# Patient Record
Sex: MALE | Race: WHITE | NOT HISPANIC OR LATINO | Employment: OTHER | URBAN - METROPOLITAN AREA
[De-identification: names, ages, dates, MRNs, and addresses within clinical notes are randomized per-mention and may not be internally consistent; named-entity substitution may affect disease eponyms.]

---

## 2017-01-30 ENCOUNTER — AMBULATORY - HEALTHEAST (OUTPATIENT)
Dept: CARDIOLOGY | Facility: CLINIC | Age: 67
End: 2017-01-30

## 2017-02-14 ENCOUNTER — OFFICE VISIT - HEALTHEAST (OUTPATIENT)
Dept: CARDIOLOGY | Facility: CLINIC | Age: 67
End: 2017-02-14

## 2017-02-14 DIAGNOSIS — E78.2 MIXED HYPERLIPIDEMIA: ICD-10-CM

## 2017-02-14 DIAGNOSIS — I25.10 CORONARY ARTERY DISEASE INVOLVING NATIVE CORONARY ARTERY OF NATIVE HEART WITHOUT ANGINA PECTORIS: ICD-10-CM

## 2017-02-14 DIAGNOSIS — I10 ESSENTIAL HYPERTENSION WITH GOAL BLOOD PRESSURE LESS THAN 140/90: ICD-10-CM

## 2017-02-14 DIAGNOSIS — I20.9 ISCHEMIC CHEST PAIN (H): ICD-10-CM

## 2017-02-14 DIAGNOSIS — F17.200 TOBACCO DEPENDENCE: ICD-10-CM

## 2017-02-14 ASSESSMENT — MIFFLIN-ST. JEOR: SCORE: 1563.64

## 2017-06-02 ENCOUNTER — RECORDS - HEALTHEAST (OUTPATIENT)
Dept: LAB | Facility: CLINIC | Age: 67
End: 2017-06-02

## 2017-06-02 LAB
CHOLEST SERPL-MCNC: 137 MG/DL
FASTING STATUS PATIENT QL REPORTED: NO
HDLC SERPL-MCNC: 45 MG/DL
LDLC SERPL CALC-MCNC: 81 MG/DL
TRIGL SERPL-MCNC: 56 MG/DL

## 2017-12-18 ENCOUNTER — COMMUNICATION - HEALTHEAST (OUTPATIENT)
Dept: ADMINISTRATIVE | Facility: CLINIC | Age: 67
End: 2017-12-18

## 2018-01-02 ENCOUNTER — AMBULATORY - HEALTHEAST (OUTPATIENT)
Dept: CARDIOLOGY | Facility: CLINIC | Age: 68
End: 2018-01-02

## 2018-01-02 ENCOUNTER — RECORDS - HEALTHEAST (OUTPATIENT)
Dept: ADMINISTRATIVE | Facility: OTHER | Age: 68
End: 2018-01-02

## 2018-01-08 ENCOUNTER — OFFICE VISIT - HEALTHEAST (OUTPATIENT)
Dept: CARDIOLOGY | Facility: CLINIC | Age: 68
End: 2018-01-08

## 2018-01-08 DIAGNOSIS — E78.2 MIXED HYPERLIPIDEMIA: ICD-10-CM

## 2018-01-08 DIAGNOSIS — I10 ESSENTIAL HYPERTENSION WITH GOAL BLOOD PRESSURE LESS THAN 140/90: ICD-10-CM

## 2018-01-08 DIAGNOSIS — I25.10 CORONARY ARTERY DISEASE INVOLVING NATIVE CORONARY ARTERY OF NATIVE HEART WITHOUT ANGINA PECTORIS: ICD-10-CM

## 2018-01-08 ASSESSMENT — MIFFLIN-ST. JEOR: SCORE: 1486.08

## 2018-07-02 ENCOUNTER — AMBULATORY - HEALTHEAST (OUTPATIENT)
Dept: NEUROLOGY | Facility: CLINIC | Age: 68
End: 2018-07-02

## 2018-07-02 DIAGNOSIS — R41.3 MEMORY CHANGES: ICD-10-CM

## 2018-07-24 ENCOUNTER — HOSPITAL ENCOUNTER (OUTPATIENT)
Dept: NEUROLOGY | Facility: CLINIC | Age: 68
Setting detail: THERAPIES SERIES
Discharge: STILL ADMITTED OR ADMINISTRATIVE | End: 2018-07-24
Attending: FAMILY MEDICINE

## 2018-07-24 DIAGNOSIS — R41.3 MEMORY CHANGES: ICD-10-CM

## 2018-07-24 DIAGNOSIS — G31.84 MILD NEUROCOGNITIVE DISORDER: ICD-10-CM

## 2018-09-10 ENCOUNTER — RECORDS - HEALTHEAST (OUTPATIENT)
Dept: LAB | Facility: CLINIC | Age: 68
End: 2018-09-10

## 2018-09-10 LAB
ANION GAP SERPL CALCULATED.3IONS-SCNC: 10 MMOL/L (ref 5–18)
BUN SERPL-MCNC: 14 MG/DL (ref 8–28)
CALCIUM SERPL-MCNC: 9.6 MG/DL (ref 8.5–10.5)
CHLORIDE BLD-SCNC: 104 MMOL/L (ref 98–107)
CHOLEST SERPL-MCNC: 138 MG/DL
CO2 SERPL-SCNC: 26 MMOL/L (ref 22–31)
CREAT SERPL-MCNC: 0.84 MG/DL (ref 0.7–1.3)
FASTING STATUS PATIENT QL REPORTED: YES
FOLATE SERPL-MCNC: 10.3 NG/ML
GFR SERPL CREATININE-BSD FRML MDRD: >60 ML/MIN/1.73M2
GLUCOSE BLD-MCNC: 100 MG/DL (ref 70–125)
HDLC SERPL-MCNC: 60 MG/DL
LDLC SERPL CALC-MCNC: 69 MG/DL
POTASSIUM BLD-SCNC: 3.8 MMOL/L (ref 3.5–5)
PSA SERPL-MCNC: 2.5 NG/ML (ref 0–6.5)
SODIUM SERPL-SCNC: 140 MMOL/L (ref 136–145)
TRIGL SERPL-MCNC: 45 MG/DL
TSH SERPL DL<=0.005 MIU/L-ACNC: 2.29 UIU/ML (ref 0.3–5)

## 2018-10-01 ENCOUNTER — HOSPITAL ENCOUNTER (OUTPATIENT)
Dept: NEUROLOGY | Facility: CLINIC | Age: 68
Setting detail: THERAPIES SERIES
Discharge: STILL ADMITTED OR ADMINISTRATIVE | End: 2018-10-01
Attending: FAMILY MEDICINE

## 2018-10-01 DIAGNOSIS — R41.3 MEMORY CHANGES: ICD-10-CM

## 2018-11-01 ENCOUNTER — HOSPITAL ENCOUNTER (OUTPATIENT)
Dept: NEUROLOGY | Facility: CLINIC | Age: 68
Setting detail: THERAPIES SERIES
Discharge: STILL ADMITTED OR ADMINISTRATIVE | End: 2018-11-01
Attending: FAMILY MEDICINE

## 2019-02-26 ENCOUNTER — RECORDS - HEALTHEAST (OUTPATIENT)
Dept: ADMINISTRATIVE | Facility: OTHER | Age: 69
End: 2019-02-26

## 2019-02-26 ENCOUNTER — AMBULATORY - HEALTHEAST (OUTPATIENT)
Dept: CARDIOLOGY | Facility: CLINIC | Age: 69
End: 2019-02-26

## 2019-02-27 ENCOUNTER — OFFICE VISIT - HEALTHEAST (OUTPATIENT)
Dept: CARDIOLOGY | Facility: CLINIC | Age: 69
End: 2019-02-27

## 2019-02-27 DIAGNOSIS — I25.10 CORONARY ARTERY DISEASE INVOLVING NATIVE CORONARY ARTERY OF NATIVE HEART WITHOUT ANGINA PECTORIS: ICD-10-CM

## 2019-02-27 DIAGNOSIS — F17.200 TOBACCO DEPENDENCE: ICD-10-CM

## 2019-02-27 DIAGNOSIS — I10 ESSENTIAL HYPERTENSION: ICD-10-CM

## 2019-02-27 DIAGNOSIS — E78.2 MIXED HYPERLIPIDEMIA: ICD-10-CM

## 2019-02-27 DIAGNOSIS — Z72.0 TOBACCO ABUSE: ICD-10-CM

## 2019-02-27 DIAGNOSIS — R07.89 CHEST WALL PAIN: ICD-10-CM

## 2019-02-27 ASSESSMENT — MIFFLIN-ST. JEOR: SCORE: 1545.05

## 2019-03-06 ENCOUNTER — COMMUNICATION - HEALTHEAST (OUTPATIENT)
Dept: CARDIOLOGY | Facility: CLINIC | Age: 69
End: 2019-03-06

## 2019-03-06 DIAGNOSIS — E78.2 MIXED HYPERLIPIDEMIA: ICD-10-CM

## 2019-03-06 DIAGNOSIS — I25.10 CORONARY ARTERY DISEASE INVOLVING NATIVE CORONARY ARTERY OF NATIVE HEART WITHOUT ANGINA PECTORIS: ICD-10-CM

## 2019-03-06 DIAGNOSIS — I10 ESSENTIAL HYPERTENSION WITH GOAL BLOOD PRESSURE LESS THAN 140/90: ICD-10-CM

## 2020-03-03 ENCOUNTER — RECORDS - HEALTHEAST (OUTPATIENT)
Dept: LAB | Facility: CLINIC | Age: 70
End: 2020-03-03

## 2020-03-03 LAB
ANION GAP SERPL CALCULATED.3IONS-SCNC: 12 MMOL/L (ref 5–18)
BUN SERPL-MCNC: 17 MG/DL (ref 8–28)
CALCIUM SERPL-MCNC: 10 MG/DL (ref 8.5–10.5)
CHLORIDE BLD-SCNC: 98 MMOL/L (ref 98–107)
CHOLEST SERPL-MCNC: 148 MG/DL
CO2 SERPL-SCNC: 25 MMOL/L (ref 22–31)
CREAT SERPL-MCNC: 0.84 MG/DL (ref 0.7–1.3)
FASTING STATUS PATIENT QL REPORTED: NORMAL
GFR SERPL CREATININE-BSD FRML MDRD: >60 ML/MIN/1.73M2
GLUCOSE BLD-MCNC: 97 MG/DL (ref 70–125)
HDLC SERPL-MCNC: 54 MG/DL
LDLC SERPL CALC-MCNC: 81 MG/DL
POTASSIUM BLD-SCNC: 3.6 MMOL/L (ref 3.5–5)
SODIUM SERPL-SCNC: 135 MMOL/L (ref 136–145)
TRIGL SERPL-MCNC: 64 MG/DL

## 2020-04-22 ENCOUNTER — COMMUNICATION - HEALTHEAST (OUTPATIENT)
Dept: CARDIOLOGY | Facility: CLINIC | Age: 70
End: 2020-04-22

## 2020-04-22 DIAGNOSIS — I25.10 CORONARY ARTERY DISEASE INVOLVING NATIVE CORONARY ARTERY OF NATIVE HEART WITHOUT ANGINA PECTORIS: ICD-10-CM

## 2020-04-22 DIAGNOSIS — I10 ESSENTIAL HYPERTENSION WITH GOAL BLOOD PRESSURE LESS THAN 140/90: ICD-10-CM

## 2020-04-27 ENCOUNTER — RECORDS - HEALTHEAST (OUTPATIENT)
Dept: ADMINISTRATIVE | Facility: OTHER | Age: 70
End: 2020-04-27

## 2020-04-27 ENCOUNTER — AMBULATORY - HEALTHEAST (OUTPATIENT)
Dept: CARDIOLOGY | Facility: CLINIC | Age: 70
End: 2020-04-27

## 2020-04-28 ENCOUNTER — OFFICE VISIT - HEALTHEAST (OUTPATIENT)
Dept: CARDIOLOGY | Facility: CLINIC | Age: 70
End: 2020-04-28

## 2020-04-28 DIAGNOSIS — I25.10 CORONARY ARTERY DISEASE INVOLVING NATIVE CORONARY ARTERY OF NATIVE HEART WITHOUT ANGINA PECTORIS: ICD-10-CM

## 2020-04-28 DIAGNOSIS — J30.2 SEASONAL ALLERGIC RHINITIS, UNSPECIFIED TRIGGER: ICD-10-CM

## 2020-04-28 DIAGNOSIS — E78.2 MIXED HYPERLIPIDEMIA: ICD-10-CM

## 2020-04-28 DIAGNOSIS — I10 ESSENTIAL HYPERTENSION: ICD-10-CM

## 2020-04-28 DIAGNOSIS — F17.200 TOBACCO DEPENDENCE: ICD-10-CM

## 2020-04-28 DIAGNOSIS — I10 ESSENTIAL HYPERTENSION WITH GOAL BLOOD PRESSURE LESS THAN 140/90: ICD-10-CM

## 2020-11-23 ENCOUNTER — RECORDS - HEALTHEAST (OUTPATIENT)
Dept: LAB | Facility: CLINIC | Age: 70
End: 2020-11-23

## 2020-11-23 LAB
ALBUMIN SERPL-MCNC: 3.9 G/DL (ref 3.5–5)
ALP SERPL-CCNC: 92 U/L (ref 45–120)
ALT SERPL W P-5'-P-CCNC: 18 U/L (ref 0–45)
ANION GAP SERPL CALCULATED.3IONS-SCNC: 15 MMOL/L (ref 5–18)
AST SERPL W P-5'-P-CCNC: 19 U/L (ref 0–40)
BILIRUB SERPL-MCNC: 0.5 MG/DL (ref 0–1)
BUN SERPL-MCNC: 20 MG/DL (ref 8–28)
CALCIUM SERPL-MCNC: 9.4 MG/DL (ref 8.5–10.5)
CHLORIDE BLD-SCNC: 105 MMOL/L (ref 98–107)
CO2 SERPL-SCNC: 24 MMOL/L (ref 22–31)
CREAT SERPL-MCNC: 1.04 MG/DL (ref 0.7–1.3)
GFR SERPL CREATININE-BSD FRML MDRD: >60 ML/MIN/1.73M2
GLUCOSE BLD-MCNC: 89 MG/DL (ref 70–125)
POTASSIUM BLD-SCNC: 3.7 MMOL/L (ref 3.5–5)
PROT SERPL-MCNC: 6.9 G/DL (ref 6–8)
PSA SERPL-MCNC: 3.8 NG/ML (ref 0–6.5)
SODIUM SERPL-SCNC: 144 MMOL/L (ref 136–145)

## 2021-05-20 ENCOUNTER — COMMUNICATION - HEALTHEAST (OUTPATIENT)
Dept: CARDIOLOGY | Facility: CLINIC | Age: 71
End: 2021-05-20

## 2021-05-20 DIAGNOSIS — I25.10 CORONARY ARTERY DISEASE INVOLVING NATIVE CORONARY ARTERY OF NATIVE HEART WITHOUT ANGINA PECTORIS: ICD-10-CM

## 2021-05-20 DIAGNOSIS — E78.2 MIXED HYPERLIPIDEMIA: ICD-10-CM

## 2021-05-20 DIAGNOSIS — I10 ESSENTIAL HYPERTENSION WITH GOAL BLOOD PRESSURE LESS THAN 140/90: ICD-10-CM

## 2021-05-26 ENCOUNTER — RECORDS - HEALTHEAST (OUTPATIENT)
Dept: ADMINISTRATIVE | Facility: CLINIC | Age: 71
End: 2021-05-26

## 2021-05-27 ENCOUNTER — RECORDS - HEALTHEAST (OUTPATIENT)
Dept: ADMINISTRATIVE | Facility: CLINIC | Age: 71
End: 2021-05-27

## 2021-05-28 ENCOUNTER — RECORDS - HEALTHEAST (OUTPATIENT)
Dept: ADMINISTRATIVE | Facility: CLINIC | Age: 71
End: 2021-05-28

## 2021-05-28 ENCOUNTER — COMMUNICATION - HEALTHEAST (OUTPATIENT)
Dept: ADMINISTRATIVE | Facility: CLINIC | Age: 71
End: 2021-05-28

## 2021-05-30 VITALS — HEIGHT: 68 IN | BODY MASS INDEX: 28.2 KG/M2 | WEIGHT: 186.1 LBS

## 2021-05-31 VITALS — BODY MASS INDEX: 25.61 KG/M2 | WEIGHT: 169 LBS | HEIGHT: 68 IN

## 2021-06-02 ENCOUNTER — RECORDS - HEALTHEAST (OUTPATIENT)
Dept: LAB | Facility: CLINIC | Age: 71
End: 2021-06-02

## 2021-06-02 VITALS — BODY MASS INDEX: 27.58 KG/M2 | HEIGHT: 68 IN | WEIGHT: 182 LBS

## 2021-06-02 LAB
ALBUMIN SERPL-MCNC: 4 G/DL (ref 3.5–5)
ALP SERPL-CCNC: 105 U/L (ref 45–120)
ALT SERPL W P-5'-P-CCNC: 21 U/L (ref 0–45)
ANION GAP SERPL CALCULATED.3IONS-SCNC: 14 MMOL/L (ref 5–18)
AST SERPL W P-5'-P-CCNC: 18 U/L (ref 0–40)
BILIRUB SERPL-MCNC: 0.6 MG/DL (ref 0–1)
BUN SERPL-MCNC: 19 MG/DL (ref 8–28)
CALCIUM SERPL-MCNC: 9.4 MG/DL (ref 8.5–10.5)
CHLORIDE BLD-SCNC: 99 MMOL/L (ref 98–107)
CHOLEST SERPL-MCNC: 142 MG/DL
CO2 SERPL-SCNC: 25 MMOL/L (ref 22–31)
CREAT SERPL-MCNC: 1 MG/DL (ref 0.7–1.3)
FASTING STATUS PATIENT QL REPORTED: NO
GFR SERPL CREATININE-BSD FRML MDRD: >60 ML/MIN/1.73M2
GLUCOSE BLD-MCNC: 102 MG/DL (ref 70–125)
HDLC SERPL-MCNC: 51 MG/DL
LDLC SERPL CALC-MCNC: 79 MG/DL
POTASSIUM BLD-SCNC: 3.9 MMOL/L (ref 3.5–5)
PROT SERPL-MCNC: 7 G/DL (ref 6–8)
SODIUM SERPL-SCNC: 138 MMOL/L (ref 136–145)
TRIGL SERPL-MCNC: 62 MG/DL

## 2021-06-04 VITALS
DIASTOLIC BLOOD PRESSURE: 75 MMHG | BODY MASS INDEX: 27.37 KG/M2 | SYSTOLIC BLOOD PRESSURE: 136 MMHG | OXYGEN SATURATION: 97 % | WEIGHT: 180 LBS | HEART RATE: 73 BPM

## 2021-06-07 ENCOUNTER — RECORDS - HEALTHEAST (OUTPATIENT)
Dept: ADMINISTRATIVE | Facility: OTHER | Age: 71
End: 2021-06-07

## 2021-06-08 ENCOUNTER — AMBULATORY - HEALTHEAST (OUTPATIENT)
Dept: ULTRASOUND IMAGING | Facility: HOSPITAL | Age: 71
End: 2021-06-08

## 2021-06-08 DIAGNOSIS — Z11.59 ENCOUNTER FOR SCREENING FOR OTHER VIRAL DISEASES: ICD-10-CM

## 2021-06-17 NOTE — PATIENT INSTRUCTIONS - HE
Patient Instructions by Pablito Solis DO at 2/27/2019  7:50 AM     Author: Pablito Solis DO Service: -- Author Type: Physician    Filed: 2/27/2019  8:10 AM Encounter Date: 2/27/2019 Status: Addendum    : Pablito Solis DO (Physician)    Related Notes: Original Note by Pablito oSlis DO (Physician) filed at 2/27/2019  8:10 AM       Below is a list of instructions we discussed today in clinic:   1. Continue to monitor symptom of chest wall pain.  If symptoms continue or do not completely resolve over 1-2 weeks will proceed with stress testing (call clinic if ongoing symptoms).    2. Continue current medications (should not be on clopidogrel).      It was a pleasure to meet with you today in clinic.  Please do not hesitate to call the [facility] clinic with any questions or concerns at [phone].    Sincerely,

## 2021-06-19 NOTE — PROGRESS NOTES
NEUROPSYCHOLOGICAL CONSULTATION    NAME:  Martell Patel  :  1950    KIMBALL: 2018    REASON FOR REFERRAL:  Mr. Patel is a 68 y.o., right-handed,  male with coronary artery disease, hypertension, hyperlipidemia, obstructive sleep apnea, chronic inflammatory demyelinating polyneuropathy, hearing loss, degenerative disc disease, and history of pulmonary embolism. At a recent appointment with his primary care provider, Isela Jay MD, the patient and his wife expressed concern about the patient's memory, particularly in light of his extensive family history of Alzheimer's disease in his immediate family. Subsequently, he was referred for this neuropsychological evaluation to establish a cognitive baseline and to assist with differential diagnosis and care planning.     Verbal consent for neuropsychological testing was received following the provision of information about the nature and purpose of the evaluation, and the opportunity to ask questions. Verbal permission to route a copy of the final report to his primary care provider was also obtained.     HISTORY OF PRESENTING PROBLEM:  The following information was obtained via medical record review and by interview of the patient.    Mr. Patel reported experiencing short-term memory loss that began insidiously in recent years and has remained largely stable over time. Specifically, he notices that when his wife asks him to do something, he gets distracted and then forgets what he was supposed to do. He also stated that he misplaces things in his garage more frequently, although his garage is unkempt. He denied getting lost in familiar places or forgetting recent events. He also denied any issues in other cognitive domains, such as attention/concentration, processing speed, or language processing.    With regard to the activities of daily living, Mr. Patel reported that he is fully independent with driving, medication and financial management,  "meal preparation, performing household chores, and running errands. He currently lives in his own home with his wife. He continues to build furniture out of wood for a hobby and generally makes the items without a plan. He denied any problems or changes in his woodworking ability.    MEDICAL HISTORY:  Mr. Patel's medical history is significant for:  Past Medical History:   Diagnosis Date     Ataxia      Chest pain      CIDP (chronic inflammatory demyelinating polyneuropathy) (H)     Remnant is that his sense of balance is impaired, particularly at night.     Coronary artery disease      HLD (hyperlipidemia)      HTN (hypertension)      Hyperlipidemia      Hypertension      Sleep apnea      Of note, the patient was diagnosed with a demyelinating neuropathy (CIDP) in 1999 after he developed paresthesias in his extremities over the course of a few weeks, which affected his balance. He was treated with steroids for about four months and his symptoms improved significantly but he did not return to baseline. Over the subsequent years, he has had numerous brief relapses of his sensory systems to a milder degree than the initial episode. Interestingly, neurological workup in 2008 was negative for CIDP or other neuropathy, but did suggest a \"posterior column disease of uncertain etiology.\" Multiple sclerosis (MS) was considered as a possibility, but his neurologist at the time believed his symptoms were somewhat atypical for MS and he was never treated for it. Neurology note dated 10/29/2008 stated that serial MRI studies were unchanged over the course of two years, making it \"very unlikely\" that the patient has MS.    The patient also reported hearing loss and occasional back and knee pain, and records note history of hepatitis A infection while serving in [place]. In addition, his balance is sometimes affected by his polyneuropathy. History of significant head injuries, stroke, seizure, heart attack, tremor, or falls were " "denied.     Of note, the patient has an appointment scheduled with Tee Amaya MD, in the [facility] clinic on 10/1/2018.    Diagnostic studies:  Brain MRI dated 10/16/2008 demonstrated \">10 foci of T2-hyperintensity within the cerebral white matter consistent with the clinical suspicion of demyelinating disease.\" These lesions were specifically noted in the periventricular, supraventricular, and paracallosal subcortical white matter. The lesions were noted to be slightly more prominent in the left periventricular region and in the white matter posterior to the right occipital horn. Some of the lesions were noted to have enlarged since prior study on 10/8/2007.     Past Surgical History:   Procedure Laterality Date     APPENDECTOMY       CARDIAC CATHETERIZATION  12/06/2016     CARDIAC CATHETERIZATION N/A 12/6/2016    Procedure: Coronary Angiogram;  Surgeon: Duane Gandhi MD;  Location: [facility];  Service:      CORONARY STENT PLACEMENT  12/06/2016    [place] to RCA.     REVISION AMPUTATION OF FINGER      Top of right pointer finger - cut on      THROAT SURGERY      \"To remove some polyps in my throat.  No, they weren't cancer.\"     TONSILLECTOMY       Current medications include (per medical record):   Current Outpatient Prescriptions:      amLODIPine (NORVASC) 10 MG tablet, Take 1 tablet (10 mg total) by mouth daily., Disp: 90 tablet, Rfl: 3     aspirin 81 MG EC tablet, Take 81 mg by mouth daily., Disp: , Rfl:      atorvastatin (LIPITOR) 80 MG tablet, Take 1 tablet (80 mg total) by mouth at bedtime., Disp: 90 tablet, Rfl: 3     clopidogrel (PLAVIX) 75 mg tablet, Take 1 tablet (75 mg total) by mouth daily., Disp: 90 tablet, Rfl: 3     fluticasone (FLONASE) 50 mcg/actuation nasal spray, Apply 1-2 sprays into each nostril as needed. , Disp: , Rfl:      hydroCHLOROthiazide (HYDRODIURIL) 50 MG tablet, Take 1 tablet (50 mg total) by mouth daily., Disp: 90 tablet, Rfl: 3     lisinopril " "(PRINIVIL,ZESTRIL) 40 MG tablet, Take 1 tablet (40 mg total) by mouth at bedtime., Disp: 90 tablet, Rfl: 3     nitroglycerin (NITROSTAT) 0.4 MG SL tablet, Place 1 tablet (0.4 mg total) under the tongue once as needed for chest pain., Disp: 90 tablet, Rfl: 12     potassium chloride SA (K-DUR,KLOR-CON) 20 MEQ tablet, Take 2 tablets (40 mEq total) by mouth every morning., Disp: 180 tablet, Rfl: 3.    FAMILY MEDICAL HISTORY:   Family neurologic history is significant for dementia in an uncle and in four of the patient's older siblings, two of whom are currently in a nursing home. One of the siblings with dementia has been an alcoholic since his 20s, and another was described as having possible longstanding mental health issues. He has a different sibling with multiple sclerosis. Alcoholism is prevalent in the family. Three of his siblings are left handed. Of note, the patient comes from a family of 13. Other family history is positive for the following:  Family History   Problem Relation Age of Onset     Heart attack Brother      Coronary artery disease Brother      PSYCHIATRIC HISTORY:  Currently, the patient described his mood as \"somewhat depressed\" over the past 18 months, which he attributed to the current political climate. He is also stressed about having to support him and his wife with Social Security. He acknowledged a tendency to be irritable and have \"anger issues\" at times, but denied ever resorting to violence. The patient is a [place]  who was exposed to combat, and he endorsed ongoing (and likely subclinical) symptoms of post-traumatic stress disorder. He has never received any mental health treatments, as he reported that he relies on his \"tenacity\" to cope. He reported that his appetite is normal. His sleep is notably disrupted; he typically falls asleep fine but wakes up after about 4 hours and is typically awake for 2 hours with a racing mind. He eventually returns to sleep. He estimates that " "he is typically getting 6-7.5 hours of sleep per night and reported that he feels adequately energized during the day. He uses his CPAP nightly. Symptoms of REM sleep behavior disorder were denied.    With regard to substance abuse, Mr. Patel reported that he started drinking alcohol heavily during his service in [place], and continued to drink heavily for six years after the war. He quit \"cold turkey\" after he drove intoxicated with his 7 year-old child in the car and his wife essentially gave him an ultimatum.  He has been a 1 ppd smoker since [place], and drinks 8-10 cups of coffee and 1-2 cans of coke daily.     SOCIAL HISTORY:  Mr. Patel was born and raised in [place], [place]. He graduated high school and earned a Bachelor's degree in History from [employer] in [place]. He later attended trade school. He earned mostly average grades. Significant learning difficulties or developmental attention issues were denied. As mentioned previously, he served in the [employer] during the [place] War ([id number]). He is [facility] loss through the [facility] but does not receive any medical care from a [facility] hospital. 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 He has been  for 46 years, and they have three children together and one grandchild. The patient and his wife live together in their own home in [place], [place]. On a typical day, the patient helps his wife with her in-home  business and makes furniture out of wood in his garage.     SERVICES:   One hour of the neuropsychologist's time was spent performing a neurobehavioral status examination (86145); 3 hours of the neuropsychologist's time was spent in medical record review, administering the WMS-III Information and Orientation subtest, interpretating and integrating all tests, and in report preparation (99955); and 3 hours was spent in the " administration of the remainder of the testing battery by a trained examiner and interpreted by the neuropsychologist (81480). For diagnostic and coding purposes, Mr. Patel has a history of coronary artery disease, hypertension, hyperlipidemia, obstructive sleep apnea, chronic inflammatory demyelinating polyneuropathy, hearing loss, degenerative disc disease, and history of pulmonary embolism. He was referred for an evaluation of mild neurocognitive disorder.    TESTS ADMINISTERED:   Wechsler Memory Scale III - Information/Orientation, Wechsler Adult Intelligence Scale-IV (select subtests), Wide Range Achievement Test-4 (select subtests), Wechsler Memory Test-IV (select subtests), Brief Visuospatial Memory Test-Revised, [place] Verbal Learning Test-Short Form, Trailmaking Test, Controlled Oral Word Association Test and Category Fluency, Shipman Naming Test-Short Form,Clock Drawing Test, Jaelyn Raya Executive Functioning Test (Color Word Interference subtest), Wisconsin Card Sorting Test-1 deck, Generalized Anxiety Disorder-7 Assessment and Geriatric Depression Scale-Short Form.    BEHAVIORAL OBSERVATIONS:   Mr. Patel arrived on time and unaccompanied to today's appointment. He was appropriately dressed and groomed. He appeared alert and engaged. Gait and other motor activity appeared normal. No vision or hearing difficulties were observed. Conversational speech was of normal rate, volume, and prosody. No word-finding pauses or paraphasias were noted. His thought process appeared generally linear and goal-directed, although occasionally he was mildly tangential. No hallucinations or delusions were apparent. Judgment and insight appeared intact. His mood was euthmyic and his affect was appropriately reactive. Rapport was easily established and eye contact was appropriate. During testing, he was alert throughout. He was pleasant and cooperative throughout the evaluation. There was subtle indication of  perseverative responses and impulsivity on testing. He understood test instructions without difficulty. Mr. Patel appeared adequately motivated and engaged easily during testing. Therefore, the following results are considered a valid estimation of his current cognitive abilities.    OPTIMAL PREMORBID INTELLECT:  Optimal premorbid intellectual abilities were estimated as falling in the average range based on Mr. Patel's educational and occupational histories and performance on tasks least likely to be affected by acquired brain dysfunction.    SUMMARY OF TEST RESULTS:  ATTENTION/WORKING MEMORY. Performance on a measure sensitive to sustained auditory-verbal attention and concentration (WAIS-IV Digit Span) was in the average range, as he was able to recite up to 7 digits forward, up to 4 digits backward, and up to 6 digits in sequence. On a test of complex concentration that requires speeded numeric sequencing (Trails A), the patient performed in the borderline impaired range but without error. On a more difficult version of that task that requires speeded alpha-numeric sequencing/cognitive set-shifting (Trails B), performance was in the low average range and two errors were recorded.     PROCESSING SPEED. On a measure of psychomotor speed, the patient performed in the average range (WAIS-IV Symbol Search). On the DKEFS Color-Word Interference Test, speeded color naming was average, speeded word reading was high average, and completion time on the inhibition subtest was average.      LANGUAGE PROCESSING. Language comprehension appeared intact. The WAIS-IV Verbal Comprehension Index was in the high average range (pro-rated VCI = 110), with average performance on a measure of verbal abstract reasoning (Similarities), and superior performance on a fund of information task (Information). The patient demonstrated average performance on the Wilder Naming Test, a test of visual confrontation naming and semantic retrieval.  "Phonemic fluency was in the high average range (COWAT), while semantic fluency was low average. His writing sample was grammatically incorrect (\"All people need to follow leader who respect the beliefs and rights of all people.\"). There was no evidence of micrographia.     VISUOSPATIAL/CONSTRUCTIONAL SKILLS. The WAIS-IV Perceptual Reasoning Index was in the average range (pro-rated WALI = 94), with average nonverbal abstract reasoning (Matrix Reasoning), and average visuoconstruction with three-dimensional blocks (Block Design). Copy of six simple geometric figures was within normal limits (BVMT-R Copy). On a Clock Drawing Task, the patient was able to draw a large, well-formed [place] with equally spaced numbers and clock hands that converged nicely in the center of the clock face. The clock hands were also well-differentiated by length.     LEARNING/MEMORY. The patient was adequately oriented to personal and current information, although he was 4 days off in estimating the current date. On the WMS-IV Logical Memory subtest, immediate memory for paragraph-length stories was average while delayed memory was low average with a 28% retention rate. Delayed recognition of that same material was low average. On a 9-item verbal list-learning task (California Verbal Learning Test-II Short Form), the patient acquired up to 5 words (56%) of the word list by the 4th and final learning trial (raw scores over trials = 4, 4, 4, 5). Total learning acquisition was in the borderline impaired range. Following a distractor task, the patient recalled 6 items, which was in the average range. After a 10-minute delay, the patient recalled 1 items, which was in the borderline impaired range. His recall benefited slightly from semantic cues (4 items; average range). Recognition discriminability was in the high average range, as he recognized 8/9 items (average) and made 0 false positive errors (high average).     On a visual memory measure " (BVMT-R), immediate recall of six simple geometric figures over three learning trials was in the borderline impaired range (raw scores over trials = 3, 3, 4 out of 12). Delayed recall of the figures was in the impaired range (raw score = 3 out of 12). However, recognition discriminability was in the average range, as he correctly recognized 6/6 figures (average) and made 0 false positive errors (average).     EXECUTIVE FUNCTIONS. Concept identification and the ability to generate alternative problem solving strategies was low average for age on the Wisconsin Card Sorting Test. He completed 1 out of 3 categories (low average) with a total of 32 errors, which is borderline impaired. Thirteen of his errors were perseverative (average) and there were no failures to maintain set. On a test of inhibition and cognitive flexibility, (DKEFS Color-Word Interference Inhibition trial), the patient made 0 errors, which is in the high average range. No errors were made on a test of speeded sequencing or speeded cognitive set-shifting (Trails A and B). Verbal and nonverbal abstract reasoning were both average (WAIS-IV Similarities and Matrix Reasoning). Phonemic fluency was high average, as measured by COWAT. Performance on the Clock Drawing Test was intact, as he was able to accurately represent analog time.      MOOD. On a self-report measure of depression (Geriatric Depression Scale - Short Form), the patient endorsed 1 item. This suggests depressive symptoms in the normal range. On the Generalized Anxiety Disorder-7, a self-report measure of anxiety, he obtained a score of 3,  placing him in the range of minimal anxiety.    DIAGNOSTIC IMPRESSIONS:  Mr. Patel is a 68 y.o., right-handed,  male with history of coronary artery disease, hypertension, hyperlipidemia, obstructive sleep apnea, chronic inflammatory demyelinating polyneuropathy, hearing loss, degenerative disc disease, and history of pulmonary embolism. At a  recent appointment with his primary care provider, Isela Jay MD, the patient and his wife expressed concern about the patient's memory, particularly in light of his extensive family history of Alzheimer's disease in his immediate family. Subsequently, he was referred for this neuropsychological evaluation to establish a cognitive baseline and to assist with differential diagnosis and care planning.    Optimal premorbid intellectual abilities are estimated as falling in the average range, and Mr. Patel's performances are generally commensurate with that estimate. There is evidence of very subtle retrieval difficulties on memory testing. With regard to memory performances more specifically, the patient has some difficulty freely recalling both verbal and nonverbal information after a time delay ([id number] impaired performances); however, his recall is significantly aided when he is provided with recognition prompts/cues. Novel problem solving/concept identification is also a subtle area of difficulty for this patient (borderline impaired performance). All other cognitive domains are largely intact, including attention/concentration, processing speed, visuospatial/constructional skills, language processing, and all other executive functions (i.e., cognitive inhibition, abstract reasoning, verbal fluency, and mental flexibility). The patient denied significant depression or anxiety symptoms on self-report questionnaires.    Overall, these results are suggestive of extremely subtle prefrontal dysfunction. His history and the current profile are most consistent with a diagnosis of Mild Neurocognitive Disorder. Etiology is somewhat unclear at this time and may include a combination of factors. Specifically, the patient's brain MRI from 2008 demonstrated numerous lesions related to a demyelinating disease. Assuming that these lesions are still present in some capacity, it is possible that they could  contribute to his current subtle deficits. The patient also has numerous cerebrovascular risk factors that may be contributory to some degree, and his sleep quality is reportedly poor. An updated brain MRI may help to clarify etiology. The current cognitive profile is not compelling for an Alzheimer's etiology, and I do not suspect any medication side effects.    RECOMMENDATIONS:  1) As noted above, repeat brain MRI should be considered, as it may be helpful in clarifying the etiology of the patient's subtle cognitive difficulties particularly given his history of chronic inflammatory demyelination and cerebrovascular risk factors.    2) Given his history of sleep disturbance, sleep hygiene strategies may be useful. Such changes might include avoiding watching television or reading in bed before attempting to fall asleep (instead, he should perform these activities outside of the bedroom); avoid eating, consuming caffeine or alcohol, or exercising several hours before trying to fall asleep; avoid napping during the day; and establishing a regular bedtime and wake-up time. Relaxation exercises (e.g., listening to soothing music, deep breathing, progressive muscle relaxation) right before going to bed might also help. If he tends to have difficulty returning to sleep in the night or in falling asleep due to worrying, establishing care with a psychotherapist may also be useful. There are therapists who specialize in insomnia treatment as well ([facility]; Teodoro Izaguirre PsyD or Cailin Dominguez PsyD).  If these techniques do not improve his sleep, he may wish to consult his physician to assess for any underlying physical issues that may be impacting his sleep and to determine if an updated sleep study is warranted. It is also prudent that his CPAP be checked regularly to ensure optimal performance.    3) Given his subtle retrieval difficulties, cognitive compensatory strategies should be of benefit, such as utilizing  note pads, checklists, to-do lists, alarm reminders, a pillbox, and maintaining a routine and an organized living environment.    4) It is important that Mr. Patel continue to follow his medication treatment regimen so as to maintain his physical health, as this can have a significant impact on his physical, emotional, and cognitive functioning.     5) Repeat neuropsychological testing is recommended in 18-24 months, or as clinically indicated, in order to monitor the patient's cognitive status and clarify etiology.    Mr. Patel has requested to receive the results of this evaluation from his referring provider at the time of an upcoming follow-up appointment; however, he was encouraged to schedule a formal feedback appointment with me after that appointment to discuss these results in further detail. Thank you for allowing me to participate in Mr. Patel's care. Please contact me with any questions regarding the content of this report.        Crystal Ortega PsyD, LP  Licensed Clinical Neuropsychologist  [facility]  Neuropsychology Section   Phone:  [phone]

## 2021-06-20 NOTE — PROGRESS NOTES
[facility] Outpatient Consultation    Assessment / Impression:   1.  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  Tobacco abuse with greater than 50-pack-year smoking history.  Patient currently smokes 1 pack/day  3.  COPD  4.  History of alcoholism, patient sober times 40 years  5.  Obstructive sleep apnea, treatment compliant  5.  Hypertension  6.  Hypercholesterolemia  7.  History of pulmonary embolism  8.  Status post TNA, knee, ankle and hand surgery  10.  History of CIDPM    Plan:   1.  Stop smoking  2.  Stop smoking  3.  Heart healthy diet  4.  Physical activity program.  Patient may benefit from cardiac evaluation prior to beginning more intensive cardiovascular training    Long conversation held with the patient who presents unaccompanied today.  I have asked the patient to return in 1 month with his spouse so that I may explain the above-noted diagnoses and recommended interventions to both the patient and spouse directly.  This patient with multiple cardiovascular risks has a self-reported history of static impairment in short-term memory most consistent with what might be noted when experiencing a retrieval deficit.  Indeed, neuropsychometric testing also appears to yield evidence of subtle prefrontal impairments including difficulties with retrieval consistent with prefrontal impairments.  While certainty with respect to the etiology cannot be achieved, the patient's constellation of vascular risk including ongoing tobacco abuse leave little doubt that smoking and cerebrovascular disease or at least playing a substantive role in these observed difficulties.  With this in mind treatments would follow along the lines of wellness focusing on smoking cessation.  Beyond this, a wellness program would also seem reasonable.    Total time for this evaluation was  1 hour with majority time spent in counseling.  All questions answered.  Follow-up in 1 month.    Subjective:   HPI: Martell Patel is a 68 y.o. male with above-noted past medical history who presents unaccompanied for cognitive evaluation.  The patient reports a greater than 1 year history of occasional difficulty with short-term recall most consistent with what might be noted when experiencing a retrieval deficit.  Fairly static in nature, the patient denies other difficulties from a cognitive perspective and denies that these difficulties are severe enough to warrant assistance in completing his daily routine.  The patient has undergone a diagnostic evaluation including MRI of the brain and neuropsychometric testing.  Neuropsychometric testing does reveal subtle deficits in prefrontal function resulting in problematic retrieval as well as to mild difficulty with problem-solving.    The patient today presents as a reasonably well-maintained elderly male without evidence of acute or significantly chronic physical distress.  A heavy odor of tobacco is noted.  On questioning he demonstrates a good level of attentiveness with what appears to be a normal rate of thought generation.  No significant physical difficulties are noted at this time as the patient denies significant shortness of breath, chest discomfort headaches difficulties with vision weight loss.  Specifically he denies visual hallucinations tremors loss of consciousness falls focal weakness.  No difficulty with sleeping reported.        Past Medical History:   As above    Social History:   Born in [place], the patient completed high school education before serving in the [employer] during the [place] conflict.  He did not see active combat however.   for 46 years he raised 3 children while working in a variety of positions.  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  He does  have a BA degree from "[employer] in [facility].  He currently resides in [place] with his wife.  Past Psychiatric History:   As noted above, the patient reports a history of alcohol abuse.  He has been abstinent for more than 40 years.  He does continue to use tobacco smoking currently 1 pack of cigarettes per day.    Family History:   Remarkable for dementia that medical records indicate may be related to Alzheimer's disease.  Martell reports that many first-degree relatives suffered from alcohol abuse and may have suffered cognitive impairment secondary to substance use.    Review of Systems:  Pertinent items are noted in HPI.    Objective:   As above    Vitals:    10/01/18 1416   BP: 148/71   Pulse: 96     Physical Exam:    Skin exam reveals chronic ischemic changes.  No significant lower extremity edema noted.  HEENT is grossly unremarkable.  Lungs with diminished breath sounds.  Chest with increased AP diameter consistent with COPD.  Cardiac exam with a regular S1-S2 without gallop.  Abdomen soft nontender.  Periphery reveals peripheral joint changes consistent with degenerative joint disease.  No acute redness swelling or tenderness noted.    Neurological Exam:     Cranial nerves reveal no evidence of focality.  Forward gaze is conjugate with normal pursuit and saccadic movements and normal volitional EOMs.  No evidence of nystagmus, ocular ataxia or apraxia.  No square wave jerks.  Peripherally strength appears to be intact with no tonal abnormalities noted axillae or in the extremities.  Reflexes are greatly diminished.  The patient demonstrated normal station and gait with no evidence of focal weakness ataxia gait apraxia or freezing of gait.    Cognitive Evaluation:     The patient had the appearance noted above.  He was alert attentive and socially appropriate throughout the course of evaluation.  Thoughts appeared to generate at a normal rate and were well-organized and goal-directed.  Speech is  fluent with no evidence of word finding hesitations, paraphasias or evidence of motor speech problems.  Attentional function was good with no fluctuation in level of consciousness or ebenezer distractibility.  Short-term recall appeared to be good at this time.  No evidence of spatial difficulties noted.  No clear evidence of apraxia.  Affect was noted to be pleasant and mood congruent.  No abnormal thought content or form.  No evidence of significant anxiety.  No depressive symptoms noted.  No unusual ideations or behaviors.  No stereotypies.    Medications:  Scheduled Meds:  Continuous Infusions:  PRN Meds:.    Tee Amaya MD  10/1/2018

## 2021-06-21 NOTE — PROGRESS NOTES
Persons accompanying you (the patient) today: Wife    How have you been doing since we saw you last? Please note any concerns.  Pt states he is okay. Trying to stay active. Him and his wife want to know what the next step is. He is having trouble sleeping at night.    Please list any recent hospitalizations/surgeries/procedures you've had since we saw you last:  None    Have you had any falls since your last visit? No    Do you have any pain today? No

## 2021-06-21 NOTE — PROGRESS NOTES
Assessment / Impression   1.  Mild cognitive impairment likely multifactorial in etiology  2.  Tobacco abuse  3.  COPD  4.  VALENTE  5.  Other medical problems as noted      Plan:   1.  Stop smoking  2.  Stop smoking  3.  Heart healthy diet  4.  Physical activity    A long conversation held with the patient and wife Maureen in attendance.  I outlined the patient's diagnostic evaluation to date, his diagnosis, likely etiologies and treatment interventions.  All questions were answered.    Total time for this evaluation one hour with majority time spent in counseling.  All questions answered.  Follow-up in 1 year or sooner.  On follow-up consider repeat neuropsychometric testing.      Subjective:     HPI: Martell Patel is a 68 y.o. male with above-noted diagnoses who is seen in follow-up.  The patient appears to be stable from that noted previously.  He is attended today by his wife Maureen.  As noted above, I reviewed with Maureen the patient's history of present illness and diagnostic testing to date including neuropsychometric testing and structural imaging of the brain.          Review of Systems:          As above        Objective:     Vitals:    11/01/18 1358 11/01/18 1359 11/01/18 1400   BP: 156/71 146/56 147/69   Pulse: 81 86 89       No results found for this or any previous visit (from the past 24 hour(s)).    Physical Exam: As noted previously

## 2021-06-25 NOTE — PROGRESS NOTES
Progress Notes by Pablito Solis DO at 2/14/2017 11:10 AM     Author: Pablito Solis DO Service: -- Author Type: Physician    Filed: 2/14/2017  4:05 PM Encounter Date: 2/14/2017 Status: Signed    : Pablito Solis DO (Physician)           Click to link to [facility] Heart Care     [facility] HEART CARE NOTE      Assessment/Recommendations   Assessment:    1. Coronary artery disease status post mid RCA drug eluting stent (Synergy 3.5 x 16m) with moderate diffuse CAD in LM, LAD, LCX.   No cardiac symptoms at this time.    2. Hyperlipidemia  3. Essential Hypertension   4. History of sleep apnea.      Plan:  1. Change to plavix 75 mg daily. Stop Effient given high cost.   2. Increase atorvastatin to 80mg daily.   3. Continue ASA 81 mg daily.  4. Continue amlodipine and lisinopril daily.     Follow in December 2017.         History of Present Illness    Mr. Martell Patel is a 67 y.o. male with coronary artery disease s/p mid RCA stent, hypertension, hyperlipemia, former smoker who presents in cardiology clinic after recent coronary intervention to the mid right coronary artery.     Patient originally presented in consultation with anginal chest pain.  Given this he underwent stress testing which demonstrated high risk features for coronary events.   Coronary angiogram demonstrated significant mid right coronary lesion which he underwent stent placement with a synergy 3.5 x 16 mm drug-eluting stent.  Since undergoing coronary intervention he has had no further dyspnea on exertion or anginal chest pain.  He remains compliant with his medications.  He does state Effient has a very high cost and may be prohibitive for him to take (changing to plavix).      Patient denies recent anginal chest pain, dyspnea with exertion, palpitations, syncope, edema, orthopnea, PND or cough.  Patient denies any active or recent bleeding issues or disorders.       Coronary Angiogram:   12/16/2016  Dominance:  Right/   /Left Main    ? LM lesion, 35% stenosed. Not the culprit lesion. The lesion is located proximal to the major branch and eccentric.      Left Anterior Descending    ? Prox LAD lesion, 35% stenosed.      Left Circumflex    ? Ost Cx lesion, 40% stenosed. Not the culprit lesion. The lesion is high risk and located at the ostium.      Right Coronary Artery    ? Mid RCA lesion, 95% stenosed. Culprit lesion. The lesion is not high risk.    ? PCI: The guidewire crossed lesion. The pre-interventional distal flow is normal (LEONIDES 3). A STENT SYNERGY  [id number] drug eluting stent was successfully placed. The strut is apposed. Post-stent angioplasty was performed using a CATH EMERGE NC MR US 3.50MM X [id number] supply. Maximum pressure: 22 jeane. The post-interventional distal flow is normal (LEONIDES 3). The intervention was successful. No complications occurred at this lesion.   ? There is no residual stenosis post intervention.       ? Mid RCA to Dist RCA lesion, 40% stenosed. Not the culprit lesion. The lesion is diffuse.            ECHO (personnaly reviewed): 11/23/2016  Summary  1. Normal left ventricular size and systolic function. Left ventricular  ejection fraction is visually estimated to be 60%. Normal left ventricular  wall thickness. E/e' is < 8, suggesting normal LV filling pressures .  2. Normal right ventricular size and systolic function.  3. Mildly enlarged left atrium.     Physical Examination Review of Systems   Vitals:    02/14/17 1134   BP: 126/70   Pulse:    SpO2:      Body mass index is 28.3 kg/(m^2).  Wt Readings from Last 3 Encounters:   02/14/17 186 lb 1.6 oz (84.4 kg)   12/23/16 186 lb (84.4 kg)   12/06/16 181 lb 6.4 oz (82.3 kg)       General Appearance:   no distress, normal body habitus   ENT/Mouth: membranes moist, no oral lesions or bleeding gums.      EYES:  no scleral icterus, normal conjunctivae   Neck: no carotid bruits or thyromegaly   Chest/Lungs:   lungs are clear to  "auscultation, no rales or wheezing, no sternal scar, equal chest wall expansion    Cardiovascular:   Regular. Normal first and second heart sounds with no murmurs, rubs, or gallops; the carotid, radial and posterior tibial pulses are intact, Jugular venous pressure normal, no edema bilaterally. No change.    Abdomen:  no organomegaly, masses, bruits, or tenderness; bowel sounds are present   Extremities: no cyanosis or clubbing   Skin: no xanthelasma, warm.    Neurologic: normal gait, normal  bilateral, no tremors     Psychiatric: alert and oriented x3, calm     General: WNL  Eyes: WNL  Ears/Nose/Throat: WNL  Lungs: WNL  Heart: WNL  Stomach: WNL  Bladder: WNL  Muscle/Joints: Joint Pain, Muscle Weakness  Skin: WNL  Nervous System: WNL  Mental Health: WNL     Blood: WNL     Medical History  Surgical History Family History Social History   Past Medical History:   Diagnosis Date   ? Ataxia    ? Chest pain    ? CIDP (chronic inflammatory demyelinating polyneuropathy)     Remnant is that his sense of balance is impaired, particularly at night.   ? Coronary artery disease    ? HLD (hyperlipidemia)    ? HTN (hypertension)    ? Hyperlipidemia    ? Hypertension    ? Sleep apnea     Past Surgical History:   Procedure Laterality Date   ? APPENDECTOMY     ? CARDIAC CATHETERIZATION  12/06/2016   ? CARDIAC CATHETERIZATION N/A 12/6/2016    Procedure: Coronary Angiogram;  Surgeon: Duane Gandhi MD;  Location: [facility] Cath Lab;  Service:    ? CORONARY STENT PLACEMENT  12/06/2016    [place] to RCA.   ? REVISION AMPUTATION OF FINGER      Top of right pointer finger - cut on    ? THROAT SURGERY      \"To remove some polyps in my throat.  No, they weren't cancer.\"   ? TONSILLECTOMY      Family History   Problem Relation Age of Onset   ? Heart attack Brother    ? Coronary artery disease Brother     Social History     Social History   ? Marital status:      Spouse name: N/A   ? Number of children: N/A   ? Years of " education: N/A     Occupational History   ? Not on file.     Social History Main Topics   ? Smoking status: Current Every Day Smoker     Packs/day: 1.50     Years: 50.00   ? Smokeless tobacco: Never Used   ? Alcohol use No      Comment: No alcohol since 1978   ? Drug use: No   ? Sexual activity: Not on file     Other Topics Concern   ? Not on file     Social History Narrative          Medications  Allergies   Current Outpatient Prescriptions   Medication Sig Dispense Refill   ? amLODIPine (NORVASC) 10 MG tablet Take 10 mg by mouth daily.     ? aspirin 81 MG EC tablet Take 81 mg by mouth daily.     ? atorvastatin (LIPITOR) 80 MG tablet Take 1 tablet (80 mg total) by mouth bedtime. 90 tablet 3   ? fluticasone (FLONASE) 50 mcg/actuation nasal spray Apply 1-2 sprays into each nostril as needed.      ? hydroCHLOROthiazide (HYDRODIURIL) 50 MG tablet Take 50 mg by mouth daily.     ? lisinopril (PRINIVIL,ZESTRIL) 40 MG tablet Take 40 mg by mouth bedtime.     ? nitroglycerin (NITROSTAT) 0.4 MG SL tablet Place 1 tablet (0.4 mg total) under the tongue once as needed for chest pain. 90 tablet 12   ? potassium chloride SA (K-DUR,KLOR-CON) 20 MEQ tablet Take 40 mEq by mouth every morning.     ? clopidogrel (PLAVIX) 75 mg tablet Take 1 tablet (75 mg total) by mouth daily. 31 tablet 11     No current facility-administered medications for this visit.       No Known Allergies      Lab Results    Chemistry/lipid CBC Cardiac Enzymes/BNP/TSH/INR   Lab Results   Component Value Date    CHOL 159 02/08/2016    HDL 58 02/08/2016    LDLCALC 89 02/08/2016    TRIG 61 02/08/2016    CREATININE 0.83 12/06/2016    BUN 14 12/06/2016    K 4.2 12/06/2016     12/06/2016     (H) 12/06/2016    CO2 27 12/06/2016    Lab Results   Component Value Date    WBC 6.7 12/06/2016    HGB 16.3 12/06/2016    HCT 46.0 12/06/2016    MCV 87 12/06/2016     12/06/2016    Lab Results   Component Value Date    CKMB 2 12/06/2016    TROPONINI <0.01  11/23/2016    BNP 10 11/23/2016    INR 0.98 11/23/2016

## 2021-06-27 NOTE — PROGRESS NOTES
Progress Notes by Pablito Solis DO at 2/27/2019  7:50 AM     Author: Pablito Solis DO Service: -- Author Type: Physician    Filed: 2/27/2019  8:27 AM Encounter Date: 2/27/2019 Status: Signed    : Pablito Solis DO (Physician)           Click to link to [facility]     [facility] HEART CARE NOTE      Assessment/Recommendations   Assessment:    1. Chest wall pain related to mild chest wall trauma.  Non-exertional.  Reproducible with palpitation over anterior chest wall.  Patient states he pulled heavily on a shovel that was on his anterior chest wall felt severe pain for a brief period of time.  The pain is nearly resolved.  Pain is worse with pressing over his left anterior chest wall.  EKG at his primary care provider's office yesterday was reviewed and did not demonstrate any sign of ischemia.  2. Coronary artery disease status post mid RCA drug eluting stent (Synergy 3.5 x 16m) with moderate diffuse CAD in LM, LAD, LCX.   No cardiac symptoms at this time.    3. Hyperlipidemia, LDL:80  4. Essential Hypertension   5. History of sleep apnea.    6. Tobacco Abuse     Plan:  1. Discontinue plavix  2. Continue atorvastatin to 80mg daily for HLD and CAD.    3. Continue ASA 81 mg daily, lifelong.  4. Continue amlodipine, HCTZ and lisinopril daily for HTN.   5. Consider discontinuation of smoking, again discussed with patient (>3 minutes).    6. Follow-up in 12 months.        History of Present Illness    Mr. Martell Patel is a 69 y.o. male with coronary artery disease s/p mid RCA stent, hypertension, hyperlipemia, active smoker who presents in cardiology rapid access clinic for chest wall pain.    Going to the patient he was out shoveling his snow when he leaned over his shoveled the pull something out of the ground after pulling heavily across the ground with his shovel against his chest wall he feels he injured his anterior chest wall.  Pain was quite intense for 1-2  minutes.  Pain was not the same pain that he experienced prior to stenting of his right coronary artery.  The pain has not worsened with activity or exertion.  The pain is gradually improving.  Pain is worse with pressing across his anterior chest wall across the intercostal space.      Denies any new onset of dyspnea on exertion lightheadedness, orthopnea PND symptoms or lower extremity edema.  He had no syncopal episodes.  He has had no anginal chest pain symptoms.  She continues to do outside shoveling frequently which she states is not caused him any chest pain or shortness of breath.  Last shoveled this morning with no symptoms.    He stopped his Plavix as was advised in December 2018.  He continues to be on aspirin daily.  He is compliant with his antihypertensives and anti-lipid medications.    At this time he continues to smoke cigarettes which I again have discussed with him the importance of considering discontinuation given coronary artery disease and his overall health.  This time he does not feel he is ready to quit smoking.  He had quit in the past after stenting for a brief period of time.    Denies any bleeding comp occasions related aspirin therapy.  I did review outpatient EKG performed at entire clinic which demonstrated normal sinus rhythm with no ST or T wave abnormalities concerning for active ischemia at rest.      Coronary Angiogram: 12/16/2016  Dominance: Right/   /Left Main    ? LM lesion, 35% stenosed. Not the culprit lesion. The lesion is located proximal to the major branch and eccentric.      Left Anterior Descending    ? Prox LAD lesion, 35% stenosed.      Left Circumflex    ? Ost Cx lesion, 40% stenosed. Not the culprit lesion. The lesion is high risk and located at the ostium.      Right Coronary Artery    ? Mid RCA lesion, 95% stenosed. Culprit lesion. The lesion is not high risk.    ? PCI: The guidewire crossed lesion. The pre-interventional distal flow is normal (LEONIDES 3). A STENT  VINAYAK MR [id number] drug eluting stent was successfully placed. The strut is apposed. Post-stent angioplasty was performed using a CATH EMERGE NC MR US 3.50MM X [id number] supply. Maximum pressure: 22 jeane. The post-interventional distal flow is normal (LEONIDES 3). The intervention was successful. No complications occurred at this lesion.   ? There is no residual stenosis post intervention.       ? Mid RCA to Dist RCA lesion, 40% stenosed. Not the culprit lesion. The lesion is diffuse.          ECHO (personnaly reviewed): 11/23/2016  Summary  1. Normal left ventricular size and systolic function. Left ventricular  ejection fraction is visually estimated to be 60%. Normal left ventricular  wall thickness. E/e' is < 8, suggesting normal LV filling pressures .  2. Normal right ventricular size and systolic function.  3. Mildly enlarged left atrium.     Physical Examination Review of Systems   Vitals:    02/27/19 0747   BP: 136/58   Pulse: 98   Resp: 20     Body mass index is 27.67 kg/m .  Wt Readings from Last 3 Encounters:   02/27/19 182 lb (82.6 kg)   01/08/18 169 lb (76.7 kg)   02/14/17 186 lb 1.6 oz (84.4 kg)       General Appearance:   no distress, normal body habitus   ENT/Mouth: membranes moist, no oral lesions or bleeding gums.      EYES:  no scleral icterus, normal conjunctivae   Neck: no carotid bruits or thyromegaly   Chest/Lungs:   lungs are clear to auscultation, no rales or wheezing, no sternal scar, equal chest wall expansion    Cardiovascular:   Regular. Normal first and second heart sounds with no murmurs, rubs, or gallops; the carotid, radial and posterior tibial pulses are intact, Jugular venous pressure 6-8 cm, no edema bilaterally.     Abdomen:  no organomegaly, masses, bruits, or tenderness; bowel sounds are present   Extremities: no cyanosis or clubbing   Skin: no xanthelasma, warm.    Neurologic: normal gait, normal  bilateral, no tremors     Psychiatric: alert and oriented x3,  "calm     General: WNL  Eyes: WNL  Ears/Nose/Throat: Hearing Loss  Lungs: WNL  Heart: WNL  Stomach: WNL  Bladder: WNL  Muscle/Joints: Joint Pain  Skin: WNL  Nervous System: WNL  Mental Health: WNL     Blood: WNL     Medical History  Surgical History Family History Social History   Past Medical History:   Diagnosis Date   ? Ataxia    ? Chest pain    ? CIDP (chronic inflammatory demyelinating polyneuropathy) (H)     Remnant is that his sense of balance is impaired, particularly at night.   ? Coronary artery disease    ? HLD (hyperlipidemia)    ? HTN (hypertension)    ? Hyperlipidemia    ? Hypertension    ? Sleep apnea     Past Surgical History:   Procedure Laterality Date   ? APPENDECTOMY     ? CARDIAC CATHETERIZATION  12/06/2016   ? CARDIAC CATHETERIZATION N/A 12/6/2016    Procedure: Coronary Angiogram;  Surgeon: Duane Gadnhi MD;  Location: [facility] Cath Lab;  Service:    ? CORONARY STENT PLACEMENT  12/06/2016    [place] to RCA.   ? REVISION AMPUTATION OF FINGER      Top of right pointer finger - cut on    ? THROAT SURGERY      \"To remove some polyps in my throat.  No, they weren't cancer.\"   ? TONSILLECTOMY      Family History   Problem Relation Age of Onset   ? Heart attack Brother    ? Coronary artery disease Brother     Social History     Socioeconomic History   ? Marital status:      Spouse name: Not on file   ? Number of children: Not on file   ? Years of education: Not on file   ? Highest education level: Not on file   Occupational History   ? Not on file   Social Needs   ? Financial resource strain: Not on file   ? Food insecurity:     Worry: Not on file     Inability: Not on file   ? Transportation needs:     Medical: Not on file     Non-medical: Not on file   Tobacco Use   ? Smoking status: Current Every Day Smoker     Packs/day: 1.50     Years: 50.00     Pack years: 75.00   ? Smokeless tobacco: Never Used   Substance and Sexual Activity   ? Alcohol use: No     Comment: No alcohol " since 1978   ? Drug use: No   ? Sexual activity: Not on file   Lifestyle   ? Physical activity:     Days per week: Not on file     Minutes per session: Not on file   ? Stress: Not on file   Relationships   ? Social connections:     Talks on phone: Not on file     Gets together: Not on file     Attends Caodaism service: Not on file     Active member of club or organization: Not on file     Attends meetings of clubs or organizations: Not on file     Relationship status: Not on file   ? Intimate partner violence:     Fear of current or ex partner: Not on file     Emotionally abused: Not on file     Physically abused: Not on file     Forced sexual activity: Not on file   Other Topics Concern   ? Not on file   Social History Narrative   ? Not on file          Medications  Allergies   Current Outpatient Medications   Medication Sig Dispense Refill   ? amLODIPine (NORVASC) 10 MG tablet Take 1 tablet (10 mg total) by mouth daily. 90 tablet 3   ? aspirin 81 MG EC tablet Take 81 mg by mouth daily.     ? fluticasone (FLONASE) 50 mcg/actuation nasal spray Apply 1-2 sprays into each nostril as needed.      ? hydroCHLOROthiazide (HYDRODIURIL) 50 MG tablet Take 1 tablet (50 mg total) by mouth daily. 90 tablet 3   ? ibuprofen (ADVIL,MOTRIN) 200 MG tablet Take 600 mg by mouth as needed.     ? lisinopril (PRINIVIL,ZESTRIL) 40 MG tablet Take 1 tablet (40 mg total) by mouth at bedtime. 90 tablet 3   ? nitroglycerin (NITROSTAT) 0.4 MG SL tablet Place 1 tablet (0.4 mg total) under the tongue once as needed for chest pain. 90 tablet 12   ? potassium chloride SA (K-DUR,KLOR-CON) 20 MEQ tablet Take 2 tablets (40 mEq total) by mouth every morning. 180 tablet 3   ? atorvastatin (LIPITOR) 80 MG tablet Take 1 tablet (80 mg total) by mouth at bedtime. 90 tablet 3   ? clopidogrel (PLAVIX) 75 mg tablet Take 1 tablet (75 mg total) by mouth daily. 90 tablet 3     No current facility-administered medications for this visit.       No Known Allergies       Lab Results    Chemistry/lipid CBC Cardiac Enzymes/BNP/TSH/INR   Lab Results   Component Value Date    CHOL 138 09/10/2018    HDL 60 09/10/2018    LDLCALC 69 09/10/2018    TRIG 45 09/10/2018    CREATININE 0.84 09/10/2018    BUN 14 09/10/2018    K 3.8 09/10/2018     09/10/2018     09/10/2018    CO2 26 09/10/2018    Lab Results   Component Value Date    WBC 6.7 12/06/2016    HGB 16.3 12/06/2016    HCT 46.0 12/06/2016    MCV 87 12/06/2016     12/06/2016    Lab Results   Component Value Date    CKMB 2 12/06/2016    TROPONINI <0.01 11/23/2016    BNP 10 11/23/2016    TSH 2.29 09/10/2018    INR 0.98 11/23/2016

## 2021-06-29 NOTE — PROGRESS NOTES
"Progress Notes by Pablito Solis DO at 4/28/2020  8:10 AM     Author: Pablito Solis DO Service: -- Author Type: Physician    Filed: 4/28/2020  8:32 AM Encounter Date: 4/28/2020 Status: Signed    : Pablito Solis DO (Physician)           The patient has been notified of following:     \"This telephone visit will be conducted via a call between you and your physician/provider. We have found that certain health care needs can be provided without the need for a physical exam.  This service lets us provide the care you need with a phone conversation.  If a prescription is necessary we can send it directly to your pharmacy.  If lab work is needed we can place an order for that and you can then stop by our lab to have the test done at a later time. If during the course of the call the physician/provider feels a telephone visit is not appropriate, you will not be charged for this service.\" Verbal consent has been obtained for this service by care team member:         HEART CARE PHONE ENCOUNTER        The patient has chosen to have the visit conducted as a telephone visit, to reduce risk of exposure given the current status of Coronavirus in our community. This telephone visit is being conducted via a call between the patient and physician/provider. Health care needs are being provided without a physical exam.     Assessment/Recommendations   Assessment:    1. Coronary artery disease status post mid RCA drug eluting stent (Synergy 3.5 x 16m) with moderate diffuse CAD in LM, LAD, LCX.   Very mild stable dyspnea and chest discomfort with strenuous activity, resolving in <1 minute.   2. Hyperlipidemia, LDL:81 (non-fasting), 69 (fasting)  3. Essential Hypertension, controlled.   4. History of sleep apnea.    5. Tobacco Abuse   6.  Seasonal allergies     Plan:  1. Continue atorvastatin to 80 mg daily for HLD and CAD.    2. Continue ASA 81 mg daily, lifelong.  3. Continue amlodipine, HCTZ and " lisinopril daily for HTN.   4. Consider discontinuation of smoking, again discussed with patient (>3 minutes).    5. He will continue Flonase for seasonal allergies    Follow Up Plan: 12 months  I have reviewed the note as documented.  This accurately captures the substance of my conversation with the patient.    Total time of call between patient and provider was 10 minutes   Start Time:8:15 AM  Stop Time:8:27 AM        History of Present Illness/Subjective    Martell Patel is a 70 y.o. male who is being evaluated via a billable telephone visit.      Patient is seen today via telephone encounter given he is unable to do video encounter.    Going to the patient he has had a stable course of the past year.  He said no recent infections.  No recent hospitalizations.  Continues to be active doing woodworking and yard work.  He states when he does strenuous activities and continues at a high pace of yardwork he occasionally has very mild discomfort across his chest and some dyspnea which immediately resolves.  He has had this symptom for well over a year with no progression in symptoms, intensity or duration of symptoms.  Symptoms saul after about 1 minute of rest.  Otherwise he is asymptomatic.  Denies any orthopnea PND symptoms.  Denies any lower extremity edema.  He continues to smoke which we did discuss again.  Blood pressure has been well controlled at home.  Lipids are near goal.  He is unsure if his last lipids were fasting.  Prior fasting lipids were well controlled at 69.  He continues on atorvastatin at 80 mg daily.  Taking aspirin daily.    I have reviewed and updated the patient's Past Medical History, Social History, Family History and Medication List.     Physical Examination not performed given phone encounter Review of Systems      Review of Systems - History obtained from the patient  General ROS: negative  Psychological ROS: negative  Ophthalmic ROS: negative  ENT ROS: negative  Allergy and  "Immunology ROS: negative  Hematological and Lymphatic ROS: negative  Endocrine ROS: negative  Respiratory ROS: no cough, shortness of breath, or wheezing  Cardiovascular ROS: positive for - n/a  negative for - chest pain. Mild dyspnea on exertion.  Gastrointestinal ROS: no abdominal pain, change in bowel habits, or black or bloody stools  Genito-Urinary ROS: no dysuria, trouble voiding, or hematuria  Musculoskeletal ROS: negative  Neurological ROS: no TIA or stroke symptoms  Dermatological ROS: negative       Medical History  Surgical History Family History Social History   Past Medical History:   Diagnosis Date   ? Ataxia    ? Chest pain    ? CIDP (chronic inflammatory demyelinating polyneuropathy) (H)     Remnant is that his sense of balance is impaired, particularly at night.   ? Coronary artery disease    ? HLD (hyperlipidemia)    ? HTN (hypertension)    ? Hyperlipidemia    ? Hypertension    ? Sleep apnea     Past Surgical History:   Procedure Laterality Date   ? APPENDECTOMY     ? CARDIAC CATHETERIZATION  12/06/2016   ? CARDIAC CATHETERIZATION N/A 12/6/2016    Procedure: Coronary Angiogram;  Surgeon: Duane Gandhi MD;  Location: [facility] Cath Lab;  Service:    ? CORONARY STENT PLACEMENT  12/06/2016    [place] to RCA.   ? REVISION AMPUTATION OF FINGER      Top of right pointer finger - cut on    ? THROAT SURGERY      \"To remove some polyps in my throat.  No, they weren't cancer.\"   ? TONSILLECTOMY      Family History   Problem Relation Age of Onset   ? Heart attack Brother    ? Coronary artery disease Brother     Social History     Socioeconomic History   ? Marital status:      Spouse name: Not on file   ? Number of children: Not on file   ? Years of education: Not on file   ? Highest education level: Not on file   Occupational History   ? Not on file   Social Needs   ? Financial resource strain: Not on file   ? Food insecurity     Worry: Not on file     Inability: Not on file   ? " Transportation needs     Medical: Not on file     Non-medical: Not on file   Tobacco Use   ? Smoking status: Current Every Day Smoker     Packs/day: 1.50     Years: 50.00     Pack years: 75.00   ? Smokeless tobacco: Never Used   Substance and Sexual Activity   ? Alcohol use: No     Comment: No alcohol since 1978   ? Drug use: No   ? Sexual activity: Not on file   Lifestyle   ? Physical activity     Days per week: Not on file     Minutes per session: Not on file   ? Stress: Not on file   Relationships   ? Social connections     Talks on phone: Not on file     Gets together: Not on file     Attends Cheondoism service: Not on file     Active member of club or organization: Not on file     Attends meetings of clubs or organizations: Not on file     Relationship status: Not on file   ? Intimate partner violence     Fear of current or ex partner: Not on file     Emotionally abused: Not on file     Physically abused: Not on file     Forced sexual activity: Not on file   Other Topics Concern   ? Not on file   Social History Narrative   ? Not on file          Medications  Allergies   Current Outpatient Medications   Medication Sig Dispense Refill   ? amLODIPine (NORVASC) 10 MG tablet Take 1 tablet (10 mg total) by mouth daily. 90 tablet 3   ? aspirin 81 MG EC tablet Take 162 mg by mouth daily.      ? atorvastatin (LIPITOR) 20 MG tablet Take 20 mg by mouth at bedtime.     ? fluticasone (FLONASE) 50 mcg/actuation nasal spray Apply 1-2 sprays into each nostril as needed.      ? hydroCHLOROthiazide (HYDRODIURIL) 50 MG tablet Take 1 tablet (50 mg total) by mouth daily. 90 tablet 0   ? lisinopril (PRINIVIL,ZESTRIL) 40 MG tablet Take 1 tablet (40 mg total) by mouth at bedtime. 90 tablet 3   ? nitroglycerin (NITROSTAT) 0.4 MG SL tablet Place 1 tablet (0.4 mg total) under the tongue once as needed for chest pain. 90 tablet 12   ? potassium chloride (K-DUR,KLOR-CON) 20 MEQ tablet Take 2 tablets (40 mEq total) by mouth every morning.  180 tablet 3   ? atorvastatin (LIPITOR) 80 MG tablet Take 1 tablet (80 mg total) by mouth at bedtime. 90 tablet 3   ? ibuprofen (ADVIL,MOTRIN) 200 MG tablet Take 600 mg by mouth as needed.       No current facility-administered medications for this visit.     No Known Allergies      Lab Results    Chemistry/lipid CBC Cardiac Enzymes/BNP/TSH/INR   Lab Results   Component Value Date    CHOL 148 03/03/2020    HDL 54 03/03/2020    LDLCALC 81 03/03/2020    TRIG 64 03/03/2020    CREATININE 0.84 03/03/2020    BUN 17 03/03/2020    K 3.6 03/03/2020     (L) 03/03/2020    CL 98 03/03/2020    CO2 25 03/03/2020    Lab Results   Component Value Date    WBC 6.7 12/06/2016    HGB 16.3 12/06/2016    HCT 46.0 12/06/2016    MCV 87 12/06/2016     12/06/2016    Lab Results   Component Value Date    CKMB 2 12/06/2016    TROPONINI <0.01 11/23/2016    BNP 10 11/23/2016    TSH 2.29 09/10/2018    INR 0.98 11/23/2016        Pablito Solis

## 2021-06-30 ENCOUNTER — AMBULATORY - HEALTHEAST (OUTPATIENT)
Dept: ULTRASOUND IMAGING | Facility: HOSPITAL | Age: 71
End: 2021-06-30

## 2021-06-30 DIAGNOSIS — Z11.59 ENCOUNTER FOR SCREENING FOR OTHER VIRAL DISEASES: ICD-10-CM

## 2021-07-02 ENCOUNTER — AMBULATORY - HEALTHEAST (OUTPATIENT)
Dept: LAB | Facility: CLINIC | Age: 71
End: 2021-07-02

## 2021-07-02 DIAGNOSIS — Z11.59 ENCOUNTER FOR SCREENING FOR OTHER VIRAL DISEASES: ICD-10-CM

## 2021-07-02 LAB
SARS-COV-2 PCR COMMENT: NORMAL
SARS-COV-2 RNA SPEC QL NAA+PROBE: NEGATIVE
SARS-COV-2 VIRUS SPECIMEN SOURCE: NORMAL

## 2021-07-03 ENCOUNTER — COMMUNICATION - HEALTHEAST (OUTPATIENT)
Dept: SCHEDULING | Facility: CLINIC | Age: 71
End: 2021-07-03

## 2021-07-03 NOTE — ADDENDUM NOTE
Addendum Note by Dania Camarena CMA at 10/1/2018 11:59 PM     Author: Dania Camarena CMA Service: -- Author Type: Certified Medical Assistant    Filed: 10/2/2018 12:25 PM Date of Service: 10/1/2018 11:59 PM Status: Signed    : Dania Camarena CMA (Certified Medical Assistant)    Encounter addended by: Dania Camarena CMA on: 10/2/2018 12:25 PM<BR>     Actions taken: Visit diagnoses modified, Charge Capture section accepted

## 2021-07-03 NOTE — ADDENDUM NOTE
Addendum Note by Dania Camarena CMA at 11/1/2018 11:59 PM     Author: Dania Camarena CMA Service: -- Author Type: Certified Medical Assistant    Filed: 11/5/2018 11:38 AM Date of Service: 11/1/2018 11:59 PM Status: Signed    : Dania Camarena CMA (Certified Medical Assistant)    Encounter addended by: Dania Camarena CMA on: 11/5/2018 11:38 AM<BR>     Actions taken: Charge Capture section accepted

## 2021-07-04 ENCOUNTER — COMMUNICATION - HEALTHEAST (OUTPATIENT)
Dept: SCHEDULING | Facility: CLINIC | Age: 71
End: 2021-07-04

## 2021-07-04 NOTE — LETTER
Letter by Pablito Solis DO at      Author: Pablito Solis DO Service: -- Author Type: --    Filed:  Encounter Date: 5/28/2021 Status: (Other)         Martell Patel  [address] [place] [address]      May 28, 2021      Dear Martell,    This letter is to remind you that you will be due for your follow up appointment with Dr. Pablito Solis in June, 2021 . To help ensure you are in the best health possible, a regular follow-up with your cardiologist is essential.     Please call our Patient Scheduling Line at [phone] to schedule your appointment at your earliest convenience.  If you have recently scheduled an appointment, please disregard this letter.    We look forward to seeing you again. As always, we are available at the number  above for any questions or concerns you may have.      Sincerely,     The Physicians and Staff of [facility]

## 2021-07-05 ENCOUNTER — HOSPITAL ENCOUNTER (OUTPATIENT)
Dept: ULTRASOUND IMAGING | Facility: HOSPITAL | Age: 71
Discharge: HOME | End: 2021-07-05
Attending: FAMILY MEDICINE
Payer: MEDICARE

## 2021-07-06 ENCOUNTER — HOSPITAL ENCOUNTER (OUTPATIENT)
Dept: ULTRASOUND IMAGING | Facility: HOSPITAL | Age: 71
Discharge: HOME | End: 2021-07-06
Attending: FAMILY MEDICINE
Payer: MEDICARE

## 2021-07-06 DIAGNOSIS — K11.8 PAROTID MASS: ICD-10-CM

## 2021-07-07 LAB
CAP COMMENT: NORMAL
LAB AP CHARGES (HE HISTORICAL CONVERSION): NORMAL
LAB AP INITIAL CYTO EVAL (HE HISTORICAL CONVERSION): NORMAL
LAB MED GENERAL PATH INTERP (HE HISTORICAL CONVERSION): NORMAL
PATH REPORT.COMMENTS IMP SPEC: NORMAL
PATH REPORT.COMMENTS IMP SPEC: NORMAL
PATH REPORT.FINAL DX SPEC: NORMAL
PATH REPORT.MICROSCOPIC SPEC OTHER STN: NORMAL
PATH REPORT.RELEVANT HX SPEC: NORMAL
RESULT FLAG (HE HISTORICAL CONVERSION): NORMAL
SPECIMEN DESCRIPTION: NORMAL

## 2021-07-19 ENCOUNTER — LAB REQUISITION (OUTPATIENT)
Dept: LAB | Facility: CLINIC | Age: 71
End: 2021-07-19
Payer: COMMERCIAL

## 2021-07-19 DIAGNOSIS — R22.1 LOCALIZED SWELLING, MASS AND LUMP, NECK: ICD-10-CM

## 2021-07-19 LAB
ANION GAP SERPL CALCULATED.3IONS-SCNC: 13 MMOL/L (ref 5–18)
BUN SERPL-MCNC: 16 MG/DL (ref 8–28)
CALCIUM SERPL-MCNC: 9.2 MG/DL (ref 8.5–10.5)
CHLORIDE BLD-SCNC: 98 MMOL/L (ref 98–107)
CO2 SERPL-SCNC: 28 MMOL/L (ref 22–31)
CREAT SERPL-MCNC: 1.03 MG/DL (ref 0.7–1.3)
GFR SERPL CREATININE-BSD FRML MDRD: 72 ML/MIN/1.73M2
GLUCOSE BLD-MCNC: 144 MG/DL (ref 70–125)
POTASSIUM BLD-SCNC: 3.8 MMOL/L (ref 3.5–5)
SODIUM SERPL-SCNC: 139 MMOL/L (ref 136–145)

## 2021-07-19 PROCEDURE — 80048 BASIC METABOLIC PNL TOTAL CA: CPT | Mod: ORL | Performed by: FAMILY MEDICINE

## 2021-11-19 ENCOUNTER — OFFICE VISIT (OUTPATIENT)
Dept: CARDIOLOGY | Facility: CLINIC | Age: 71
End: 2021-11-19
Payer: COMMERCIAL

## 2021-11-19 VITALS
HEART RATE: 96 BPM | RESPIRATION RATE: 16 BRPM | DIASTOLIC BLOOD PRESSURE: 62 MMHG | HEIGHT: 67 IN | WEIGHT: 195.6 LBS | SYSTOLIC BLOOD PRESSURE: 122 MMHG | BODY MASS INDEX: 30.7 KG/M2

## 2021-11-19 DIAGNOSIS — I10 ESSENTIAL HYPERTENSION: ICD-10-CM

## 2021-11-19 DIAGNOSIS — E78.2 MIXED HYPERLIPIDEMIA: ICD-10-CM

## 2021-11-19 DIAGNOSIS — I25.110 CORONARY ARTERY DISEASE INVOLVING NATIVE CORONARY ARTERY OF NATIVE HEART WITH UNSTABLE ANGINA PECTORIS (H): Primary | ICD-10-CM

## 2021-11-19 PROCEDURE — 99215 OFFICE O/P EST HI 40 MIN: CPT | Performed by: INTERNAL MEDICINE

## 2021-11-19 RX ORDER — ATORVASTATIN CALCIUM 80 MG/1
80 TABLET, FILM COATED ORAL AT BEDTIME
COMMUNITY
Start: 2021-09-01 | End: 2022-09-23

## 2021-11-19 RX ORDER — LISINOPRIL 40 MG/1
40 TABLET ORAL DAILY
Status: ON HOLD | COMMUNITY
Start: 2021-05-20 | End: 2022-03-25

## 2021-11-19 RX ORDER — NITROGLYCERIN 0.4 MG/1
0.4 TABLET SUBLINGUAL PRN
COMMUNITY
End: 2022-04-11

## 2021-11-19 RX ORDER — FLUTICASONE PROPIONATE 50 MCG
SPRAY, SUSPENSION (ML) NASAL
COMMUNITY
Start: 2020-04-28

## 2021-11-19 RX ORDER — POTASSIUM CHLORIDE 1500 MG/1
20 TABLET, EXTENDED RELEASE ORAL DAILY
Status: ON HOLD | COMMUNITY
Start: 2021-09-22 | End: 2022-03-25

## 2021-11-19 ASSESSMENT — MIFFLIN-ST. JEOR: SCORE: 1590.87

## 2021-11-19 NOTE — LETTER
11/19/2021    Isela Jay MD  [facility] [address]  [place] [address]    RE: Martell Patel       Dear Colleague,    I had the pleasure of seeing Martell Patel in the [facility].      HEART CARE ENCOUNTER CONSULTATON NOTE      [facility]  [phone]      Assessment/Recommendations   Assessment:    1. Coronary artery disease status post mid RCA drug eluting stent (Synergy 3.5 x 16m) with moderate diffuse CAD in LM, LAD, LCX.  Significant chest pain event a few days ago . This is new for the patient have such significant chest pain episode.  I patient is aware this could be a significant and life-threatening condition with his heart and to not ignore this in the future.  2. Hyperlipidemia, LDL:79  LDL Cholesterol Calculated   Date Value Ref Range Status   06/02/2021 79 <=129 mg/dL Final   3. Essential Hypertension, controlled.   4. History of sleep apnea.    5. Tobacco Abuse   6.  Seasonal allergies     Plan:  1. NM stress testing given recent chest pain events and ongoing angina.  Patient is unable to exercise on a treadmill due to chronic right knee issues.  2.  PRN SLNG   3. Call instruction and instruction about going to ED if patient develops chest pain discussed in detail with the patient.  Avoid snow shoveling at this time.  4. Continue atorvastatin to 80 mg daily for HLD and CAD.   5. Continue ASA 81 mg daily, lifelong.  6. Continue amlodipine, HCTZ and lisinopril daily for HTN.     Today's clinic visit entailed:  Review of prior external note(s) from - Outside records from [facility]  Review of the result(s) of each unique test - ECG, coronary angiogram  The following tests were independently interpreted by me as noted in my documentation: ECG most recent  Ordering of each unique test  Prescription drug management  The level of medical decision making during this visit was of high complexity.         History of  Present Illness/Subjective    HPI: Martell Patel is a 71 year old male coronary artery disease, hypertension, hyperlipidemia, active smoker presents to cardiology clinic in follow-up.    Couple nights ago the patient had a severe episode of chest pressure lasting for 30 minutes.  He noted his heart was racing at the time and he took 2 aspirins.  Over the course of 30 minutes his pain gradually improved.  With this he felt pressure across his chest with radiation.  And shortness of breath.  Has not had any similar episodes like this prior.  He does get chest pressure when he exerts himself at a high level which is been ongoing for quite some time.  He has not had a severe episode of chest pain similar to this since his prior heart attack a few years ago.  Currently is asymptomatic in the clinic today.  He did not inform his wife about the episode until next morning.    Unscheduled the patient regarding his chest pain episode.  Given his history I would be concerned about a coronary issue.  He is agreeable to undergoing stress testing.  He is aware that if he has a similar episode he needs to consider coming to the emergency department.  Discussed use of nitroglycerin.  Also advised that he does not shovel if we do get snowfall in the next few weeks before stress testing.    Echocardiogram Results:2016, reviewed results  1 . Normal left ventricular size and systolic function. Left ventricular   ejection fraction is visually estimated to be 60%. Normal left ventricular   wall thickness. E/e' is < 8, suggesting normal LV filling pressures .   2. Normal right ventricular size and systolic function.   3. Mildly enlarged left atrium.   4.  No significant valvular abnormalities      Coronary Angiogram: 12/6/2016, reviewed results    Ost Cx lesion 40% stenosed.    A STENT SYNERGY MR [id number] drug eluting stent was successfully placed.    Mid RCA lesion 95% stenosed.    TR band closure for radial right access     "Prasugrel load    LM lesion 35% stenosed.    Prox LAD lesion 35% stenosed.    Mid RCA to Dist RCA lesion 40% stenosed.      ECG: Normal sinus rhythm.  Q waves in 2 out of 3 leads and inferior leads.  Normal IL interval.  Normal QRS interval.  Personally reviewed ECG from 7/19/2021       Physical Examination  Review of Systems   Vitals: /62 (BP Location: Right arm, Patient Position: Sitting, Cuff Size: Adult Regular)   Pulse 96   Resp 16   Ht 1.702 m (5' 7\")   Wt 88.7 kg (195 lb 9.6 oz)   BMI 30.64 kg/m    BMI= Body mass index is 30.64 kg/m .  Wt Readings from Last 3 Encounters:   11/19/21 88.7 kg (195 lb 9.6 oz)   04/28/20 81.6 kg (180 lb)   02/27/19 82.6 kg (182 lb)           ENT/Mouth: membranes moist, no oral lesions or bleeding gums.      EYES:  no scleral icterus, normal conjunctivae       Chest/Lungs:   lungs are clear to auscultation, no rales or wheezing, no sternal scar, equal chest wall expansion    Cardiovascular:   Regular. Normal first and second heart sounds with no murmurs, rubs, or gallops; the carotid, radial and posterior tibial pulses are intact, Jugular venous pressure noraml, no pitting edema bilaterally    Abdomen:  no tenderness; bowel sounds are present   Extremities: no cyanosis or clubbing   Skin: no xanthelasma, warm.    Neurologic: normal  bilateral, no tremors     Psychiatric: alert and oriented x3, calm        Please refer above for cardiac ROS details.        Medical History  Surgical History Family History Social History   Past Medical History:   Diagnosis Date     Arthritis      Ataxia      Chest pain      CIDP (chronic inflammatory demyelinating polyneuropathy) (H)     Remnant is that his sense of balance is impaired, particularly at night.     Coronary artery disease      HLD (hyperlipidemia)      HTN (hypertension)      Hyperlipidemia      Hypertension      Hypertension      Sleep apnea      Past Surgical History:   Procedure Laterality Date     APPENDECTOMY       " "APPENDECTOMY       CARDIAC CATHETERIZATION  12/06/2016     CARDIAC CATHETERIZATION N/A 12/6/2016    Procedure: Coronary Angiogram;  Surgeon: Duane Gandhi MD;  Location: [facility];  Service:      CORONARY STENT PLACEMENT  12/06/2016    [place] to RCA.     ENT SURGERY      tonsilectomy     ORTHOPEDIC SURGERY       REVISION AMPUTATION OF FINGER      Top of right pointer finger - cut on      THROAT SURGERY      \"To remove some polyps in my throat.  No, they weren't cancer.\"     TONSILLECTOMY       US BIOPSY FINE NEEDLE ASPIRATION LYMPH NODE (BREAST) LEFT Left 7/6/2021     Family History   Problem Relation Age of Onset     Coronary Artery Disease Brother      Coronary Artery Disease Brother         Social History     Socioeconomic History     Marital status:      Spouse name: Not on file     Number of children: Not on file     Years of education: Not on file     Highest education level: Not on file   Occupational History     Not on file   Tobacco Use     Smoking status: Current Every Day Smoker     Packs/day: 1.50     Years: 50.00     Pack years: 75.00     Smokeless tobacco: Never Used   Substance and Sexual Activity     Alcohol use: No     Comment: Alcoholic Drinks/day: No alcohol since 1978     Drug use: No     Sexual activity: Not on file   Other Topics Concern     Not on file   Social History Narrative     Not on file     Social Determinants of Health     Financial Resource Strain: Not on file   Food Insecurity: Not on file   Transportation Needs: Not on file   Physical Activity: Not on file   Stress: Not on file   Social Connections: Not on file   Intimate Partner Violence: Not on file   Housing Stability: Not on file           Medications  Allergies   Current Outpatient Medications   Medication Sig Dispense Refill     amLODIPine (NORVASC) 10 MG tablet Take 10 mg by mouth daily.       ASPIRIN Take 81 mg by mouth daily.       hydrochlorothiazide (MICROZIDE) 12.5 MG capsule Take 12.5 mg " by mouth daily.       ibuprofen (ADVIL,MOTRIN) 200 MG tablet Take 3 tablets by mouth every 8 hours as needed for pain. 30 tablet 0     LISINOPRIL PO Take  by mouth.       POTASSIUM CHLORIDE CR PO Take  by mouth.       HYDROcodone-acetaminophen 5-325 MG per tablet Take 1-2 tablets by mouth every 4 hours as needed for pain. (Patient not taking: Reported on 11/19/2021) 15 tablet 0     No Known Allergies       Lab Results    Chemistry/lipid CBC Cardiac Enzymes/BNP/TSH/INR   Recent Labs   Lab Test 06/02/21  1028   CHOL 142   HDL 51   LDL 79   TRIG 62     Recent Labs   Lab Test 06/02/21  1028 03/03/20  1217 09/10/18  0751   LDL 79 81 69     Recent Labs   Lab Test 07/19/21  1346      POTASSIUM 3.8   CHLORIDE 98   CO2 28   *   BUN 16   CR 1.03   GFRESTIMATED 72   TIMOTHY 9.2     Recent Labs   Lab Test 07/19/21  1346 06/02/21  1028 11/23/20  1005   CR 1.03 1.00 1.04     No results for input(s): A1C in the last 48497 hours.       No results for input(s): WBC, HGB, HCT, MCV, PLT in the last 46171 hours.  No results for input(s): HGB in the last 97626 hours. No results for input(s): TROPONINI in the last 07609 hours.  No results for input(s): BNP, NTBNPI, NTBNP in the last 09171 hours.  Recent Labs   Lab Test 09/10/18  0751   TSH 2.29     No results for input(s): INR in the last 63985 hours.     Pablito Solis DO      Thank you for allowing me to participate in the care of your patient.      Sincerely,     Pablito Solis DO   [facility]  cc: No referring provider defined for this encounter.

## 2021-11-19 NOTE — PROGRESS NOTES
HEART CARE ENCOUNTER CONSULTATON NOTE      [facility]  [phone]      Assessment/Recommendations   Assessment:    1. Coronary artery disease status post mid RCA drug eluting stent (Synergy 3.5 x 16m) with moderate diffuse CAD in LM, LAD, LCX.  Significant chest pain event a few days ago . This is new for the patient have such significant chest pain episode.  I patient is aware this could be a significant and life-threatening condition with his heart and to not ignore this in the future.  2. Hyperlipidemia, LDL:79  LDL Cholesterol Calculated   Date Value Ref Range Status   06/02/2021 79 <=129 mg/dL Final   3. Essential Hypertension, controlled.   4. History of sleep apnea.    5. Tobacco Abuse   6.  Seasonal allergies     Plan:  1. NM stress testing given recent chest pain events and ongoing angina.  Patient is unable to exercise on a treadmill due to chronic right knee issues.  2.  PRN SLNG   3. Call instruction and instruction about going to ED if patient develops chest pain discussed in detail with the patient.  Avoid snow shoveling at this time.  4. Continue atorvastatin to 80 mg daily for HLD and CAD.   5. Continue ASA 81 mg daily, lifelong.  6. Continue amlodipine, HCTZ and lisinopril daily for HTN.     Today's clinic visit entailed:  Review of prior external note(s) from - Outside records from [facility]  Review of the result(s) of each unique test - ECG, coronary angiogram  The following tests were independently interpreted by me as noted in my documentation: ECG most recent  Ordering of each unique test  Prescription drug management  The level of medical decision making during this visit was of high complexity.         History of Present Illness/Subjective    HPI: Martell Patel is a 71 year old male coronary artery disease, hypertension, hyperlipidemia, active smoker presents to cardiology clinic in follow-up.    Couple nights ago the patient had a severe episode of chest pressure lasting  for 30 minutes.  He noted his heart was racing at the time and he took 2 aspirins.  Over the course of 30 minutes his pain gradually improved.  With this he felt pressure across his chest with radiation.  And shortness of breath.  Has not had any similar episodes like this prior.  He does get chest pressure when he exerts himself at a high level which is been ongoing for quite some time.  He has not had a severe episode of chest pain similar to this since his prior heart attack a few years ago.  Currently is asymptomatic in the clinic today.  He did not inform his wife about the episode until next morning.    Unscheduled the patient regarding his chest pain episode.  Given his history I would be concerned about a coronary issue.  He is agreeable to undergoing stress testing.  He is aware that if he has a similar episode he needs to consider coming to the emergency department.  Discussed use of nitroglycerin.  Also advised that he does not shovel if we do get snowfall in the next few weeks before stress testing.    Echocardiogram Results:2016, reviewed results  1 . Normal left ventricular size and systolic function. Left ventricular   ejection fraction is visually estimated to be 60%. Normal left ventricular   wall thickness. E/e' is < 8, suggesting normal LV filling pressures .   2. Normal right ventricular size and systolic function.   3. Mildly enlarged left atrium.   4.  No significant valvular abnormalities      Coronary Angiogram: 12/6/2016, reviewed results    Ost Cx lesion 40% stenosed.    A STENT SYNERGY MR [id number] drug eluting stent was successfully placed.    Mid RCA lesion 95% stenosed.    TR band closure for radial right access    Prasugrel load    LM lesion 35% stenosed.    Prox LAD lesion 35% stenosed.    Mid RCA to Dist RCA lesion 40% stenosed.      ECG: Normal sinus rhythm.  Q waves in 2 out of 3 leads and inferior leads.  Normal OK interval.  Normal QRS interval.  Personally reviewed ECG  "from 7/19/2021       Physical Examination  Review of Systems   Vitals: /62 (BP Location: Right arm, Patient Position: Sitting, Cuff Size: Adult Regular)   Pulse 96   Resp 16   Ht 1.702 m (5' 7\")   Wt 88.7 kg (195 lb 9.6 oz)   BMI 30.64 kg/m    BMI= Body mass index is 30.64 kg/m .  Wt Readings from Last 3 Encounters:   11/19/21 88.7 kg (195 lb 9.6 oz)   04/28/20 81.6 kg (180 lb)   02/27/19 82.6 kg (182 lb)           ENT/Mouth: membranes moist, no oral lesions or bleeding gums.      EYES:  no scleral icterus, normal conjunctivae       Chest/Lungs:   lungs are clear to auscultation, no rales or wheezing, no sternal scar, equal chest wall expansion    Cardiovascular:   Regular. Normal first and second heart sounds with no murmurs, rubs, or gallops; the carotid, radial and posterior tibial pulses are intact, Jugular venous pressure noraml, no pitting edema bilaterally    Abdomen:  no tenderness; bowel sounds are present   Extremities: no cyanosis or clubbing   Skin: no xanthelasma, warm.    Neurologic: normal  bilateral, no tremors     Psychiatric: alert and oriented x3, calm        Please refer above for cardiac ROS details.        Medical History  Surgical History Family History Social History   Past Medical History:   Diagnosis Date     Arthritis      Ataxia      Chest pain      CIDP (chronic inflammatory demyelinating polyneuropathy) (H)     Remnant is that his sense of balance is impaired, particularly at night.     Coronary artery disease      HLD (hyperlipidemia)      HTN (hypertension)      Hyperlipidemia      Hypertension      Hypertension      Sleep apnea      Past Surgical History:   Procedure Laterality Date     APPENDECTOMY       APPENDECTOMY       CARDIAC CATHETERIZATION  12/06/2016     CARDIAC CATHETERIZATION N/A 12/6/2016    Procedure: Coronary Angiogram;  Surgeon: Duane Gandhi MD;  Location: [facility];  Service:      CORONARY STENT PLACEMENT  12/06/2016    [place] to RCA.     " "ENT SURGERY      tonsilectomy     ORTHOPEDIC SURGERY       REVISION AMPUTATION OF FINGER      Top of right pointer finger - cut on      THROAT SURGERY      \"To remove some polyps in my throat.  No, they weren't cancer.\"     TONSILLECTOMY       US BIOPSY FINE NEEDLE ASPIRATION LYMPH NODE (BREAST) LEFT Left 7/6/2021     Family History   Problem Relation Age of Onset     Coronary Artery Disease Brother      Coronary Artery Disease Brother         Social History     Socioeconomic History     Marital status:      Spouse name: Not on file     Number of children: Not on file     Years of education: Not on file     Highest education level: Not on file   Occupational History     Not on file   Tobacco Use     Smoking status: Current Every Day Smoker     Packs/day: 1.50     Years: 50.00     Pack years: 75.00     Smokeless tobacco: Never Used   Substance and Sexual Activity     Alcohol use: No     Comment: Alcoholic Drinks/day: No alcohol since 1978     Drug use: No     Sexual activity: Not on file   Other Topics Concern     Not on file   Social History Narrative     Not on file     Social Determinants of Health     Financial Resource Strain: Not on file   Food Insecurity: Not on file   Transportation Needs: Not on file   Physical Activity: Not on file   Stress: Not on file   Social Connections: Not on file   Intimate Partner Violence: Not on file   Housing Stability: Not on file           Medications  Allergies   Current Outpatient Medications   Medication Sig Dispense Refill     amLODIPine (NORVASC) 10 MG tablet Take 10 mg by mouth daily.       ASPIRIN Take 81 mg by mouth daily.       hydrochlorothiazide (MICROZIDE) 12.5 MG capsule Take 12.5 mg by mouth daily.       ibuprofen (ADVIL,MOTRIN) 200 MG tablet Take 3 tablets by mouth every 8 hours as needed for pain. 30 tablet 0     LISINOPRIL PO Take  by mouth.       POTASSIUM CHLORIDE CR PO Take  by mouth.       HYDROcodone-acetaminophen 5-325 MG per tablet " Take 1-2 tablets by mouth every 4 hours as needed for pain. (Patient not taking: Reported on 11/19/2021) 15 tablet 0     No Known Allergies       Lab Results    Chemistry/lipid CBC Cardiac Enzymes/BNP/TSH/INR   Recent Labs   Lab Test 06/02/21  1028   CHOL 142   HDL 51   LDL 79   TRIG 62     Recent Labs   Lab Test 06/02/21  1028 03/03/20  1217 09/10/18  0751   LDL 79 81 69     Recent Labs   Lab Test 07/19/21  1346      POTASSIUM 3.8   CHLORIDE 98   CO2 28   *   BUN 16   CR 1.03   GFRESTIMATED 72   TIMOTHY 9.2     Recent Labs   Lab Test 07/19/21  1346 06/02/21  1028 11/23/20  1005   CR 1.03 1.00 1.04     No results for input(s): A1C in the last 80995 hours.       No results for input(s): WBC, HGB, HCT, MCV, PLT in the last 35997 hours.  No results for input(s): HGB in the last 07864 hours. No results for input(s): TROPONINI in the last 36833 hours.  No results for input(s): BNP, NTBNPI, NTBNP in the last 89541 hours.  Recent Labs   Lab Test 09/10/18  0751   TSH 2.29     No results for input(s): INR in the last 10353 hours.     Pablito Solis, DO

## 2021-11-22 ENCOUNTER — HOSPITAL ENCOUNTER (OUTPATIENT)
Dept: NUCLEAR MEDICINE | Facility: HOSPITAL | Age: 71
End: 2021-11-22
Attending: INTERNAL MEDICINE
Payer: COMMERCIAL

## 2021-11-22 ENCOUNTER — HOSPITAL ENCOUNTER (OUTPATIENT)
Dept: CARDIOLOGY | Facility: HOSPITAL | Age: 71
End: 2021-11-22
Attending: INTERNAL MEDICINE
Payer: COMMERCIAL

## 2021-11-22 ENCOUNTER — TELEPHONE (OUTPATIENT)
Dept: CARDIOLOGY | Facility: CLINIC | Age: 71
End: 2021-11-22

## 2021-11-22 DIAGNOSIS — I10 ESSENTIAL HYPERTENSION: ICD-10-CM

## 2021-11-22 DIAGNOSIS — E78.2 MIXED HYPERLIPIDEMIA: ICD-10-CM

## 2021-11-22 DIAGNOSIS — I25.110 CORONARY ARTERY DISEASE INVOLVING NATIVE CORONARY ARTERY OF NATIVE HEART WITH UNSTABLE ANGINA PECTORIS (H): Primary | ICD-10-CM

## 2021-11-22 DIAGNOSIS — I25.110 CORONARY ARTERY DISEASE INVOLVING NATIVE CORONARY ARTERY OF NATIVE HEART WITH UNSTABLE ANGINA PECTORIS (H): ICD-10-CM

## 2021-11-22 LAB
CV STRESS CURRENT BP HE: NORMAL
CV STRESS CURRENT HR HE: 100
CV STRESS CURRENT HR HE: 100
CV STRESS CURRENT HR HE: 103
CV STRESS CURRENT HR HE: 104
CV STRESS CURRENT HR HE: 105
CV STRESS CURRENT HR HE: 105
CV STRESS CURRENT HR HE: 83
CV STRESS CURRENT HR HE: 84
CV STRESS CURRENT HR HE: 89
CV STRESS CURRENT HR HE: 89
CV STRESS CURRENT HR HE: 97
CV STRESS CURRENT HR HE: 98
CV STRESS CURRENT HR HE: 98
CV STRESS CURRENT HR HE: 99
CV STRESS DEVIATION TIME HE: NORMAL
CV STRESS ECHO PERCENT HR HE: NORMAL
CV STRESS EXERCISE STAGE HE: NORMAL
CV STRESS FINAL RESTING BP HE: NORMAL
CV STRESS FINAL RESTING HR HE: 98
CV STRESS MAX HR HE: 105
CV STRESS MAX TREADMILL GRADE HE: 0
CV STRESS MAX TREADMILL SPEED HE: 0
CV STRESS PEAK DIA BP HE: NORMAL
CV STRESS PEAK SYS BP HE: NORMAL
CV STRESS PHASE HE: NORMAL
CV STRESS PROTOCOL HE: NORMAL
CV STRESS RESTING PT POSITION HE: NORMAL
CV STRESS ST DEVIATION AMOUNT HE: NORMAL
CV STRESS ST DEVIATION ELEVATION HE: NORMAL
CV STRESS ST EVELATION AMOUNT HE: NORMAL
CV STRESS TEST TYPE HE: NORMAL
CV STRESS TOTAL STAGE TIME MIN 1 HE: NORMAL
NUC STRESS EJECTION FRACTION: 60 %
RATE PRESSURE PRODUCT: NORMAL
STRESS ECHO BASELINE DIASTOLIC HE: 74
STRESS ECHO BASELINE HR: 79
STRESS ECHO BASELINE SYSTOLIC BP: 134
STRESS ECHO CALCULATED PERCENT HR: 71 %
STRESS ECHO LAST STRESS DIASTOLIC BP: 72
STRESS ECHO LAST STRESS HR: 100
STRESS ECHO LAST STRESS SYSTOLIC BP: 160
STRESS ECHO TARGET HR: 147

## 2021-11-22 PROCEDURE — A9500 TC99M SESTAMIBI: HCPCS | Performed by: INTERNAL MEDICINE

## 2021-11-22 PROCEDURE — 93018 CV STRESS TEST I&R ONLY: CPT | Performed by: INTERNAL MEDICINE

## 2021-11-22 PROCEDURE — 343N000001 HC RX 343: Performed by: INTERNAL MEDICINE

## 2021-11-22 PROCEDURE — 78452 HT MUSCLE IMAGE SPECT MULT: CPT | Mod: 26 | Performed by: INTERNAL MEDICINE

## 2021-11-22 PROCEDURE — 93017 CV STRESS TEST TRACING ONLY: CPT | Performed by: INTERNAL MEDICINE

## 2021-11-22 PROCEDURE — 93016 CV STRESS TEST SUPVJ ONLY: CPT | Performed by: INTERNAL MEDICINE

## 2021-11-22 PROCEDURE — 250N000011 HC RX IP 250 OP 636: Performed by: INTERNAL MEDICINE

## 2021-11-22 PROCEDURE — 78452 HT MUSCLE IMAGE SPECT MULT: CPT

## 2021-11-22 RX ORDER — REGADENOSON 0.08 MG/ML
0.4 INJECTION, SOLUTION INTRAVENOUS ONCE
Status: COMPLETED | OUTPATIENT
Start: 2021-11-22 | End: 2021-11-22

## 2021-11-22 RX ORDER — ALBUTEROL SULFATE 0.83 MG/ML
2.5 SOLUTION RESPIRATORY (INHALATION)
Status: DISCONTINUED | OUTPATIENT
Start: 2021-11-22 | End: 2021-11-22 | Stop reason: HOSPADM

## 2021-11-22 RX ORDER — AMINOPHYLLINE 25 MG/ML
50 INJECTION, SOLUTION INTRAVENOUS
Status: DISCONTINUED | OUTPATIENT
Start: 2021-11-22 | End: 2021-11-22 | Stop reason: HOSPADM

## 2021-11-22 RX ORDER — ISOSORBIDE MONONITRATE 30 MG/1
30 TABLET, EXTENDED RELEASE ORAL DAILY
Qty: 30 TABLET | Refills: 1 | Status: SHIPPED | OUTPATIENT
Start: 2021-11-22 | End: 2022-03-21

## 2021-11-22 RX ORDER — CAFFEINE CITRATE 20 MG/ML
60 SOLUTION INTRAVENOUS
Status: DISCONTINUED | OUTPATIENT
Start: 2021-11-22 | End: 2021-11-22 | Stop reason: HOSPADM

## 2021-11-22 RX ORDER — CAFFEINE 200 MG
200 TABLET ORAL
Status: DISCONTINUED | OUTPATIENT
Start: 2021-11-22 | End: 2021-11-22 | Stop reason: HOSPADM

## 2021-11-22 RX ADMIN — REGADENOSON 0.4 MG: 0.08 INJECTION, SOLUTION INTRAVENOUS at 12:37

## 2021-11-22 RX ADMIN — Medication 31.1 MILLICURIE: at 12:30

## 2021-11-22 RX ADMIN — Medication 8.3 MCI.: at 11:43

## 2021-11-22 NOTE — RESULT ENCOUNTER NOTE
Please inform the patient that there was a small perfusion defect.  Overall low risk findings.  Would recommend the patient try imdur 30 mg daily to increase coronary blood flow.  If further chest pain symptoms would then recommend coronary angiogram with possible PCI

## 2021-11-22 NOTE — TELEPHONE ENCOUNTER
----- Message from Pablito Solis DO sent at 11/22/2021  2:32 PM CST -----  Please inform the patient that there was a small perfusion defect.  Overall low risk findings.  Would recommend the patient try imdur 30 mg daily to increase coronary blood flow.  If further chest pain symptoms would then recommend coronary angiogram with possible PCI      Phone call to patient and wife Sarah. Reviewed response and recommendations per Dr. Solis. Both patient and wife verbalized understanding and agreement with plan.   Medication sent to patient's pharmacy per MAT. Provided patient with clinic contact information and advised to call back if symptoms persist despite initiating medical therapy. -denny

## 2022-01-03 ENCOUNTER — TRANSFERRED RECORDS (OUTPATIENT)
Dept: HEALTH INFORMATION MANAGEMENT | Facility: CLINIC | Age: 72
End: 2022-01-03

## 2022-01-10 ENCOUNTER — TRANSFERRED RECORDS (OUTPATIENT)
Dept: HEALTH INFORMATION MANAGEMENT | Facility: CLINIC | Age: 72
End: 2022-01-10

## 2022-01-11 ENCOUNTER — LAB REQUISITION (OUTPATIENT)
Dept: LAB | Facility: CLINIC | Age: 72
End: 2022-01-11
Payer: COMMERCIAL

## 2022-01-11 DIAGNOSIS — R41.3 OTHER AMNESIA: ICD-10-CM

## 2022-01-11 LAB
FOLATE SERPL-MCNC: 8.6 NG/ML
PSA SERPL-MCNC: 2.9 UG/L (ref 0–6.5)
TSH SERPL DL<=0.005 MIU/L-ACNC: 3.13 UIU/ML (ref 0.3–5)
VIT B12 SERPL-MCNC: 412 PG/ML (ref 213–816)

## 2022-01-11 PROCEDURE — 82607 VITAMIN B-12: CPT | Mod: ORL | Performed by: FAMILY MEDICINE

## 2022-01-11 PROCEDURE — 84443 ASSAY THYROID STIM HORMONE: CPT | Mod: ORL | Performed by: FAMILY MEDICINE

## 2022-01-11 PROCEDURE — 82746 ASSAY OF FOLIC ACID SERUM: CPT | Mod: ORL | Performed by: FAMILY MEDICINE

## 2022-01-11 PROCEDURE — G0103 PSA SCREENING: HCPCS | Mod: ORL | Performed by: FAMILY MEDICINE

## 2022-03-23 ENCOUNTER — APPOINTMENT (OUTPATIENT)
Dept: OCCUPATIONAL THERAPY | Facility: CLINIC | Age: 72
DRG: 247 | End: 2022-03-23
Attending: STUDENT IN AN ORGANIZED HEALTH CARE EDUCATION/TRAINING PROGRAM
Payer: COMMERCIAL

## 2022-03-23 ENCOUNTER — APPOINTMENT (OUTPATIENT)
Dept: CARDIOLOGY | Facility: CLINIC | Age: 72
DRG: 247 | End: 2022-03-23
Attending: INTERNAL MEDICINE
Payer: COMMERCIAL

## 2022-03-23 ENCOUNTER — RESEARCH ENCOUNTER (OUTPATIENT)
Dept: CARDIOLOGY | Facility: CLINIC | Age: 72
End: 2022-03-23

## 2022-03-23 ENCOUNTER — HOSPITAL ENCOUNTER (INPATIENT)
Facility: CLINIC | Age: 72
LOS: 2 days | Discharge: HOME | DRG: 247 | End: 2022-03-25
Attending: EMERGENCY MEDICINE | Admitting: STUDENT IN AN ORGANIZED HEALTH CARE EDUCATION/TRAINING PROGRAM
Payer: COMMERCIAL

## 2022-03-23 DIAGNOSIS — R07.9 CHEST PAIN IN ADULT: ICD-10-CM

## 2022-03-23 DIAGNOSIS — Z95.5 STATUS POST INSERTION OF DRUG ELUTING CORONARY ARTERY STENT: Primary | ICD-10-CM

## 2022-03-23 DIAGNOSIS — I21.3 ST ELEVATION MYOCARDIAL INFARCTION (STEMI), UNSPECIFIED ARTERY (H): ICD-10-CM

## 2022-03-23 DIAGNOSIS — Z11.52 ENCOUNTER FOR SCREENING LABORATORY TESTING FOR SEVERE ACUTE RESPIRATORY SYNDROME CORONAVIRUS 2 (SARS-COV-2): ICD-10-CM

## 2022-03-23 DIAGNOSIS — F17.210 CIGARETTE SMOKER: ICD-10-CM

## 2022-03-23 DIAGNOSIS — I21.3 ST ELEVATION MI (STEMI) (H): ICD-10-CM

## 2022-03-23 DIAGNOSIS — I21.11 ST ELEVATION MYOCARDIAL INFARCTION INVOLVING RIGHT CORONARY ARTERY (H): ICD-10-CM

## 2022-03-23 DIAGNOSIS — I10 ESSENTIAL HYPERTENSION: ICD-10-CM

## 2022-03-23 LAB
ACT BLD: 352 SECONDS (ref 74–150)
ALBUMIN SERPL-MCNC: 2.9 G/DL (ref 3.4–5)
ALBUMIN SERPL-MCNC: 3.6 G/DL (ref 3.4–5)
ALP SERPL-CCNC: 78 U/L (ref 40–150)
ALP SERPL-CCNC: 92 U/L (ref 40–150)
ALT SERPL W P-5'-P-CCNC: 26 U/L (ref 0–70)
ALT SERPL W P-5'-P-CCNC: 28 U/L (ref 0–70)
ANION GAP SERPL CALCULATED.3IONS-SCNC: 4 MMOL/L (ref 3–14)
ANION GAP SERPL CALCULATED.3IONS-SCNC: 5 MMOL/L (ref 3–14)
APTT PPP: 40 SECONDS (ref 22–38)
AST SERPL W P-5'-P-CCNC: 33 U/L (ref 0–45)
AST SERPL W P-5'-P-CCNC: 42 U/L (ref 0–45)
ATRIAL RATE - MUSE: 69 BPM
ATRIAL RATE - MUSE: 72 BPM
BASOPHILS # BLD AUTO: 0.1 10E3/UL (ref 0–0.2)
BASOPHILS # BLD AUTO: 0.1 10E3/UL (ref 0–0.2)
BASOPHILS NFR BLD AUTO: 1 %
BASOPHILS NFR BLD AUTO: 2 %
BILIRUB DIRECT SERPL-MCNC: <0.1 MG/DL (ref 0–0.2)
BILIRUB SERPL-MCNC: 0.5 MG/DL (ref 0.2–1.3)
BILIRUB SERPL-MCNC: 0.6 MG/DL (ref 0.2–1.3)
BUN SERPL-MCNC: 17 MG/DL (ref 7–30)
BUN SERPL-MCNC: 17 MG/DL (ref 7–30)
CALCIUM SERPL-MCNC: 8.7 MG/DL (ref 8.5–10.1)
CALCIUM SERPL-MCNC: 9.2 MG/DL (ref 8.5–10.1)
CHLORIDE BLD-SCNC: 105 MMOL/L (ref 94–109)
CHLORIDE BLD-SCNC: 107 MMOL/L (ref 94–109)
CHOLEST SERPL-MCNC: 138 MG/DL
CO2 SERPL-SCNC: 27 MMOL/L (ref 20–32)
CO2 SERPL-SCNC: 28 MMOL/L (ref 20–32)
CREAT SERPL-MCNC: 0.9 MG/DL (ref 0.66–1.25)
CREAT SERPL-MCNC: 0.94 MG/DL (ref 0.66–1.25)
DIASTOLIC BLOOD PRESSURE - MUSE: NORMAL MMHG
DIASTOLIC BLOOD PRESSURE - MUSE: NORMAL MMHG
EOSINOPHIL # BLD AUTO: 0 10E3/UL (ref 0–0.7)
EOSINOPHIL # BLD AUTO: 0.3 10E3/UL (ref 0–0.7)
EOSINOPHIL NFR BLD AUTO: 0 %
EOSINOPHIL NFR BLD AUTO: 3 %
ERYTHROCYTE [DISTWIDTH] IN BLOOD BY AUTOMATED COUNT: 13.4 % (ref 10–15)
ERYTHROCYTE [DISTWIDTH] IN BLOOD BY AUTOMATED COUNT: 13.4 % (ref 10–15)
GFR SERPL CREATININE-BSD FRML MDRD: 85 ML/MIN/1.73M2
GFR SERPL CREATININE-BSD FRML MDRD: 90 ML/MIN/1.73M2
GLUCOSE BLD-MCNC: 116 MG/DL (ref 70–99)
GLUCOSE BLD-MCNC: 128 MG/DL (ref 70–99)
HBA1C MFR BLD: 5.6 % (ref 0–5.6)
HCT VFR BLD AUTO: 43.9 % (ref 40–53)
HCT VFR BLD AUTO: 48.1 % (ref 40–53)
HDLC SERPL-MCNC: 57 MG/DL
HGB BLD-MCNC: 14.7 G/DL (ref 13.3–17.7)
HGB BLD-MCNC: 16.6 G/DL (ref 13.3–17.7)
HOLD SPECIMEN: NORMAL
IMM GRANULOCYTES # BLD: 0 10E3/UL
IMM GRANULOCYTES # BLD: 0 10E3/UL
IMM GRANULOCYTES NFR BLD: 0 %
IMM GRANULOCYTES NFR BLD: 1 %
INR PPP: 1.02 (ref 0.85–1.15)
INTERPRETATION ECG - MUSE: NORMAL
INTERPRETATION ECG - MUSE: NORMAL
LDLC SERPL CALC-MCNC: 72 MG/DL
LVEF ECHO: NORMAL
LYMPHOCYTES # BLD AUTO: 0.8 10E3/UL (ref 0.8–5.3)
LYMPHOCYTES # BLD AUTO: 2.4 10E3/UL (ref 0.8–5.3)
LYMPHOCYTES NFR BLD AUTO: 10 %
LYMPHOCYTES NFR BLD AUTO: 30 %
MAGNESIUM SERPL-MCNC: 2 MG/DL (ref 1.6–2.3)
MAGNESIUM SERPL-MCNC: 2 MG/DL (ref 1.6–2.3)
MCH RBC QN AUTO: 29.6 PG (ref 26.5–33)
MCH RBC QN AUTO: 29.8 PG (ref 26.5–33)
MCHC RBC AUTO-ENTMCNC: 33.5 G/DL (ref 31.5–36.5)
MCHC RBC AUTO-ENTMCNC: 34.5 G/DL (ref 31.5–36.5)
MCV RBC AUTO: 86 FL (ref 78–100)
MCV RBC AUTO: 89 FL (ref 78–100)
MONOCYTES # BLD AUTO: 0.2 10E3/UL (ref 0–1.3)
MONOCYTES # BLD AUTO: 0.6 10E3/UL (ref 0–1.3)
MONOCYTES NFR BLD AUTO: 3 %
MONOCYTES NFR BLD AUTO: 7 %
NEUTROPHILS # BLD AUTO: 4.8 10E3/UL (ref 1.6–8.3)
NEUTROPHILS # BLD AUTO: 6.8 10E3/UL (ref 1.6–8.3)
NEUTROPHILS NFR BLD AUTO: 58 %
NEUTROPHILS NFR BLD AUTO: 85 %
NONHDLC SERPL-MCNC: 81 MG/DL
NRBC # BLD AUTO: 0 10E3/UL
NRBC # BLD AUTO: 0 10E3/UL
NRBC BLD AUTO-RTO: 0 /100
NRBC BLD AUTO-RTO: 0 /100
P AXIS - MUSE: 77 DEGREES
P AXIS - MUSE: 83 DEGREES
PHOSPHATE SERPL-MCNC: 2.4 MG/DL (ref 2.5–4.5)
PHOSPHATE SERPL-MCNC: 3.3 MG/DL (ref 2.5–4.5)
PLATELET # BLD AUTO: 132 10E3/UL (ref 150–450)
PLATELET # BLD AUTO: 150 10E3/UL (ref 150–450)
POTASSIUM BLD-SCNC: 3.3 MMOL/L (ref 3.4–5.3)
POTASSIUM BLD-SCNC: 3.7 MMOL/L (ref 3.4–5.3)
POTASSIUM BLD-SCNC: 4.1 MMOL/L (ref 3.4–5.3)
PR INTERVAL - MUSE: 170 MS
PR INTERVAL - MUSE: 176 MS
PROT SERPL-MCNC: 5.9 G/DL (ref 6.8–8.8)
PROT SERPL-MCNC: 7.3 G/DL (ref 6.8–8.8)
QRS DURATION - MUSE: 82 MS
QRS DURATION - MUSE: 90 MS
QT - MUSE: 414 MS
QT - MUSE: 416 MS
QTC - MUSE: 445 MS
QTC - MUSE: 453 MS
R AXIS - MUSE: -3 DEGREES
R AXIS - MUSE: -8 DEGREES
RBC # BLD AUTO: 4.96 10E6/UL (ref 4.4–5.9)
RBC # BLD AUTO: 5.57 10E6/UL (ref 4.4–5.9)
SARS-COV-2 RNA RESP QL NAA+PROBE: NEGATIVE
SODIUM SERPL-SCNC: 138 MMOL/L (ref 133–144)
SODIUM SERPL-SCNC: 138 MMOL/L (ref 133–144)
SYSTOLIC BLOOD PRESSURE - MUSE: NORMAL MMHG
SYSTOLIC BLOOD PRESSURE - MUSE: NORMAL MMHG
T AXIS - MUSE: 83 DEGREES
T AXIS - MUSE: 89 DEGREES
TRIGL SERPL-MCNC: 45 MG/DL
TROPONIN I SERPL HS-MCNC: 12 NG/L
TROPONIN I SERPL HS-MCNC: ABNORMAL NG/L
TROPONIN T BLD-MCNC: 0 UG/L
VENTRICULAR RATE- MUSE: 69 BPM
VENTRICULAR RATE- MUSE: 72 BPM
WBC # BLD AUTO: 8 10E3/UL (ref 4–11)
WBC # BLD AUTO: 8.2 10E3/UL (ref 4–11)

## 2022-03-23 PROCEDURE — 250N000011 HC RX IP 250 OP 636: Performed by: STUDENT IN AN ORGANIZED HEALTH CARE EDUCATION/TRAINING PROGRAM

## 2022-03-23 PROCEDURE — 93454 CORONARY ARTERY ANGIO S&I: CPT | Performed by: INTERNAL MEDICINE

## 2022-03-23 PROCEDURE — C1887 CATHETER, GUIDING: HCPCS | Performed by: INTERNAL MEDICINE

## 2022-03-23 PROCEDURE — 93306 TTE W/DOPPLER COMPLETE: CPT | Mod: 26 | Performed by: STUDENT IN AN ORGANIZED HEALTH CARE EDUCATION/TRAINING PROGRAM

## 2022-03-23 PROCEDURE — 93010 ELECTROCARDIOGRAM REPORT: CPT | Mod: 76 | Performed by: INTERNAL MEDICINE

## 2022-03-23 PROCEDURE — 93005 ELECTROCARDIOGRAM TRACING: CPT

## 2022-03-23 PROCEDURE — 85610 PROTHROMBIN TIME: CPT | Performed by: EMERGENCY MEDICINE

## 2022-03-23 PROCEDURE — 200N000002 HC R&B ICU UMMC

## 2022-03-23 PROCEDURE — 250N000011 HC RX IP 250 OP 636: Performed by: INTERNAL MEDICINE

## 2022-03-23 PROCEDURE — 99285 EMERGENCY DEPT VISIT HI MDM: CPT | Mod: 25 | Performed by: EMERGENCY MEDICINE

## 2022-03-23 PROCEDURE — C1725 CATH, TRANSLUMIN NON-LASER: HCPCS | Performed by: INTERNAL MEDICINE

## 2022-03-23 PROCEDURE — 85025 COMPLETE CBC W/AUTO DIFF WBC: CPT | Performed by: INTERNAL MEDICINE

## 2022-03-23 PROCEDURE — 93010 ELECTROCARDIOGRAM REPORT: CPT | Performed by: EMERGENCY MEDICINE

## 2022-03-23 PROCEDURE — 84100 ASSAY OF PHOSPHORUS: CPT

## 2022-03-23 PROCEDURE — 83735 ASSAY OF MAGNESIUM: CPT

## 2022-03-23 PROCEDURE — 84132 ASSAY OF SERUM POTASSIUM: CPT

## 2022-03-23 PROCEDURE — 84155 ASSAY OF PROTEIN SERUM: CPT | Performed by: INTERNAL MEDICINE

## 2022-03-23 PROCEDURE — 84100 ASSAY OF PHOSPHORUS: CPT | Performed by: INTERNAL MEDICINE

## 2022-03-23 PROCEDURE — 272N000002 HC OR SUPPLY OTHER OPNP: Performed by: INTERNAL MEDICINE

## 2022-03-23 PROCEDURE — 99291 CRITICAL CARE FIRST HOUR: CPT | Mod: 25 | Performed by: STUDENT IN AN ORGANIZED HEALTH CARE EDUCATION/TRAINING PROGRAM

## 2022-03-23 PROCEDURE — 93005 ELECTROCARDIOGRAM TRACING: CPT | Performed by: EMERGENCY MEDICINE

## 2022-03-23 PROCEDURE — C1874 STENT, COATED/COV W/DEL SYS: HCPCS | Performed by: INTERNAL MEDICINE

## 2022-03-23 PROCEDURE — 84484 ASSAY OF TROPONIN QUANT: CPT

## 2022-03-23 PROCEDURE — 250N000013 HC RX MED GY IP 250 OP 250 PS 637: Performed by: INTERNAL MEDICINE

## 2022-03-23 PROCEDURE — 3E033PZ INTRODUCTION OF PLATELET INHIBITOR INTO PERIPHERAL VEIN, PERCUTANEOUS APPROACH: ICD-10-PCS | Performed by: INTERNAL MEDICINE

## 2022-03-23 PROCEDURE — 85347 COAGULATION TIME ACTIVATED: CPT

## 2022-03-23 PROCEDURE — 83735 ASSAY OF MAGNESIUM: CPT | Performed by: INTERNAL MEDICINE

## 2022-03-23 PROCEDURE — 97165 OT EVAL LOW COMPLEX 30 MIN: CPT | Mod: GO | Performed by: OCCUPATIONAL THERAPIST

## 2022-03-23 PROCEDURE — 99291 CRITICAL CARE FIRST HOUR: CPT | Mod: 25 | Performed by: EMERGENCY MEDICINE

## 2022-03-23 PROCEDURE — U0005 INFEC AGEN DETEC AMPLI PROBE: HCPCS | Performed by: EMERGENCY MEDICINE

## 2022-03-23 PROCEDURE — 82248 BILIRUBIN DIRECT: CPT | Performed by: INTERNAL MEDICINE

## 2022-03-23 PROCEDURE — 250N000013 HC RX MED GY IP 250 OP 250 PS 637: Performed by: STUDENT IN AN ORGANIZED HEALTH CARE EDUCATION/TRAINING PROGRAM

## 2022-03-23 PROCEDURE — 84484 ASSAY OF TROPONIN QUANT: CPT | Performed by: EMERGENCY MEDICINE

## 2022-03-23 PROCEDURE — C1894 INTRO/SHEATH, NON-LASER: HCPCS | Performed by: INTERNAL MEDICINE

## 2022-03-23 PROCEDURE — 36415 COLL VENOUS BLD VENIPUNCTURE: CPT

## 2022-03-23 PROCEDURE — 027034Z DILATION OF CORONARY ARTERY, ONE ARTERY WITH DRUG-ELUTING INTRALUMINAL DEVICE, PERCUTANEOUS APPROACH: ICD-10-PCS | Performed by: INTERNAL MEDICINE

## 2022-03-23 PROCEDURE — 36415 COLL VENOUS BLD VENIPUNCTURE: CPT | Performed by: EMERGENCY MEDICINE

## 2022-03-23 PROCEDURE — B2111ZZ FLUOROSCOPY OF MULTIPLE CORONARY ARTERIES USING LOW OSMOLAR CONTRAST: ICD-10-PCS | Performed by: INTERNAL MEDICINE

## 2022-03-23 PROCEDURE — 250N000013 HC RX MED GY IP 250 OP 250 PS 637: Performed by: EMERGENCY MEDICINE

## 2022-03-23 PROCEDURE — 250N000013 HC RX MED GY IP 250 OP 250 PS 637

## 2022-03-23 PROCEDURE — C1760 CLOSURE DEV, VASC: HCPCS | Performed by: INTERNAL MEDICINE

## 2022-03-23 PROCEDURE — C9606 PERC D-E COR REVASC W AMI S: HCPCS | Mod: RC | Performed by: INTERNAL MEDICINE

## 2022-03-23 PROCEDURE — 80061 LIPID PANEL: CPT | Performed by: INTERNAL MEDICINE

## 2022-03-23 PROCEDURE — 250N000009 HC RX 250: Performed by: INTERNAL MEDICINE

## 2022-03-23 PROCEDURE — 97530 THERAPEUTIC ACTIVITIES: CPT | Mod: GO | Performed by: OCCUPATIONAL THERAPIST

## 2022-03-23 PROCEDURE — 85730 THROMBOPLASTIN TIME PARTIAL: CPT | Performed by: EMERGENCY MEDICINE

## 2022-03-23 PROCEDURE — 272N000001 HC OR GENERAL SUPPLY STERILE: Performed by: INTERNAL MEDICINE

## 2022-03-23 PROCEDURE — 83036 HEMOGLOBIN GLYCOSYLATED A1C: CPT | Performed by: INTERNAL MEDICINE

## 2022-03-23 PROCEDURE — C9803 HOPD COVID-19 SPEC COLLECT: HCPCS | Performed by: EMERGENCY MEDICINE

## 2022-03-23 PROCEDURE — C1769 GUIDE WIRE: HCPCS | Performed by: INTERNAL MEDICINE

## 2022-03-23 PROCEDURE — 82947 ASSAY GLUCOSE BLOOD QUANT: CPT | Performed by: EMERGENCY MEDICINE

## 2022-03-23 PROCEDURE — 85025 COMPLETE CBC W/AUTO DIFF WBC: CPT | Performed by: EMERGENCY MEDICINE

## 2022-03-23 PROCEDURE — 36415 COLL VENOUS BLD VENIPUNCTURE: CPT | Performed by: INTERNAL MEDICINE

## 2022-03-23 PROCEDURE — 255N000002 HC RX 255 OP 636: Performed by: STUDENT IN AN ORGANIZED HEALTH CARE EDUCATION/TRAINING PROGRAM

## 2022-03-23 DEVICE — STENT CORONARY DES SYNERGY XD MR US 3.00X16MM H7493941816300: Type: IMPLANTABLE DEVICE | Status: FUNCTIONAL

## 2022-03-23 DEVICE — CLOSURE ANGIOSEAL 6FR 610130: Type: IMPLANTABLE DEVICE | Status: FUNCTIONAL

## 2022-03-23 RX ORDER — HEPARIN SODIUM 1000 [USP'U]/ML
INJECTION, SOLUTION INTRAVENOUS; SUBCUTANEOUS
Status: DISCONTINUED | OUTPATIENT
Start: 2022-03-23 | End: 2022-03-23

## 2022-03-23 RX ORDER — ATORVASTATIN CALCIUM 80 MG/1
80 TABLET, FILM COATED ORAL DAILY
Status: DISCONTINUED | OUTPATIENT
Start: 2022-03-23 | End: 2022-03-25 | Stop reason: HOSPADM

## 2022-03-23 RX ORDER — NALOXONE HYDROCHLORIDE 0.4 MG/ML
0.2 INJECTION, SOLUTION INTRAMUSCULAR; INTRAVENOUS; SUBCUTANEOUS
Status: DISCONTINUED | OUTPATIENT
Start: 2022-03-23 | End: 2022-03-25 | Stop reason: HOSPADM

## 2022-03-23 RX ORDER — NALOXONE HYDROCHLORIDE 0.4 MG/ML
0.4 INJECTION, SOLUTION INTRAMUSCULAR; INTRAVENOUS; SUBCUTANEOUS
Status: DISCONTINUED | OUTPATIENT
Start: 2022-03-23 | End: 2022-03-25 | Stop reason: HOSPADM

## 2022-03-23 RX ORDER — FENTANYL CITRATE 50 UG/ML
25 INJECTION, SOLUTION INTRAMUSCULAR; INTRAVENOUS
Status: DISCONTINUED | OUTPATIENT
Start: 2022-03-23 | End: 2022-03-23

## 2022-03-23 RX ORDER — NALOXONE HYDROCHLORIDE 0.4 MG/ML
0.2 INJECTION, SOLUTION INTRAMUSCULAR; INTRAVENOUS; SUBCUTANEOUS
Status: DISCONTINUED | OUTPATIENT
Start: 2022-03-23 | End: 2022-03-23

## 2022-03-23 RX ORDER — ONDANSETRON 4 MG/1
4 TABLET, ORALLY DISINTEGRATING ORAL EVERY 6 HOURS PRN
Status: DISCONTINUED | OUTPATIENT
Start: 2022-03-23 | End: 2022-03-25 | Stop reason: HOSPADM

## 2022-03-23 RX ORDER — IOPAMIDOL 755 MG/ML
INJECTION, SOLUTION INTRAVASCULAR
Status: DISCONTINUED | OUTPATIENT
Start: 2022-03-23 | End: 2022-03-23

## 2022-03-23 RX ORDER — ATORVASTATIN CALCIUM 80 MG/1
80 TABLET, FILM COATED ORAL DAILY
Qty: 90 TABLET | Refills: 3 | Status: SHIPPED | OUTPATIENT
Start: 2022-03-23 | End: 2022-03-25

## 2022-03-23 RX ORDER — POTASSIUM CHLORIDE 750 MG/1
40 TABLET, EXTENDED RELEASE ORAL ONCE
Status: COMPLETED | OUTPATIENT
Start: 2022-03-23 | End: 2022-03-23

## 2022-03-23 RX ORDER — NITROGLYCERIN 0.4 MG/1
0.4 TABLET SUBLINGUAL EVERY 5 MIN PRN
Status: DISCONTINUED | OUTPATIENT
Start: 2022-03-23 | End: 2022-03-23

## 2022-03-23 RX ORDER — SODIUM CHLORIDE 9 MG/ML
INJECTION, SOLUTION INTRAVENOUS CONTINUOUS
Status: DISCONTINUED | OUTPATIENT
Start: 2022-03-23 | End: 2022-03-23 | Stop reason: CLARIF

## 2022-03-23 RX ORDER — ONDANSETRON 2 MG/ML
INJECTION INTRAMUSCULAR; INTRAVENOUS
Status: DISCONTINUED | OUTPATIENT
Start: 2022-03-23 | End: 2022-03-23 | Stop reason: HOSPADM

## 2022-03-23 RX ORDER — ATROPINE SULFATE 0.1 MG/ML
0.5 INJECTION INTRAVENOUS
Status: DISCONTINUED | OUTPATIENT
Start: 2022-03-23 | End: 2022-03-23

## 2022-03-23 RX ORDER — NITROGLYCERIN 0.4 MG/1
0.4 TABLET SUBLINGUAL ONCE
Status: COMPLETED | OUTPATIENT
Start: 2022-03-23 | End: 2022-03-23

## 2022-03-23 RX ORDER — ONDANSETRON 2 MG/ML
4 INJECTION INTRAMUSCULAR; INTRAVENOUS EVERY 6 HOURS PRN
Status: DISCONTINUED | OUTPATIENT
Start: 2022-03-23 | End: 2022-03-25 | Stop reason: HOSPADM

## 2022-03-23 RX ORDER — NALOXONE HYDROCHLORIDE 0.4 MG/ML
0.4 INJECTION, SOLUTION INTRAMUSCULAR; INTRAVENOUS; SUBCUTANEOUS
Status: DISCONTINUED | OUTPATIENT
Start: 2022-03-23 | End: 2022-03-23

## 2022-03-23 RX ORDER — METOPROLOL TARTRATE 1 MG/ML
5 INJECTION, SOLUTION INTRAVENOUS
Status: DISCONTINUED | OUTPATIENT
Start: 2022-03-23 | End: 2022-03-23

## 2022-03-23 RX ORDER — NOREPINEPHRINE BITARTRATE 0.06 MG/ML
INJECTION, SOLUTION INTRAVENOUS CONTINUOUS PRN
Status: COMPLETED | OUTPATIENT
Start: 2022-03-23 | End: 2022-03-23

## 2022-03-23 RX ORDER — MORPHINE SULFATE 2 MG/ML
2 INJECTION, SOLUTION INTRAMUSCULAR; INTRAVENOUS ONCE
Status: DISCONTINUED | OUTPATIENT
Start: 2022-03-23 | End: 2022-03-23

## 2022-03-23 RX ORDER — FLUMAZENIL 0.1 MG/ML
0.2 INJECTION, SOLUTION INTRAVENOUS
Status: DISCONTINUED | OUTPATIENT
Start: 2022-03-23 | End: 2022-03-23

## 2022-03-23 RX ORDER — LIDOCAINE 40 MG/G
CREAM TOPICAL
Status: DISCONTINUED | OUTPATIENT
Start: 2022-03-23 | End: 2022-03-23

## 2022-03-23 RX ORDER — OXYCODONE HYDROCHLORIDE 10 MG/1
10 TABLET ORAL EVERY 4 HOURS PRN
Status: DISCONTINUED | OUTPATIENT
Start: 2022-03-23 | End: 2022-03-24

## 2022-03-23 RX ORDER — ATROPINE SULFATE 0.4 MG/ML
AMPUL (ML) INJECTION
Status: DISCONTINUED | OUTPATIENT
Start: 2022-03-23 | End: 2022-03-23

## 2022-03-23 RX ORDER — FENTANYL CITRATE-0.9 % NACL/PF 10 MCG/ML
PLASTIC BAG, INJECTION (ML) INTRAVENOUS
Status: DISCONTINUED | OUTPATIENT
Start: 2022-03-23 | End: 2022-03-23 | Stop reason: HOSPADM

## 2022-03-23 RX ORDER — ASPIRIN 81 MG/1
81 TABLET, CHEWABLE ORAL ONCE
Status: DISCONTINUED | OUTPATIENT
Start: 2022-03-23 | End: 2022-03-23

## 2022-03-23 RX ORDER — OXYCODONE HYDROCHLORIDE 5 MG/1
5 TABLET ORAL EVERY 4 HOURS PRN
Status: DISCONTINUED | OUTPATIENT
Start: 2022-03-23 | End: 2022-03-24

## 2022-03-23 RX ORDER — POTASSIUM CHLORIDE 750 MG/1
40 TABLET, EXTENDED RELEASE ORAL ONCE
Status: DISCONTINUED | OUTPATIENT
Start: 2022-03-23 | End: 2022-03-23

## 2022-03-23 RX ORDER — NITROGLYCERIN 0.4 MG/1
0.4 TABLET SUBLINGUAL EVERY 5 MIN PRN
Status: DISCONTINUED | OUTPATIENT
Start: 2022-03-23 | End: 2022-03-25 | Stop reason: HOSPADM

## 2022-03-23 RX ORDER — HYDRALAZINE HYDROCHLORIDE 20 MG/ML
10 INJECTION INTRAMUSCULAR; INTRAVENOUS EVERY 4 HOURS PRN
Status: DISCONTINUED | OUTPATIENT
Start: 2022-03-23 | End: 2022-03-23

## 2022-03-23 RX ORDER — HEPARIN SODIUM 5000 [USP'U]/.5ML
5000 INJECTION, SOLUTION INTRAVENOUS; SUBCUTANEOUS EVERY 12 HOURS
Status: DISCONTINUED | OUTPATIENT
Start: 2022-03-23 | End: 2022-03-23

## 2022-03-23 RX ORDER — EPTIFIBATIDE 2 MG/ML
2 INJECTION, SOLUTION INTRAVENOUS CONTINUOUS
Status: DISPENSED | OUTPATIENT
Start: 2022-03-23 | End: 2022-03-23

## 2022-03-23 RX ORDER — ASPIRIN 81 MG/1
81 TABLET ORAL DAILY
Status: DISCONTINUED | OUTPATIENT
Start: 2022-03-24 | End: 2022-03-25 | Stop reason: HOSPADM

## 2022-03-23 RX ADMIN — HUMAN ALBUMIN MICROSPHERES AND PERFLUTREN 6 ML: 10; .22 INJECTION, SOLUTION INTRAVENOUS at 08:54

## 2022-03-23 RX ADMIN — Medication 12.5 MG: at 12:44

## 2022-03-23 RX ADMIN — POTASSIUM & SODIUM PHOSPHATES POWDER PACK 280-160-250 MG 1 PACKET: 280-160-250 PACK at 19:44

## 2022-03-23 RX ADMIN — POTASSIUM CHLORIDE 40 MEQ: 750 TABLET, EXTENDED RELEASE ORAL at 11:43

## 2022-03-23 RX ADMIN — NITROGLYCERIN 0.4 MG: 0.4 TABLET SUBLINGUAL at 03:16

## 2022-03-23 RX ADMIN — HEPARIN SODIUM 5000 UNITS: 5000 INJECTION, SOLUTION INTRAVENOUS; SUBCUTANEOUS at 08:59

## 2022-03-23 RX ADMIN — TICAGRELOR 90 MG: 90 TABLET ORAL at 19:44

## 2022-03-23 RX ADMIN — POTASSIUM & SODIUM PHOSPHATES POWDER PACK 280-160-250 MG 1 PACKET: 280-160-250 PACK at 11:43

## 2022-03-23 RX ADMIN — EPTIFIBATIDE 2 MCG/KG/MIN: 2 INJECTION, SOLUTION INTRAVENOUS at 07:19

## 2022-03-23 RX ADMIN — ATORVASTATIN CALCIUM 80 MG: 80 TABLET, FILM COATED ORAL at 08:59

## 2022-03-23 RX ADMIN — Medication 12.5 MG: at 19:44

## 2022-03-23 RX ADMIN — POTASSIUM & SODIUM PHOSPHATES POWDER PACK 280-160-250 MG 1 PACKET: 280-160-250 PACK at 16:57

## 2022-03-23 ASSESSMENT — ACTIVITIES OF DAILY LIVING (ADL)
ADLS_ACUITY_SCORE: 9
ADLS_ACUITY_SCORE: 9
ADLS_ACUITY_SCORE: 7
ADLS_ACUITY_SCORE: 7
ADLS_ACUITY_SCORE: 9
ADLS_ACUITY_SCORE: 7
ADLS_ACUITY_SCORE: 7
ADLS_ACUITY_SCORE: 9
ADLS_ACUITY_SCORE: 9
ADLS_ACUITY_SCORE: 7
ADLS_ACUITY_SCORE: 9
ADLS_ACUITY_SCORE: 9
ADLS_ACUITY_SCORE: 7

## 2022-03-23 NOTE — H&P
Critical Care Cardiology: History and Physical  Martell Patel MRN: [id number]  Age: 72 year old, : 1950  Date: 3/23/2022    History of Present Illness     Martell Patel is a 72 year old male with PMHx of CAD s/p PCI/CHINA to RCA in 2016 (moderate 35-40% LAD & LCx dz), hypertension, hyperlipidemia and active smoking (1.5 PPD) who presented to the ED on 2022 around 2:45 am with chest tightness; EKG concerning for inferior STEMI s/p PCI/CHINA to distal RCA >> being admitted to the CICU for further monitoring.    The patient reported an episode of severe chest pressure that started about 45 minutes prior to arrival; reported being in bed-awake/watching a movie at the time of symptom onset.  He reported concomitant nausea, improvement in chest tightness after administration of sublingual NTG.  The patient reported similar symptoms to his cardiologist back in 2021- Lexiscan done at the time negative for inducible ischemia.  Since then, he endorsed progressively worse symptoms-recurrent chest tightness with minimal exertion.  He reported decrease in exercise tolerance for the past few months, shortness of breath with exertion.  Denied PND/orthopnea/pedal edema.  Denied palpitations presyncope or syncope. CV risk factors: Hypertension, hyperlipidemia, active smoking-1.5 packs per day.    On presentation to the ED, patient hemodynamically stable BP 130s/70s, however became hypotensive to 80s/50s shortly thereafter.  Heart rate 74 bpm at the time of presentation, decreased to 40 bpm with NTG administration and frequent ectopics. EKG in the ER concerning for inferior STEMI. Emergent coronary angiogram revealed a 95% lesion in the distal RCA and RPDA occlusion. Previously placed stent in the mid RCA was patent with mild in-stent restenosis.  He underwent successful PCI of the distal RCA lesion using a 3.0 x 16 mm Synergy drug-eluting stent.  However, the clot in the RPDA was embolized further distally, into a  very small and distal lumen of the vessel, therefore conservative management of the latter was pursued. He was loaded with Brilinta 180 mg and initiated on Integrilin gtt, to be continued for 12 hours post procedure.  Intra-procedure, the patient also became hypotensive, requiring 2 doses of 100 mcg phenylephrine injections (neosticks) as well as initiation of Levophed gtt., which was quickly weaned off after transfer to the cardiac ICU.    Review of Systems     ROS as mentioned in HPI. Detailed HPI could not be obtained as patient intubated and sedated.        Physical Exam     @Vitals: /77   Pulse 74   Temp 97.4  F (36.3  C) (Oral)   Resp 18   Wt 86.2 kg (190 lb)   SpO2 97%   BMI 29.76 kg/m      BMI= Body mass index is 29.76 kg/m .   GENERAL APPEARANCE: Awake, tired appearing.  HEENT: No icterus, PERRL 5mm/  CARDIOVASCULAR: Regular rate and rhythm, normal S1/S2, no S3/S4 and no murmur, click or rub.   RESP: Normal air entry bilaterally.   GASTRO: Soft, nontender.  EXTREMITIES: +1 edema in right leg, chronic-since knee surgery.  No pedal edema in left leg.  NEURO: Alert and oriented x3, no focal deficits identified.    Assessment and Plan     Martell Patel is a 72 year old male being admitted to the CICU on 3/23/2022. The patient has a PMHx of CAD s/p PCI/CHINA to RCA in 2016 (moderate 35-40% LAD & LCx dz), hypertension, hyperlipidemia and smoking who presents with chest tightness; EKG concerning for inferior STEMI >> rPDA occlusion with distal RCA stenosis; s/p PCI/CHINA to distal RCA.    Neurology:   # Monitor for ICU delirium     Plan:   - No active concerns. CTM.    Cardiovascular:   # Inferior STEMI s/p PCI/CHINA to distal RCA on 3/23/2022  # Coronary artery disease  # Hypertension, hlyperlpidemia    Imaging/procedures:   Coronary angiogram: Emergent coronary angiogram revealed a 95% lesion in the distal RCA and RPDA occlusion. Previously placed stent in the mid RCA was patent with mild in-stent  restenosis.  He underwent successful PCI of the distal RCA lesion using a 3.0 x 16 mm Synergy drug-eluting stent.  However, the clot in the RPDA was embolized further distally, into a very small and distal lumen of the vessel, therefore conservative management of the latter was pursued.   TTE: pending    Vasoactive/antiarrhythmic infusions:   Levophed gtt, quickly weaned off.     Plan:  - Status post loading with  mg & Brilinta 180 mg in the ER.   - Patient to remain on Integrilin gtt for 12 hours post PCI.  - Start Brilinta 90 mg BID this evening; and ASA 81 mg daily.  - Will need DAPT for atleast one year.   - Lipitor 80 mg daily.  Check lipid panel.  - Check TTE coming AM.  - Start B-blockers/ACEI once more consistently hemodynamically stable.     Pulmonary:  - No active concerns, continue to monitor respiratory status .     GI and Nutrition:   - Monitor daily LFTs.  - Cardiac diet.  - GI prophylaxis: not indicated.    Renal, Fluid, and Electrolytes:   # Monitor for GIULIA- HARINDER/ATN    Creatinine 0.94 mg/dL mg/dL on arrival (baseline).    Plan:   - Monitor urine output hourly. Strict I/O's  - Maintain K>4 and Mg>2.      Infectious Disease:    WBC 8.2 on arrival.    Plan:   - No active concerns.  - Monitor for signs of infection given cooling, lines, and leukocytosis.     Hematology and Oncology:   # DAPT for recent CHINA     HGB 16.6 PLT 132k INR 1.      Plan:   - Patient to remain on Integrilin gtt for 12 hours post PCI.  - Start Brilinta 90 mg BID this evening; and ASA 81 mg daily.   - DVT prophylaxis: heparin subcut.    Endocrine:   # Screening for T2DM    Plan:   - Check HbA1c, q6 glucose checks.    Lines:   PIVs.  Current lines are required for patient management      Family update by me today: Yes   Code Status: Full    The pt was discussed and evaluated with Tiffanie Weldon MD, attending physician, who agrees with the assessment and plan above.     Lorna Porras MD,   Cardiovascular  Disease Fellow  Pager [phone]

## 2022-03-23 NOTE — ED NOTES
Bed: ED27  Expected date: 3/23/22  Expected time: 2:40 AM  Means of arrival:   Comments:  SPF-Poss Stemi

## 2022-03-23 NOTE — Clinical Note
The first balloon was inserted into the right coronary artery and distal right coronary artery.Max pressure = 12 jeane. Total duration = 10 seconds.

## 2022-03-23 NOTE — PROGRESS NOTES
CLINICAL NUTRITION SERVICES    Reason for Assessment:  Heart-healthy nutrition education, received consult.    Diet History:  Pt reports history of receiving heart-healthy nutrition education in the past.  Reports following low sodium and low cholesterol diet at home. Currently has no questions.    Nutrition Diagnosis:  Food- and nutrition-related knowledge deficit r/t no previous knowledge of heart-healthy diet AEB pt report of no previous formal heart-healthy nutrition education.    Nutrition Prescription/Recs:  Continue heart-healthy diet.      Interventions:  Nutrition Education:    1.  Provided verbal instruction on a heart-healthy diet.  2.  Provided handouts:  Heart Health Guidelines (includes Heart Healthy Food Choices), Your Heart-Healthy Diet (Words You Will Hear)     Goals:    1.  Pt will verbalize at least four foods high in saturated or trans-fats and five foods high in sodium.    2.  Pt will list at least two interventions to make current meal plan more heart-healthy.     Follow-up:   Patient to ask any further nutrition-related questions before discharge.  In addition, pt may request outpatient RD appointment      Carolynn Handley, REED, MS, LD  SICU: [id number]

## 2022-03-23 NOTE — Clinical Note
The first balloon was inserted into the right coronary artery and distal right coronary artery.Max pressure = 12 jeane. Total duration = 6 seconds.

## 2022-03-23 NOTE — PROGRESS NOTES
03/23/22 1132   Quick Adds   Type of Visit Initial Occupational Therapy Evaluation  (CR)   Living Environment   People in Home spouse   Current Living Arrangements house   Home Accessibility stairs to enter home;stairs within home   Number of Stairs, Main Entrance 5   Number of Stairs, Within Home, Primary other (see comments)  (flight to basement)   Stair Railings, Within Home, Primary railings safe and in good condition   Transportation Anticipated family or friend will provide;car, drives self   Living Environment Comments Pt. lives with his spouse, lower level laundry, otherwise everything on main level.   Self-Care   Usual Activity Tolerance good   Current Activity Tolerance fair   Equipment Currently Used at Home cane, straight   Fall history within last six months no   Activity/Exercise/Self-Care Comment per pt. he is indep. with ADls/IADLs including yard work at baseline. amb. without AD for the most part, but uses a cane on occasion/longer distances/shopping,etc.   Instrumental Activities of Daily Living (IADL)   Previous Responsibilities yardwork;shopping;meal prep;housekeeping;laundry;medication management;finances;driving   IADL Comments shares IADl duties with spouse   General Information   Onset of Illness/Injury or Date of Surgery 03/23/22   Referring Physician Erin Dukes MD   General Observations and Info activity orders; ambulate   Cognitive Status Examination   Orientation Status orientation to person, place and time   Cognitive Status Comments cognition appears baseline; WFL. cont. to monitor/assess prn   Visual Perception   Visual Impairment/Limitations WFL   Sensory   Sensory Comments per pt. he as Bilat hand/feet neuropathy at baseline from [place]   Range of Motion Comprehensive   General Range of Motion no range of motion deficits identified   Strength Comprehensive (MMT)   General Manual Muscle Testing (MMT) Assessment no strength deficits identified  (general weakness)    Coordination   Upper Extremity Coordination No deficits were identified   Clinical Impression   Criteria for Skilled Therapeutic Interventions Met (OT) Yes, treatment indicated   OT Diagnosis impaired  ADLs/mob.   Influenced by the following impairments below baseline with act. tom.   OT Problem List-Impairments impacting ADL activity tolerance impaired;balance;strength;post-surgical precautions   ADL comments/analysis below baseline with act. tom./str./IADLs, mob.   Assessment of Occupational Performance 3-5 Performance Deficits   Identified Performance Deficits dressing, toileting, bathing, home mgmt.   Planned Therapy Interventions (OT) ADL retraining;strengthening;transfer training;home program guidelines;progressive activity/exercise   Clinical Decision Making Complexity (OT) low complexity   Anticipated Equipment Needs Upon Discharge (OT)   (TBD)   Risk & Benefits of therapy have been explained evaluation/treatment results reviewed;care plan/treatment goals reviewed;risks/benefits reviewed;current/potential barriers reviewed;participants voiced agreement with care plan;participants included;patient   OT Discharge Planning   OT Discharge Recommendation (DC Rec) home with assist;home with outpatient cardiac rehab   OT Rationale for DC Rec pt. has good support, indep. at baseline, presents wiht general weakness/deconditioning   OT Brief overview of current status CGA with BADls and functional mobility, amb. with iv pole   Total Evaluation Time (Minutes)   Total Evaluation Time (Minutes) 10   OT Goals   Therapy Frequency (OT) Daily   OT Predicated Duration/Target Date for Goal Attainment 03/30/22   OT Goals Hygiene/Grooming;Upper Body Dressing;Lower Body Dressing;Aerobic Activity;OT Goal 1;Toilet Transfer/Toileting   OT: Hygiene/Grooming independent;modified independent;while standing   OT: Upper Body Dressing Independent;Modified independent;including set-up/clothing retrieval   OT: Lower Body Dressing  Independent;Modified independent;including set-up/clothing retrieval   OT: Toilet Transfer/Toileting Independent;Modified independent;toilet transfer;cleaning and garment management   OT: Perform aerobic activity with stable cardiovascular response 20 minutes;intermittent activity   OT: Goal 1 CGA with tub/shower transf. utilzing A.E./DME prn.

## 2022-03-23 NOTE — PROGRESS NOTES
Cardiology ICU Progress Note    Brief HPI:   72 year old male with a PMHx of CAD, s/p PCI/CHINA RCA 2016 (moderate 35-40% LAD & LCx dz), HTN, HLD, smoker 1.5 PPD.    Presented to ED on 3/23/2022 0245 with chest tightness. EKG inferior STEMI, admitted to CICU for coronary angiogram and PCI. The RCA has moderate diffuse disease, 95% lesion in the distal RCA and RPDA occlusion. Previously placed stent in the mid RCA is patent with mild in-stent restenosis.         The patient reported an episode of severe chest pressure that started about 45 minutes prior to arrival; reported being in bed-awake/watching a movie at the time of symptom onset.  He reported concomitant nausea, improvement in chest tightness after administration of sublingual NTG.  The patient reported similar symptoms to his cardiologist back in Nov 2021- Lexiscan done at the time negative for inducible ischemia.  Since then, he endorsed progressively worse symptoms-recurrent chest tightness with minimal exertion.  He reported decrease in exercise tolerance for the past few months, shortness of breath with exertion.  Denied PND/orthopnea/pedal edema.  Denied palpitations presyncope or syncope. CV risk factors: Hypertension, hyperlipidemia, active smoking-1.5 packs per day.     On presentation to the ED, patient hemodynamically stable BP 130s/70s, however became hypotensive to 80s/50s shortly thereafter.  Heart rate 74 bpm at the time of presentation, decreased to 40 bpm with NTG administration and frequent ectopics. EKG in the ER concerning for inferior STEMI. Emergent coronary angiogram revealed a 95% lesion in the distal RCA and RPDA occlusion. Previously placed stent in the mid RCA was patent with mild in-stent restenosis.  He underwent successful PCI of the distal RCA lesion using a 3.0 x 16 mm Synergy drug-eluting stent.  However, the clot in the RPDA was embolized further distally, into a very small and distal lumen of the vessel, therefore  conservative management of the latter was pursued. He was loaded with Brilinta 180 mg and initiated on Integrilin gtt, to be continued for 12 hours post procedure.  Intra-procedure, the patient also became hypotensive, requiring 2 doses of 100 mcg phenylephrine injections (neosticks) as well as initiation of Levophed gtt., which was quickly weaned off after transfer to the cardiac ICU.    Subjective and Interval: One short 7 beat run of V Tach over the night. Denies CP or SOB. Continues on integrelin.     Assessment: Inferior STEMI, preserved EF 55-60%  Plan:  GDMT  1. ASA 81 mg daily  2. Brilinta 90 mg BID  3. Statin   3. Start beta blocker: metoprolol 12.5 PO BID  4. Serial troponin until peaks     Neurology   # No acute issues    Plan: Continue to monitor neuro status      Cardiovascular  # CAD  # Preserved EF 55-60%  # Dyslipidemia  -- Telemetry monitoring  -- Troponin Q6 hrs until peak  TTE: EF 55-60%  EKG: Sinus rhythm, inferior infarct evolving    Current Pressors/inotropes/antiarrythmic: no gtts    Plan:  - Status post loading with  mg & Brilinta 180 mg in the ER.   - Patient to remain on Integrilin gtt for 12 hours post PCI.  - Brilinta 90 mg BID this evening; and ASA 81 mg daily.  - Will need DAPT for atleast one year.   - Lipitor 80 mg daily.  Check lipid panel.  - Check TTE coming AM.  - Start B-blockers/ACEI once more consistently hemodynamically stable.      Imaging/procedures:   Coronary findings:      Mid LM lesion is 30% stenosed.      Prox LAD lesion is 20% stenosed.   Mid LAD lesion is 20% stenosed.   y   Mid RCA lesion is 20% stenosed. The lesion was previously treated using a stent of unknown type.   Dist RCA lesion is 99% stenosed.   Right Posterior Descending Artery   RPDA lesion is 100% stenosed.     Coronary angiogram: Emergent coronary angiogram revealed a 95% lesion in the distal RCA and RPDA occlusion. Previously placed stent in the mid RCA was patent with mild in-stent restenosis.   He underwent successful PCI of the distal RCA lesion using a 3.0 x 16 mm Synergy drug-eluting stent.  However, the clot in the RPDA was embolized further distally, into a very small and distal lumen of the vessel, therefore conservative management of the latter was pursued.      Pulmonary  - No active concerns, continue to monitor respiratory status .     Plan:  1.Wean NC O2      Infectious Disease  WBC 8.2    Plan:   - No active concerns.  - Monitor for signs of infection     Renal, Electrolytes  # Monitor for Acute Renal Injury  # Hypoalbuminemia   -- Cr 0.94, UOP since admission 100 ml-urinal,   -- Monitor urine output     Plan:   -  Strict I/O's  - Maintain K>4 and Mg>2.      Gastrointestinal, Nutrition  No active concern  No known medical hx.   -- Monitor LFTs  -- Cardiac diet  -- GI Prophylaxis  AST 42  ALT 26    Plan:   -Cardiac diet  -Blood glucose Q6 hours    Hematology  # No acute issues  Hgb 14.7  stable. No e/o bleeding.   -- ASA/ticagrelor for CHINA  -- Transfuse for Hgb < 8    Plan  -Monitor for signs of bleeding  -ASA, ticagrelor       Endocrinology  # Hyperglycemia   # Screening for T2DM  HbA1c 5.6    Plan:   - Check HGB A1c , q6 glucose checks.    Integumentary:  - No skin issues    -Lines:   PIVs.  Current lines are required for patient management      Family updated by me    Patient seen and discussed with staff physician.    Time Spent on this Encounter   Martell was seen and evaluated by me on 03/23/22.  He was in critical condition as the result of inferior MI.    His condition is now Critical.      The acute issues managed by me today include  GDMT     Total Critical Care time spent by me, excluding procedures, was 60 minutes.    Apurva Ortega, APRN CNP     ROSE Rowe, CNP, CCRN  [facility]  Cardiology ICU    Objective:  Most recent vital signs:  /76   Pulse 85   Temp 97.6  F (36.4  C) (Oral)   Resp 13   Wt 87 kg (191 lb 12.8 oz)   SpO2 97%    BMI 30.04 kg/m    Temp:  [97.4  F (36.3  C)-97.6  F (36.4  C)] 97.6  F (36.4  C)  Pulse:  [66-85] 85  Resp:  [12-20] 13  BP: ()/(52-92) 127/76  Cuff Mean (mmHg):  [] 81  SpO2:  [89 %-98 %] 97 %  Wt Readings from Last 2 Encounters:   03/23/22 87 kg (191 lb 12.8 oz)   11/19/21 88.7 kg (195 lb 9.6 oz)       Intake/Output Summary (Last 24 hours) at 3/23/2022 0815  Last data filed at 3/23/2022 0600  Gross per 24 hour   Intake 50 ml   Output --   Net 50 ml     Physical exam:  General: In bed, in NAD  HEENT: PERRL, no scleral icterus or injection  CARDIAC: RRR, Heart sounds distant, no m/r/g appreciated. Peripheral pulses palpable  RESP: 2 L NC; CTAB, no wheezes, rhonchi or crackles appreciated.  GI: soft, BS hypoactive  : Urinal  EXTREMITIES: LE edema none, pulses 2+. R Femoral access site w/o bleeding, dressing c/d/i. No bruits appreciated.   SKIN: No acute lesions appreciated  NEURO: Alert and oriented, involved in care plan      Access: PIV    Labs (Past three days):  CBC  Recent Labs   Lab 03/23/22  0651 03/23/22  0255   WBC 8.0 8.2   RBC 4.96 5.57   HGB 14.7 16.6   HCT 43.9 48.1   MCV 89 86   MCH 29.6 29.8   MCHC 33.5 34.5   RDW 13.4 13.4    132*     BMP  Recent Labs   Lab 03/23/22  0651 03/23/22  0255    138   POTASSIUM 3.3* 4.1   CHLORIDE 107 105   CO2 27 28   ANIONGAP 4 5   * 128*   BUN 17 17   CR 0.90 0.94   GFRESTIMATED 90 85   TIMOTHY 8.7 9.2   MAG 2.0  --    PHOS 2.4*  --      Troponins:  3/23 0300: 12  3/23       INR  Recent Labs   Lab 03/23/22  0255   INR 1.02     Liver panel  Recent Labs   Lab 03/23/22  0651 03/23/22  0255   PROTTOTAL 5.9* 7.3   ALBUMIN 2.9* 3.6   BILITOTAL 0.5 0.6   ALKPHOS 78 92   AST 42 33   ALT 26 28       Imaging/procedure results:  Cardiac Catheterization    Inferior STEMI in the setting of RPDA occlusion. There was also a 95%   lesion in the distal RCA. Previously placed stent in the mid RCA is patent   with mild in-stent restenosis. The RCA has moderate  diffuse disease.    Status post successful PCI of the distal RCA lesion using a 3.0 x 16 mm   Synergy drug-eluting stent.    With wiring of the lesion, the clot in the RPDA was embolized further   distally. Due to the lesion in the RPDA being very distal and this being a   very small vessel at this point, it could not be stented and we elected to   proceed with conservative management.    Nonobstructive coronary atherosclerosis in the left coronary system.      3/23   TTE: Left Ventricle: Global and regional left ventricular function is normal with an EF of 55-60%. Left ventricular wall thickness is normal. Left ventricular size is normal.  Right Ventricle: Global right ventricular function is normal. The right ventricle is normal  Size.  Mitral Valve: The mitral valve is normal. Trace mitral insufficiency is present.   Aortic Valve: The aortic valve is tricuspid. On Doppler interrogation, there is no  significant stenosis or regurgitation.   Tricuspid Valve: The tricuspid valve is normal. Trace tricuspid insufficiency is present.  Pulmonary artery systolic pressure cannot be assessed.   Pulmonic Valve:The valve leaflets are not well visualized. Trace pulmonic insufficiency is  present  Pericardium: No pericardial effusion is present.   Hedy Ortega, APRN, AANP-C, CCRN  Critical Care Cardiology

## 2022-03-23 NOTE — PROGRESS NOTES
Admitted/transferred from: Arrived on 4A from cath lab at 0420    Reason for admission/transfer:   Post angiogram monitoring.    2 RN skin assessment:   completed by Lainey Dolan and Circ RN (Ariella).    Result of skin assessment and interventions/actions:  see flowsheet for skin assessment.    Weight, drug calc weight: done    12 lead EKG was done upon admission to  and [facility] night shift resident notified that 12 lead EKG was completed.    Patient belongings:  Patient's wife took patient's wallet and medication home.  Patient's cell phone, one white plastic bag, and shoes at bedside.    MDRO education added to care plan: Yes    Per Cath Lab RN report brilinta 180mg was given orally in cath lab.    Patient's wife and son visited with patient and then went home.      Per patient's wife patient has early onset alzheimer's disease, patient intermittently gets confused/delirious, and patient will also repeat himself.  ?    **At change of shift patient went into a long run of VTACH and then converted to NSR (see smart disclosure), at that time patient denied chest pain, denied SOB, denied nausea, defib pads were placed at bedside. [facility] day time resient ([id number]) was called and notified of the above, discussed labs were just collected at about 0650.  Potassium was ordered and to be replaced by day shift RN.

## 2022-03-23 NOTE — ED PROVIDER NOTES
"  History     Chief Complaint   Patient presents with     Chest Pain     HPI  Martell Patel is a 72 year old male with PMH notable for ACS s/p PCA to RCA in 2016 who presents to the ED with chest pain. Pain started 1 hour prior to arrival. Left anterior chest. Constant. Started at rest. Improved with nitroglycerin from EMS. ASA given by EMS. EMS reports ECG abnormalities, are unsure if STEMI. Patient feeling otherwise well recently.     Past Medical History  Past Medical History:   Diagnosis Date     Arthritis      Ataxia      Chest pain      CIDP (chronic inflammatory demyelinating polyneuropathy) (H)     Remnant is that his sense of balance is impaired, particularly at night.     Coronary artery disease      HLD (hyperlipidemia)      HTN (hypertension)      Hyperlipidemia      Hypertension      Hypertension      Sleep apnea      Past Surgical History:   Procedure Laterality Date     APPENDECTOMY       APPENDECTOMY       CARDIAC CATHETERIZATION  12/06/2016     CARDIAC CATHETERIZATION N/A 12/6/2016    Procedure: Coronary Angiogram;  Surgeon: Duane Gandhi MD;  Location: [facility] Cath Lab;  Service:      CORONARY STENT PLACEMENT  12/06/2016    [place] to RCA.     ENT SURGERY      tonsilectomy     ORTHOPEDIC SURGERY       REVISION AMPUTATION OF FINGER      Top of right pointer finger - cut on      THROAT SURGERY      \"To remove some polyps in my throat.  No, they weren't cancer.\"     TONSILLECTOMY       US BIOPSY FINE NEEDLE ASPIRATION LYMPH NODE (BREAST) LEFT Left 7/6/2021     [START ON 3/24/2022] aspirin (ASA) 81 MG EC tablet  atorvastatin (LIPITOR) 80 MG tablet      No Known Allergies  Social History   Social History     Tobacco Use     Smoking status: Current Every Day Smoker     Packs/day: 1.50     Years: 50.00     Pack years: 75.00     Smokeless tobacco: Never Used   Substance Use Topics     Alcohol use: No     Comment: Alcoholic Drinks/day: No alcohol since 1978     Drug use: No      Past " medical history and social history were reviewed with the patient. Additional pertinent items: None     Review of Systems  A complete review of systems was performed with pertinent positives and negatives noted in the HPI, and all other systems negative.    Physical Exam   BP: 135/77  Pulse: 74  Temp: 97.4  F (36.3  C)  Resp: 20  Weight: 86.2 kg (190 lb)  SpO2: 97 %    Physical Exam  General: somewhat uncomfortable appearing. Appears stated age.   HENT: MMM, no oropharyngeal lesions  Eyes: PERRL, normal sclerae  Cardio: regular rate. Regular rhythm. Extremities well perfused  Resp: Normal work of breathing, normal respiratory rate.  Abdomen: no tenderness, non-distended, no rebound, no guarding  Neuro: alert and fully oriented. CN II-XII grossly intact. Grossly normal strength and sensation in all extremities.   MSK: no deformities. Grossly normal ROM in extremities.   Integumentary/Skin: no rash visualized, normal color  Psych: normal affect, normal behavior    ED Course      Procedures        Critical Care Addendum    My initial assessment, based on my review of prehospital provider report and 12 lead ECG analysis, established that Martell Patel has an ST elevation myocardial infarction, which requires immediate intervention, and therefore he is critically ill.     After the initial assessment, the care team initiated multiple lab tests, initiated medication therapy with nitroglycerin and consulted with cardiology to provide stabilization care. Due to the critical nature of this patient, I reassessed nursing observations, vital signs, physical exam, review of cardiac rhythm monitor, 12 lead ECG analysis and interpretation of laboratory studies multiple times prior to his disposition.     Time also spent performing documentation, reviewing test results, discussion with consultants and coordination of care.     Critical care time (excluding teaching time and procedures): 40 minutes.          EKG Interpretation:       Interpreted by Wes Barber MD  Time reviewed: 0253  Symptoms at time of EKG: chest pain   Rhythm: normal sinus   Rate: Normal  Axis: Normal  Ectopy: none  Conduction: normal  ST Segments/ T Waves: II, III, aVF ST elevation, aVL, V1, V2 ST depression  Q Waves: III  Comparison to prior: New ST changes compared to 12/6/2016    Clinical Impression: inferior STEMI               The patient has a STEMI     The patient was evaluated at 0246   Cath lab transfer at 0330 for cardiac intervention   ASA given by medics or taken today                     Labs Ordered and Resulted from Time of ED Arrival to Time of ED Departure   PARTIAL THROMBOPLASTIN TIME - Abnormal       Result Value    aPTT 40 (*)    BASIC METABOLIC PANEL - Abnormal    Sodium 138      Potassium 4.1      Chloride 105      Carbon Dioxide (CO2) 28      Anion Gap 5      Urea Nitrogen 17      Creatinine 0.94      Calcium 9.2      Glucose 128 (*)     GFR Estimate 85     CBC WITH PLATELETS AND DIFFERENTIAL - Abnormal    WBC Count 8.2      RBC Count 5.57      Hemoglobin 16.6      Hematocrit 48.1      MCV 86      MCH 29.8      MCHC 34.5      RDW 13.4      Platelet Count 132 (*)     % Neutrophils 58      % Lymphocytes 30      % Monocytes 7      % Eosinophils 3      % Basophils 2      % Immature Granulocytes 0      NRBCs per 100 WBC 0      Absolute Neutrophils 4.8      Absolute Lymphocytes 2.4      Absolute Monocytes 0.6      Absolute Eosinophils 0.3      Absolute Basophils 0.1      Absolute Immature Granulocytes 0.0      Absolute NRBCs 0.0     INR - Normal    INR 1.02     ISTAT TROPONIN POCT - Normal    TROPPC POCT 0.00       Cardiac Catheterization         Echo Complete    (Results Pending)          Assessments & Plan (with Medical Decision Making)   Patient presenting with chest pain. Vitals in the ED unremarkable. Nursing notes reviewed.     Prehospital ECG reviewed immediately upon patient arrival - has inferior ST elevations with reciprical aVL  depression and T inversion. Cath lab activated.     ED ECG also c/w inferior STEMI. Ticagrelor ordered. Nitroglycerin given. Patient received ASA from EMS. Initial high sensitivity troponin 12.     The complete clinical picture is most consistent with inferior STEMI. After counseling on the diagnosis, work-up, and treatment plan, the patient was brought to the cath lab.       Final diagnoses:   ST elevation myocardial infarction (STEMI), unspecified artery (H)   Chest pain in adult         --  Wes Barber MD   Emergency Medicine   [facility] EMERGENCY DEPARTMENT  3/23/2022     Wes Barber MD  03/23/22 0857       Wes Barber MD  03/23/22 0962

## 2022-03-23 NOTE — Clinical Note
Stent deployed in the distal right coronary artery. Max pressure = 16 jeane. Total duration = 10 seconds.

## 2022-03-23 NOTE — PLAN OF CARE
Major Shift Events: Neurologically intact, equal strength in all extremities, denies pain and numbness/tingling. Patient often needs redirection that he is attached to many leads/wires and needs to call for help with ambulation, patient often attempts to ambulate from bed to chair by himself, writer reinforced need to call for help with high fall risk and on high risk medication. Sinus rhythm to sinus carmen, lowest seen was 54 bpm. Rare PVC's. Short run of Vtach with a pulses this AM, team aware and acknowledged this is an expected occurrence, K replaced, Phos replaced. Palpable pulses throughout, warm extremities. Afebrile. Weaned to room air, tolerating well, patient has a dry non productive cough (states baseline), and often can have an increased work of breathing with ADL's (patient does not say this is abnormal), lung sounds clear. Tolerating sodium restricted diet. Voiding with assist of 1 as a contact guard in a urinal at the bedside. No bm this shift, commode ordered for need. Procedural access site is unchanged, no signs of increased bleeding or hematoma. Integrilin infusing at 2 mg/kg/min, to be stopped at 1919. Worked with therapy and spoke with smoking cessation representative today.    Plan: Transfer orders placed to , awaiting bed availability. Finish 12 hour treatment of Integrilin at 1919.   For vital signs and complete assessments, please see documentation flowsheets.

## 2022-03-24 ENCOUNTER — APPOINTMENT (OUTPATIENT)
Dept: OCCUPATIONAL THERAPY | Facility: CLINIC | Age: 72
DRG: 247 | End: 2022-03-24
Payer: COMMERCIAL

## 2022-03-24 ENCOUNTER — TELEPHONE (OUTPATIENT)
Dept: CARDIOLOGY | Facility: CLINIC | Age: 72
End: 2022-03-24
Payer: COMMERCIAL

## 2022-03-24 DIAGNOSIS — I25.110 CORONARY ARTERY DISEASE INVOLVING NATIVE CORONARY ARTERY OF NATIVE HEART WITH UNSTABLE ANGINA PECTORIS (H): ICD-10-CM

## 2022-03-24 DIAGNOSIS — Z95.5 S/P DRUG ELUTING CORONARY STENT PLACEMENT: ICD-10-CM

## 2022-03-24 DIAGNOSIS — Z95.5 HISTORY OF HEART ARTERY STENT: ICD-10-CM

## 2022-03-24 DIAGNOSIS — I21.3 STEMI (ST ELEVATION MYOCARDIAL INFARCTION) (H): Primary | ICD-10-CM

## 2022-03-24 LAB
ALBUMIN SERPL-MCNC: 2.9 G/DL (ref 3.4–5)
ALP SERPL-CCNC: 80 U/L (ref 40–150)
ALT SERPL W P-5'-P-CCNC: 29 U/L (ref 0–70)
ANION GAP SERPL CALCULATED.3IONS-SCNC: 7 MMOL/L (ref 3–14)
AST SERPL W P-5'-P-CCNC: 69 U/L (ref 0–45)
ATRIAL RATE - MUSE: 63 BPM
ATRIAL RATE - MUSE: 66 BPM
BASOPHILS # BLD AUTO: 0.1 10E3/UL (ref 0–0.2)
BASOPHILS NFR BLD AUTO: 1 %
BILIRUB SERPL-MCNC: 0.6 MG/DL (ref 0.2–1.3)
BUN SERPL-MCNC: 16 MG/DL (ref 7–30)
CALCIUM SERPL-MCNC: 8.6 MG/DL (ref 8.5–10.1)
CHLORIDE BLD-SCNC: 109 MMOL/L (ref 94–109)
CO2 SERPL-SCNC: 25 MMOL/L (ref 20–32)
CREAT SERPL-MCNC: 0.89 MG/DL (ref 0.66–1.25)
DIASTOLIC BLOOD PRESSURE - MUSE: NORMAL MMHG
DIASTOLIC BLOOD PRESSURE - MUSE: NORMAL MMHG
EOSINOPHIL # BLD AUTO: 0.1 10E3/UL (ref 0–0.7)
EOSINOPHIL NFR BLD AUTO: 2 %
ERYTHROCYTE [DISTWIDTH] IN BLOOD BY AUTOMATED COUNT: 13.4 % (ref 10–15)
GFR SERPL CREATININE-BSD FRML MDRD: 90 ML/MIN/1.73M2
GLUCOSE BLD-MCNC: 94 MG/DL (ref 70–99)
HCT VFR BLD AUTO: 43.2 % (ref 40–53)
HGB BLD-MCNC: 14.7 G/DL (ref 13.3–17.7)
IMM GRANULOCYTES # BLD: 0 10E3/UL
IMM GRANULOCYTES NFR BLD: 0 %
INTERPRETATION ECG - MUSE: NORMAL
INTERPRETATION ECG - MUSE: NORMAL
LYMPHOCYTES # BLD AUTO: 2 10E3/UL (ref 0.8–5.3)
LYMPHOCYTES NFR BLD AUTO: 29 %
MAGNESIUM SERPL-MCNC: 1.9 MG/DL (ref 1.6–2.3)
MCH RBC QN AUTO: 29.5 PG (ref 26.5–33)
MCHC RBC AUTO-ENTMCNC: 34 G/DL (ref 31.5–36.5)
MCV RBC AUTO: 87 FL (ref 78–100)
MONOCYTES # BLD AUTO: 0.6 10E3/UL (ref 0–1.3)
MONOCYTES NFR BLD AUTO: 8 %
NEUTROPHILS # BLD AUTO: 4.1 10E3/UL (ref 1.6–8.3)
NEUTROPHILS NFR BLD AUTO: 60 %
NRBC # BLD AUTO: 0 10E3/UL
NRBC BLD AUTO-RTO: 0 /100
P AXIS - MUSE: 72 DEGREES
P AXIS - MUSE: 78 DEGREES
PHOSPHATE SERPL-MCNC: 2.7 MG/DL (ref 2.5–4.5)
PLATELET # BLD AUTO: 142 10E3/UL (ref 150–450)
POTASSIUM BLD-SCNC: 3.3 MMOL/L (ref 3.4–5.3)
POTASSIUM BLD-SCNC: 3.5 MMOL/L (ref 3.4–5.3)
PR INTERVAL - MUSE: 164 MS
PR INTERVAL - MUSE: 174 MS
PROT SERPL-MCNC: 5.9 G/DL (ref 6.8–8.8)
QRS DURATION - MUSE: 82 MS
QRS DURATION - MUSE: 84 MS
QT - MUSE: 422 MS
QT - MUSE: 428 MS
QTC - MUSE: 437 MS
QTC - MUSE: 442 MS
R AXIS - MUSE: -38 DEGREES
R AXIS - MUSE: -49 DEGREES
RBC # BLD AUTO: 4.99 10E6/UL (ref 4.4–5.9)
SODIUM SERPL-SCNC: 141 MMOL/L (ref 133–144)
SYSTOLIC BLOOD PRESSURE - MUSE: NORMAL MMHG
SYSTOLIC BLOOD PRESSURE - MUSE: NORMAL MMHG
T AXIS - MUSE: -44 DEGREES
T AXIS - MUSE: -7 DEGREES
TROPONIN I SERPL HS-MCNC: 8560 NG/L
TROPONIN I SERPL HS-MCNC: ABNORMAL NG/L
TROPONIN I SERPL HS-MCNC: ABNORMAL NG/L
VENTRICULAR RATE- MUSE: 63 BPM
VENTRICULAR RATE- MUSE: 66 BPM
WBC # BLD AUTO: 6.9 10E3/UL (ref 4–11)

## 2022-03-24 PROCEDURE — 84484 ASSAY OF TROPONIN QUANT: CPT

## 2022-03-24 PROCEDURE — 85025 COMPLETE CBC W/AUTO DIFF WBC: CPT | Performed by: INTERNAL MEDICINE

## 2022-03-24 PROCEDURE — 83695 ASSAY OF LIPOPROTEIN(A): CPT | Performed by: NURSE PRACTITIONER

## 2022-03-24 PROCEDURE — 97530 THERAPEUTIC ACTIVITIES: CPT | Mod: GO

## 2022-03-24 PROCEDURE — 214N000001 HC R&B CCU UMMC

## 2022-03-24 PROCEDURE — 250N000013 HC RX MED GY IP 250 OP 250 PS 637: Performed by: INTERNAL MEDICINE

## 2022-03-24 PROCEDURE — 84100 ASSAY OF PHOSPHORUS: CPT | Performed by: INTERNAL MEDICINE

## 2022-03-24 PROCEDURE — 36415 COLL VENOUS BLD VENIPUNCTURE: CPT | Performed by: NURSE PRACTITIONER

## 2022-03-24 PROCEDURE — 99291 CRITICAL CARE FIRST HOUR: CPT

## 2022-03-24 PROCEDURE — 250N000013 HC RX MED GY IP 250 OP 250 PS 637: Performed by: STUDENT IN AN ORGANIZED HEALTH CARE EDUCATION/TRAINING PROGRAM

## 2022-03-24 PROCEDURE — 250N000013 HC RX MED GY IP 250 OP 250 PS 637

## 2022-03-24 PROCEDURE — 36415 COLL VENOUS BLD VENIPUNCTURE: CPT | Performed by: INTERNAL MEDICINE

## 2022-03-24 PROCEDURE — 36415 COLL VENOUS BLD VENIPUNCTURE: CPT

## 2022-03-24 PROCEDURE — 83735 ASSAY OF MAGNESIUM: CPT | Performed by: INTERNAL MEDICINE

## 2022-03-24 PROCEDURE — 84132 ASSAY OF SERUM POTASSIUM: CPT | Performed by: INTERNAL MEDICINE

## 2022-03-24 PROCEDURE — 97129 THER IVNTJ 1ST 15 MIN: CPT | Mod: GO

## 2022-03-24 PROCEDURE — 80053 COMPREHEN METABOLIC PANEL: CPT | Performed by: INTERNAL MEDICINE

## 2022-03-24 RX ORDER — LISINOPRIL 10 MG/1
10 TABLET ORAL DAILY
Status: DISCONTINUED | OUTPATIENT
Start: 2022-03-24 | End: 2022-03-25 | Stop reason: HOSPADM

## 2022-03-24 RX ORDER — POTASSIUM CHLORIDE 750 MG/1
20 TABLET, EXTENDED RELEASE ORAL ONCE
Status: COMPLETED | OUTPATIENT
Start: 2022-03-24 | End: 2022-03-24

## 2022-03-24 RX ORDER — POTASSIUM CHLORIDE 750 MG/1
40 TABLET, EXTENDED RELEASE ORAL ONCE
Status: COMPLETED | OUTPATIENT
Start: 2022-03-24 | End: 2022-03-24

## 2022-03-24 RX ORDER — MAGNESIUM OXIDE 400 MG/1
400 TABLET ORAL 2 TIMES DAILY
Status: DISCONTINUED | OUTPATIENT
Start: 2022-03-24 | End: 2022-03-25 | Stop reason: HOSPADM

## 2022-03-24 RX ADMIN — Medication 5 MG: at 02:00

## 2022-03-24 RX ADMIN — Medication 5 MG: at 22:27

## 2022-03-24 RX ADMIN — Medication 12.5 MG: at 07:49

## 2022-03-24 RX ADMIN — Medication 400 MG: at 20:26

## 2022-03-24 RX ADMIN — Medication 400 MG: at 07:49

## 2022-03-24 RX ADMIN — TICAGRELOR 90 MG: 90 TABLET ORAL at 20:27

## 2022-03-24 RX ADMIN — POTASSIUM & SODIUM PHOSPHATES POWDER PACK 280-160-250 MG 1 PACKET: 280-160-250 PACK at 07:49

## 2022-03-24 RX ADMIN — ASPIRIN 81 MG: 81 TABLET, COATED ORAL at 07:49

## 2022-03-24 RX ADMIN — TICAGRELOR 90 MG: 90 TABLET ORAL at 07:49

## 2022-03-24 RX ADMIN — Medication 12.5 MG: at 20:26

## 2022-03-24 RX ADMIN — POTASSIUM CHLORIDE 40 MEQ: 750 TABLET, EXTENDED RELEASE ORAL at 06:14

## 2022-03-24 RX ADMIN — POTASSIUM CHLORIDE 20 MEQ: 750 TABLET, EXTENDED RELEASE ORAL at 12:13

## 2022-03-24 RX ADMIN — LISINOPRIL 10 MG: 10 TABLET ORAL at 12:14

## 2022-03-24 RX ADMIN — ATORVASTATIN CALCIUM 80 MG: 80 TABLET, FILM COATED ORAL at 07:49

## 2022-03-24 RX ADMIN — POTASSIUM & SODIUM PHOSPHATES POWDER PACK 280-160-250 MG 1 PACKET: 280-160-250 PACK at 20:26

## 2022-03-24 ASSESSMENT — ACTIVITIES OF DAILY LIVING (ADL)
ADLS_ACUITY_SCORE: 9

## 2022-03-24 NOTE — TELEPHONE ENCOUNTER
[facility] Call Center    Phone Message    May a detailed message be left on voicemail: yes     Reason for Call: Other: Sarah called in stating that pt is in the hospital at the  after a heart attack and collasped stent. She wanted to schedule a f/u for next week but Dr. Solis is booked until May at [place]. Please call Sarah back to discuss     Action Taken: Message routed to:  Other: [facility]    Travel Screening: Not Applicable

## 2022-03-24 NOTE — PROGRESS NOTES
Research Consent Note:    Post Acute Cardiac Event Smoking(PACES) Study      PI:  Sherwin Dodson MD pager:  [phone]  :  Augusta Ulrich -[phone]    Mini Mental Exam not applicable.  Tex Santanajens completed the consenting process.  The consent form was reviewed with the patient. The purpose of the study, risks and benefits of study participation were discussed as well as study requirements for participation. Confidentiality issues and release of Personal Health Information were reviewed. It was discussed that study participation is voluntary and that refusal to participate will involve no penalty or decrease benefits to which the subject is otherwise entitled, and the subject may discontinue participation at any time without penalty or loss of benefits. Subject verbalized understanding and was able to state what study participation involved. Patient signed the consent form prior to study participation and was given a copy of the consent form.  No study procedures were done prior to subject signing consent.    Subject obtained a consent quiz score of 5 (out of 5 correct answers) after 1  time administered.  The writer and subject were present during the consent process.  Subject had the following questions regarding the study:  None    Discussed option of obtaining provider medical clearance/signature for use of the nicotine patch and patient agreed                                                 .      Signed Release of Information obtained from Subject per study procedures.    [facility] Counselor notified of consented Subject and plans to see Subject 3/24/2022    Subject and applicable hospital staff were made aware of when [facility] Counselors will meet with patient.

## 2022-03-24 NOTE — PROGRESS NOTES
Shift: 1500 - 1930 patient arrived on unit at ~1500 from 4A    72 year old male with a PMHx of CAD, s/p PCI/[place] RCA 2016 (moderate 35-40% LAD & LCx dz), HTN, HLD, smoker 1.5 PPD.  Presented to ED on 3/23/2022 0245 with chest tightness. EKG inferior STEMI, admitted to CICU for coronary angiogram and PCI. The RCA has moderate diffuse disease, 95% lesion in the distal RCA and RPDA occlusion. Previously placed stent in the mid RCA is patent with mild in-stent restenosis.      VS: Temp: 97.8  F (36.6  C) Temp src: Oral BP: (!) 144/85 Pulse: 70   Resp: 18 SpO2: 95 % O2 Device: None (Room air)    Pain: denies pain. No S/S of pain.   Neuro: oriented to place & time. Early stages of Alzheimer's, forgetful. Impulsive. Bed/chair alarm in use. Able to follow direction but forgets, not call light appropriate.   Cardiac:   SR.   Respiratory: Lung sounds clear on RA.   GI/Diet/Appetite: Low sat fat diet with <2400mg Na. Good appetite. LBM 3/24.  :  Voiding spontaneously.   LDA's: PIV to L&R, SL.   Skin: Intact.   Activity: Ax1  Tests/Procedures:   Pertinent Labs/Lab Collection: 4A RN reports replacement of K+, Mg, and phosphorus while on 4A.     Plan: Possible discharge 3/25. Sitter requested. Contact [facility] with change in condition.

## 2022-03-24 NOTE — PLAN OF CARE
Report called and patient transferred to . Vital signs stable and neuro exam consistent (intermittent confusion and STML). Belongings sent with patient, including: eyeglasses, dentures, clothing, toiletry kit, etc. Attempted to reach wife to update her regarding transfer but she did not answer, will attempt again shortly.

## 2022-03-24 NOTE — PROGRESS NOTES
Cardiology ICU Progress Note    Brief HPI:   72 year old male with a PMHx of CAD, s/p PCI/CHINA RCA 2016 (moderate 35-40% LAD & LCx dz), HTN, HLD, smoker 1.5 PPD.    Presented to ED on 3/23/2022 0245 with chest tightness. EKG inferior STEMI, admitted to CICU for coronary angiogram and PCI. The RCA has moderate diffuse disease, 95% lesion in the distal RCA and RPDA occlusion. Previously placed stent in the mid RCA is patent with mild in-stent restenosis.         The patient reported an episode of severe chest pressure that started about 45 minutes prior to arrival; reported being in bed-awake/watching a movie at the time of symptom onset.  He reported concomitant nausea, improvement in chest tightness after administration of sublingual NTG.  The patient reported similar symptoms to his cardiologist back in Nov 2021- Lexiscan done at the time negative for inducible ischemia.  Since then, he endorsed progressively worse symptoms-recurrent chest tightness with minimal exertion.  He reported decrease in exercise tolerance for the past few months, shortness of breath with exertion.  Denied PND/orthopnea/pedal edema.  Denied palpitations presyncope or syncope. CV risk factors: Hypertension, hyperlipidemia, active smoking-1.5 packs per day.     On presentation to the ED, patient hemodynamically stable BP 130s/70s, however became hypotensive to 80s/50s shortly thereafter.  Heart rate 74 bpm at the time of presentation, decreased to 40 bpm with NTG administration and frequent ectopics. EKG in the ER concerning for inferior STEMI. Emergent coronary angiogram revealed a 95% lesion in the distal RCA and RPDA occlusion. Previously placed stent in the mid RCA was patent with mild in-stent restenosis.  He underwent successful PCI of the distal RCA lesion using a 3.0 x 16 mm Synergy drug-eluting stent.  However, the clot in the RPDA was embolized further distally, into a very small and distal lumen of the vessel, therefore  conservative management of the latter was pursued. He was loaded with Brilinta 180 mg and initiated on Integrilin gtt, to be continued for 12 hours post procedure.  Intra-procedure, the patient also became hypotensive, requiring 2 doses of 100 mcg phenylephrine injections (neosticks) as well as initiation of Levophed gtt., which was quickly weaned off after transfer to the cardiac ICU.    Subjective and Interval: Confused and impulsive over the night, clearing this morning     Assessment: Inferior STEMI, preserved EF 55-60%+, waiting for bed on floor    Plan:  1.Lisinopril 20 mg every day  2. Possible discharge home with wife after social work visit  3. Close discharge follow up for medication management       Neurology   # Confusion  Pt's SLUMS score indicates cognitive deficits in the areas of executive functioning & short term memory recall. OT spoke with pt's wife and recommending 24 hour supervision for safety upon discharge to home. Per pt's wife, has an appointment with neurology to assess cognitive function.  notified and will talk to wife before discharge      Cardiovascular  # CAD  # Preserved EF 55-60%  # Dyslipidemia  -- Telemetry monitoring  -- Troponin peak 79173, trending down 16079  TTE: EF 55-60%  EKG: Sinus rhythm, inferior infarct evolving  Started metoprolol yesterday, HR 50-70, MAP . Will keep metoptolol at 12.5 mg BID and start lisinopril today for better blood pressure control.    GDMT  1. ASA 81 mg daily  2. Brilinta 90 mg BID  3. Statin   3. metoprolol 12.5 PO BID  4. Lisinopril 20 mg QD        Imaging/procedures:   Coronary findings:      Mid LM lesion is 30% stenosed.      Prox LAD lesion is 20% stenosed.   Mid LAD lesion is 20% stenosed.   y   Mid RCA lesion is 20% stenosed. The lesion was previously treated using a stent of unknown type.   Dist RCA lesion is 99% stenosed.   Right Posterior Descending Artery   RPDA lesion is 100% stenosed.     Coronary angiogram:  Emergent coronary angiogram revealed a 95% lesion in the distal RCA and RPDA occlusion. Previously placed stent in the mid RCA was patent with mild in-stent restenosis.  He underwent successful PCI of the distal RCA lesion using a 3.0 x 16 mm Synergy drug-eluting stent.  However, the clot in the RPDA was embolized further distally, into a very small and distal lumen of the vessel, therefore conservative management of the latter was pursued.      Pulmonary  #Smoking Cessation  Not interested in quiting  - No active concerns      Infectious Disease  WBC 8.2, afebrile  No active concerns at this time      Renal, Electrolytes  # Monitor for Acute Renal Injury  # Hypoalbuminemia   No active renal concerns at this time     Gastrointestinal, Nutrition  No active concern  No known medical hx.   -- Monitor LFTs  -- Cardiac diet  -- GI Prophylaxis  AST 42  ALT 26    Plan:   -Cardiac diet      Hematology  No acute issues  Hgb 14.7  stable. No e/o bleeding.   -- ASA/ticagrelor for CHINA  -- Transfuse for Hgb < 8    Plan  -Monitor for signs of bleeding  -ASA, ticagrelor       Endocrinology  # Hyperglycemia   # Screening for T2DM  HbA1c 5.6  No active concerns      Integumentary:  - No skin issues    -Lines:   PIVs.  Current lines are required for patient management      Family updated by me    Patient seen and discussed with staff physician.    Time Spent on this Encounter   Martell was seen and evaluated by me on 03/23/22.  He was in critical condition as the result of inferior MI.    His condition is now Critical.      The acute issues managed by me today include  GDMT     Total Critical Care time spent by me, excluding procedures, was 60 minutes.    Apurva Ortega, ROSE CNP     ROSE Rowe, CNP, CCRN  [facility] Cardiology  Cardiology ICU    Objective:  Most recent vital signs:  /65   Pulse 64   Temp 97.8  F (36.6  C) (Oral)   Resp 21   Wt 77.9 kg (171 lb 11.8 oz)   SpO2 95%   BMI 26.90  kg/m    Temp:  [97.8  F (36.6  C)-98.3  F (36.8  C)] 97.8  F (36.6  C)  Pulse:  [58-73] 64  Resp:  [11-23] 21  BP: (102-137)/(60-88) 118/65  SpO2:  [90 %-97 %] 95 %  Wt Readings from Last 2 Encounters:   03/24/22 77.9 kg (171 lb 11.8 oz)   11/19/21 88.7 kg (195 lb 9.6 oz)       Intake/Output Summary (Last 24 hours) at 3/23/2022 0815  Last data filed at 3/23/2022 0600  Gross per 24 hour   Intake 50 ml   Output --   Net 50 ml     Physical exam:  General: In bed, in NAD  HEENT: PERRL, no scleral icterus or injection  CARDIAC: RRR, Heart sounds distant, no m/r/g appreciated. Peripheral pulses palpable  RESP: RA; CTAB, no wheezes, rhonchi or crackles appreciated.  GI: soft, BS hypoactive, good appetite  : Urinal  EXTREMITIES: RLE trace edema, pulses 2+. R Femoral access site w/o bleeding, dressing c/d/i. No bruits appreciated.   SKIN: No acute lesions appreciated  NEURO: Alert and oriented to self and situation. States he is in a hospital in [facility]. [facility] with equal strength      Access: PIV    Labs (Past three days):  CBC  Recent Labs   Lab 03/24/22  0457 03/23/22  0651 03/23/22  0255   WBC 6.9 8.0 8.2   RBC 4.99 4.96 5.57   HGB 14.7 14.7 16.6   HCT 43.2 43.9 48.1   MCV 87 89 86   MCH 29.5 29.6 29.8   MCHC 34.0 33.5 34.5   RDW 13.4 13.4 13.4   * 150 132*     BMP  Recent Labs   Lab 03/24/22  0457 03/23/22  1206 03/23/22  0651 03/23/22  0255     --  138 138   POTASSIUM 3.3* 3.7 3.3* 4.1   CHLORIDE 109  --  107 105   CO2 25  --  27 28   ANIONGAP 7  --  4 5   GLC 94  --  116* 128*   BUN 16  --  17 17   CR 0.89  --  0.90 0.94   GFRESTIMATED 90  --  90 85   TIMOTHY 8.6  --  8.7 9.2   MAG 1.9 2.0 2.0  --    PHOS 2.7 3.3 2.4*  --      Troponins:  3/23 0300: 12  3/23       INR  Recent Labs   Lab 03/23/22  0255   INR 1.02     Liver panel  Recent Labs   Lab 03/24/22  0457 03/23/22  0651 03/23/22  0255   PROTTOTAL 5.9* 5.9* 7.3   ALBUMIN 2.9* 2.9* 3.6   BILITOTAL 0.6 0.5 0.6   ALKPHOS 80 78 92   AST 69* 42 33   ALT  29 26 28       Imaging/procedure results:  Echo Complete  [id number]  [id number]  [id number]  [id number]^SAMIR^JOVANNI     [facility],[place]  Echocardiography Laboratory  [address] [place] [id number]     Name: MARY HARLEY  MRN: [id number]  : 1950  Study Date: 2022 08:33 AM  Age: 72 yrs  Gender: Male  Patient Location: [facility]  Reason For Study: MI - Acute  Ordering Physician: JOVANNI DAWSON  Performed By: Argenis Francois     BSA: 2.0 m2  Height: 67 in  Weight: 190 lb  HR: 68  BP: 135/77 mmHg  ______________________________________________________________________________  Procedure  Complete Portable Echo Adult. Contrast Optison. Technically difficult study.  Poor acoustic windows. Optison (NDC #4024-2909-95) given intravenously.  Patient was given 6 ml mixture of 3 ml Optison and 6 ml saline. 3 ml wasted.  ______________________________________________________________________________  Interpretation Summary  Technically difficult study. Poor acoustic windows.  Global and regional left ventricular function is normal with an EF of 55-60%.  Global right ventricular function is normal. The right ventricle is normal  size.  No significant valvular abnormalities.  No pericardial effusion is present.  There is no prior study for direct comparison.  ______________________________________________________________________________  Left Ventricle  Global and regional left ventricular function is normal with an EF of 55-60%.  Left ventricular wall thickness is normal. Left ventricular size is normal.     Right Ventricle  Global right ventricular function is normal. The right ventricle is normal  size.     Atria  Both atria appear normal.     Mitral Valve  The mitral valve is normal. Trace mitral insufficiency is present.     Aortic Valve  The aortic valve is tricuspid. On Doppler interrogation, there is no  significant stenosis or regurgitation.     Tricuspid Valve  The tricuspid  valve is normal. Trace tricuspid insufficiency is present.  Pulmonary artery systolic pressure cannot be assessed.     Pulmonic Valve  The valve leaflets are not well visualized. Trace pulmonic insufficiency is  present.     Vessels  Sinuses of Valsalva 3.4 cm. Ascending aorta 2.7 cm. IVC diameter <2.1 cm  collapsing >50% with sniff suggests a normal RA pressure of 3 mmHg.     Pericardium  No pericardial effusion is present.     Compared to Previous Study  There is no prior study for direct comparison.  ______________________________________________________________________________  MMode/2D Measurements & Calculations     IVSd: 0.92 cm  LVIDd: 5.7 cm  LVIDs: 3.7 cm  LVPWd: 0.90 cm  FS: 35.5 %  LV mass(C)d: 201.6 grams  LV mass(C)dI: 101.9 grams/m2  asc Aorta Diam: 2.7 cm  LVOT diam: 2.2 cm  LVOT area: 3.8 cm2  LA Volume Index (BP): 27.9 ml/m2  RWT: 0.31  TAPSE: 2.6 cm     Doppler Measurements & Calculations  MV E max xochitl: 43.0 cm/sec  MV A max xochilt: 73.1 cm/sec  MV E/A: 0.59  MV dec slope: 186.7 cm/sec2  MV dec time: 0.23 sec  PA V2 max: 70.3 cm/sec  PA max P.0 mmHg  PA acc time: 0.09 sec  E/E' av.0  Lateral E/e': 5.5  Medial E/e': 6.4     ______________________________________________________________________________  Report approved by: Emely Pedroza 2022 10:47 AM        Cardiac Catheterization    Inferior STEMI in the setting of RPDA occlusion. There was also a 95%   lesion in the distal RCA. Previously placed stent in the mid RCA is patent   with mild in-stent restenosis. The RCA has moderate diffuse disease.    Status post successful PCI of the distal RCA lesion using a 3.0 x 16 mm   Synergy drug-eluting stent.    With wiring of the lesion, the clot in the RPDA was embolized further   distally. Due to the lesion in the RPDA being very distal and this being a   very small vessel at this point, it could not be stented and we elected to   proceed with conservative management.    Nonobstructive  coronary atherosclerosis in the left coronary system.      3/23   TTE: Left Ventricle: Global and regional left ventricular function is normal with an EF of 55-60%. Left ventricular wall thickness is normal. Left ventricular size is normal.  Right Ventricle: Global right ventricular function is normal. The right ventricle is normal  Size.  Mitral Valve: The mitral valve is normal. Trace mitral insufficiency is present.   Aortic Valve: The aortic valve is tricuspid. On Doppler interrogation, there is no  significant stenosis or regurgitation.   Tricuspid Valve: The tricuspid valve is normal. Trace tricuspid insufficiency is present.  Pulmonary artery systolic pressure cannot be assessed.   Pulmonic Valve:The valve leaflets are not well visualized. Trace pulmonic insufficiency is  present  Pericardium: No pericardial effusion is present.   Hedy Ortega, ROSE, AANP-C, CCRN  Critical Care Cardiology

## 2022-03-24 NOTE — PLAN OF CARE
Major Shift Events:  Neuro: DI PERRLA. Denies numbness/tingling. Pt requires frequent reminders to call before getting out of bed. PRN melatonin given to aid in sleep.  CV: SR w/ rare PVC's. Provider notified of new T wave inversion. BP WNL. Pt denies chest pain. Afebrile.  Pulm: Room air. LS clear/diminished.  GI/: Na restricted diet. Voids spontaneously.  Skin: R groin site remains unchanged w/ palpable pulses.  Plan: Transfer to .  For vital signs and complete assessments, please see documentation flowsheets.

## 2022-03-24 NOTE — CARE PLAN
SLUMS  Scoring If High School Educated If Less than High School Educated   Normal 27-30 25-30   Mild Neurocognitive Disorder 21-26 20-24   Dementia 1-20 1-19   The SLUMS is a cognitive screening assessment used to identify the presence of cognitive deficits, and/or to identify a change in cognition over time.      Patient Score: 15/30    Score Interpretation: Pt's SLUMS score indicates cognitive deficits in the areas of executive functioning & short term memory recall. Spoke with pt's wife and recommending 24 hour supervision for safety upon discharge to home. Per pt's wife, has an appointment with neurology to assess cognitive function.       3/24/2022 by Rachel Walker OT

## 2022-03-25 ENCOUNTER — APPOINTMENT (OUTPATIENT)
Dept: OCCUPATIONAL THERAPY | Facility: CLINIC | Age: 72
DRG: 247 | End: 2022-03-25
Payer: COMMERCIAL

## 2022-03-25 VITALS
DIASTOLIC BLOOD PRESSURE: 62 MMHG | RESPIRATION RATE: 18 BRPM | SYSTOLIC BLOOD PRESSURE: 125 MMHG | TEMPERATURE: 98.1 F | WEIGHT: 187.9 LBS | BODY MASS INDEX: 29.43 KG/M2 | OXYGEN SATURATION: 93 % | HEART RATE: 69 BPM

## 2022-03-25 LAB
ANION GAP SERPL CALCULATED.3IONS-SCNC: 6 MMOL/L (ref 3–14)
BUN SERPL-MCNC: 14 MG/DL (ref 7–30)
CALCIUM SERPL-MCNC: 8.8 MG/DL (ref 8.5–10.1)
CHLORIDE BLD-SCNC: 110 MMOL/L (ref 94–109)
CO2 SERPL-SCNC: 25 MMOL/L (ref 20–32)
CREAT SERPL-MCNC: 0.84 MG/DL (ref 0.66–1.25)
GFR SERPL CREATININE-BSD FRML MDRD: >90 ML/MIN/1.73M2
GLUCOSE BLD-MCNC: 93 MG/DL (ref 70–99)
HOLD SPECIMEN: NORMAL
MAGNESIUM SERPL-MCNC: 1.9 MG/DL (ref 1.6–2.3)
PHOSPHATE SERPL-MCNC: 2.9 MG/DL (ref 2.5–4.5)
POTASSIUM BLD-SCNC: 3.1 MMOL/L (ref 3.4–5.3)
POTASSIUM BLD-SCNC: 3.8 MMOL/L (ref 3.4–5.3)
SODIUM SERPL-SCNC: 141 MMOL/L (ref 133–144)
TROPONIN I SERPL HS-MCNC: 5918 NG/L

## 2022-03-25 PROCEDURE — 36415 COLL VENOUS BLD VENIPUNCTURE: CPT | Performed by: INTERNAL MEDICINE

## 2022-03-25 PROCEDURE — 250N000013 HC RX MED GY IP 250 OP 250 PS 637: Performed by: NURSE PRACTITIONER

## 2022-03-25 PROCEDURE — 83735 ASSAY OF MAGNESIUM: CPT | Performed by: INTERNAL MEDICINE

## 2022-03-25 PROCEDURE — 250N000013 HC RX MED GY IP 250 OP 250 PS 637

## 2022-03-25 PROCEDURE — 999N000147 HC STATISTIC PT IP EVAL DEFER

## 2022-03-25 PROCEDURE — 80048 BASIC METABOLIC PNL TOTAL CA: CPT | Performed by: NURSE PRACTITIONER

## 2022-03-25 PROCEDURE — 97530 THERAPEUTIC ACTIVITIES: CPT | Mod: GO

## 2022-03-25 PROCEDURE — 84100 ASSAY OF PHOSPHORUS: CPT | Performed by: INTERNAL MEDICINE

## 2022-03-25 PROCEDURE — 36415 COLL VENOUS BLD VENIPUNCTURE: CPT | Performed by: NURSE PRACTITIONER

## 2022-03-25 PROCEDURE — 250N000013 HC RX MED GY IP 250 OP 250 PS 637: Performed by: STUDENT IN AN ORGANIZED HEALTH CARE EDUCATION/TRAINING PROGRAM

## 2022-03-25 PROCEDURE — 250N000013 HC RX MED GY IP 250 OP 250 PS 637: Performed by: INTERNAL MEDICINE

## 2022-03-25 PROCEDURE — 84132 ASSAY OF SERUM POTASSIUM: CPT | Performed by: INTERNAL MEDICINE

## 2022-03-25 PROCEDURE — 84484 ASSAY OF TROPONIN QUANT: CPT | Performed by: INTERNAL MEDICINE

## 2022-03-25 PROCEDURE — 99239 HOSP IP/OBS DSCHRG MGMT >30: CPT | Performed by: NURSE PRACTITIONER

## 2022-03-25 PROCEDURE — 97535 SELF CARE MNGMENT TRAINING: CPT | Mod: GO

## 2022-03-25 RX ORDER — METOPROLOL SUCCINATE 25 MG/1
25 TABLET, EXTENDED RELEASE ORAL DAILY
Qty: 90 TABLET | Refills: 3 | Status: SHIPPED | OUTPATIENT
Start: 2022-03-26 | End: 2023-06-30 | Stop reason: ALTCHOICE

## 2022-03-25 RX ORDER — LISINOPRIL 10 MG/1
10 TABLET ORAL DAILY
Qty: 90 TABLET | Refills: 3 | Status: SHIPPED | OUTPATIENT
Start: 2022-03-25 | End: 2022-08-15

## 2022-03-25 RX ORDER — LISINOPRIL 10 MG/1
10 TABLET ORAL DAILY
Qty: 90 TABLET | Refills: 3 | Status: SHIPPED | OUTPATIENT
Start: 2022-03-25 | End: 2022-03-25

## 2022-03-25 RX ORDER — MAGNESIUM OXIDE 400 MG/1
400 TABLET ORAL 2 TIMES DAILY
Status: DISCONTINUED | OUTPATIENT
Start: 2022-03-25 | End: 2022-03-25

## 2022-03-25 RX ORDER — METOPROLOL SUCCINATE 25 MG/1
25 TABLET, EXTENDED RELEASE ORAL DAILY
Status: DISCONTINUED | OUTPATIENT
Start: 2022-03-25 | End: 2022-03-25 | Stop reason: HOSPADM

## 2022-03-25 RX ORDER — POTASSIUM CHLORIDE 1500 MG/1
20 TABLET, EXTENDED RELEASE ORAL 2 TIMES DAILY
Qty: 90 TABLET | Refills: 3 | COMMUNITY
Start: 2022-03-25

## 2022-03-25 RX ORDER — POTASSIUM CHLORIDE 750 MG/1
40 TABLET, EXTENDED RELEASE ORAL ONCE
Status: COMPLETED | OUTPATIENT
Start: 2022-03-25 | End: 2022-03-25

## 2022-03-25 RX ORDER — METOPROLOL SUCCINATE 25 MG/1
25 TABLET, EXTENDED RELEASE ORAL DAILY
Qty: 90 TABLET | Refills: 3 | Status: SHIPPED | OUTPATIENT
Start: 2022-03-26 | End: 2022-03-25

## 2022-03-25 RX ORDER — LISINOPRIL 10 MG/1
40 TABLET ORAL DAILY
Qty: 90 TABLET | Refills: 3 | Status: SHIPPED | OUTPATIENT
Start: 2022-03-25 | End: 2022-03-25

## 2022-03-25 RX ADMIN — LISINOPRIL 10 MG: 10 TABLET ORAL at 08:58

## 2022-03-25 RX ADMIN — ATORVASTATIN CALCIUM 80 MG: 80 TABLET, FILM COATED ORAL at 08:58

## 2022-03-25 RX ADMIN — ASPIRIN 81 MG: 81 TABLET, COATED ORAL at 08:58

## 2022-03-25 RX ADMIN — TICAGRELOR 90 MG: 90 TABLET ORAL at 08:58

## 2022-03-25 RX ADMIN — POTASSIUM CHLORIDE 40 MEQ: 750 TABLET, EXTENDED RELEASE ORAL at 08:58

## 2022-03-25 RX ADMIN — METOPROLOL SUCCINATE 25 MG: 25 TABLET, EXTENDED RELEASE ORAL at 08:58

## 2022-03-25 RX ADMIN — Medication 400 MG: at 08:58

## 2022-03-25 ASSESSMENT — ACTIVITIES OF DAILY LIVING (ADL)
ADLS_ACUITY_SCORE: 9

## 2022-03-25 NOTE — TELEPHONE ENCOUNTER
Follow-up orders placed. Patient discharged today at 1340. PC to wife and discussed plan of care. Transferred directly to  to arrange. FELIXRn

## 2022-03-25 NOTE — PLAN OF CARE
Dx: admitted 3/23 for STEMI s/p PCI to RCA    Neuro: A&O x3; disoriented to place. Increased confusion at HS. PRN melatonin given at HS. Pt slept most of the night starting from MN onwards. Bedside attendant present.  Cardiac: SR HR 60s at rest. AVSS. Audible S1/S2, no murmur.   Respiratory:  Normal breathing on RA. LS clear.   GI/: Denied nausea, bowel sounds audible. No BM this shift. LBM 3/24. Voiding up to BR.  Diet: cardiac   Skin: R groin site free of hematoma.   Pain: denied  LDAs: 2 PIVs  Electrolytes: await AM labs  Mobility: steady on feet. Up SBA    Plan:  to speak with pt and spouse prior to discharge.

## 2022-03-25 NOTE — DISCHARGE INSTRUCTIONS
"  Going home after a Heart Attack with Stent Placement    PROCEDURE SITE:  It is normal to have soreness, mild bruising or a small lump at the puncture site. You may shower but please do not use a hot tub, bath tub or pool for 2 days. Do not apply any lotion or powder near the site for 2 days. For 2 days when you cough, sneeze or push with a bowel movement place your hand over the puncture site and apply gentle pressure. For the first 2 days avoid squatting. Avoid lifting more than 10 pounds for at least 5 days. Further activity restrictions as below. If you feel a \"pop\" with pain and/or notice significant increase in pain, swelling or bleeding from the site- immediately lie down, press firmly on the site and proceed to the nearest medical facility.    ACTIVITY:  Keep in mind everyone will recover at a different pace. This can be related to your activity level prior to the heart attack as well as how much damage your heart attack caused. Eventually the goal per the American Heart Association is 30 minutes of moderate exercise 5 times a week. In the first 2 weeks it is best for all patient to avoid strenuous/vigorous exercise including sex.    MEDICATIONS:  1. You have begun Brilinta (ticagrelor). This medication is essential for your stent(s). Do not stop it without speaking first to your heart doctor or their nurse- this includes if you have concerns about side effects or cost. Also, if another health provider tells you to stop it, let them know they need to discuss it with your heart doctor.   2. If you are on Metformin (Glucophage) or any medications that contain this, you should not take it for at least 48 hours (2 days) from the time of your procedure. If you have baseline kidney problems, you may be instructed to also have a lab test drawn in 2-3 days before getting the okay to restart it.  3. If you have not been continued on other medications you were previously taking at home it is probably for reason " though please discuss your concerns with any of your doctors.     DIET:  We recommend a diet low in saturated fat, trans fat and cholesterol. In addition it will be helpful to be cautious of sodium intake and carbohydrates. Try to increase the amount of lean meats you eat like fish and chicken, but avoid frying; and reduce the amount of red meat you eat. Eat more fresh fruits and vegetables and try to avoid canned and processed food. Please reference the handouts you received for more specific information.    CARDIAC REHAB:  After the initial healing process of the procedure site, we recommend cardiac rehabilitation for all heart attack and stent patients. Cardiac rehabilitation will help you:  - Rebuild stamina, strength and balance.  - Learn how to participate in activities safely, as well as help you regain confidence to do so.  - Return to activities of daily living and leisure.  You should receive a call from them within the next week, but if you do not or you would like to call you can contact their central scheduling at [phone]    OTHER INFORMATION:  1. Consider having your family members learn CPR if they do not know it already.  2. It's not uncommon for heart attack sufferers to experience feelings of depression, anxiety or denial. Please do not be afraid to discuss these symptoms with any of your health providers at any point in your recovery.  3. If you are a smoker, quitting smoking will be one of the most important things you can do for yourself. There are nicotine replacement options or medications they might be able to be prescribed. Please discuss this with your doctors. Consider calling the QuitPlan at [phone] ([phone]) as they can offer ongoing support after discharge.    CALL YOUR DOCTOR IF:  -You have a large or growing lump/bump around the procedure site  -The site is red, swollen, hot, tender or has drainage  -You have hives, a rash or unusual itching  -You have increasing or worsening  shortness of breath or chest pain    FOLLOW UP:  We prefer you to follow up with your primary care provider within one week. You will be arranged to see the cardiologist (heart doctor), Dr. Solis (or one of his colleagues) in clinic in about two weeks    Should you need to contact us:  Cardiology clinic for scheduling or triage nurse questions/concerns:  [phone]

## 2022-03-25 NOTE — TELEPHONE ENCOUNTER
===View-only below this line===  ----- Message -----  From: Pablito Solis DO  Sent: 3/25/2022  12:37 PM CDT  To: Jaime Isaacs RN    Post PCI with KATIE.  6 week with me.

## 2022-03-25 NOTE — PLAN OF CARE
Physical Therapy: Orders received. Chart reviewed and discussed with care team.? Physical Therapy not indicated due to patient mobilizing at baseline, ambulating w/o AD and safely navigates stairs.?Per OT, patient with cognitive deficits that will require 24 hour supervision which family will be able to provider. Defer discharge recommendations to OT who will continue to follow while inpatient.? PT will complete orders.

## 2022-03-25 NOTE — DISCHARGE SUMMARY
[address] [address]  p: [phone]    Discharge Summary: Cardiology Service    Martell Patel MRN# [id number]   YOB: 1950 Age: 72 year old       Admission Date: 03/23/2022  Discharge Date: 03/25/2022    Discharge Diagnoses:  # STEMI  # CAD s/p PCI RCA (2016, 3/23/2022)  # HLD  # Tobacco Abuse  # Cognitive Impairment  # Hypokalemia  # Thrombocytopenia    Brief HPI:  Martell Patel is a 72 y.o.M with a PMhx of CAD s/p PCI RCA (2016), HTN, HLD, smoker who was admitted 3/23 with chest pain, found to have STEMI s/p PCI to RCA    Hospital Course by Diagnosis:  # STEMI  # CAD s/p PCI RCA (2016, 3/23/2022)  # HLD  # Hypokalemia, appears chronic  Pt admitted 3/23 with severe substernal chest pressure at rest associated with nausea that improved with SL Nitroglycerin. EKG in ED notable for inferior Graeme, pt taken for emergent coronary angiogram. This revealed restenosis of RCA, s/p PCI. Small clot in distal RPDA, pt on Integrillin gtt for 12 hours. Troponin peak 23, 758, now down trending. BP soft post procedure, pt briefly on Levo gtt. Echo with EF 55-60%, no WMA. Patient now chest pain free.     - DAPT: 81 mg ASA daily (lifelong), Brilinta 90 mg BID (at least 1 year, consider lifelong)  - BB: Metoprolol 25 mg daily  - ACEi: Lisinopril 10 mg daily (was previously on 40 mg daily at home, dose lowered d/t soft BP)  - Statin: Continue PTA Lipitor  - Volume status: euvolemic, discontinued PTA hydrochlorothiazide   - Given soft BP, also discontinued PTA Norvasc and Imdur  - Pt will need close follow up with PCP and Cardiology (follows with Dr. Solis) to reassess BP given several med changes  - OP Cardiac Rehab  - K replaced during hospitalization, pt to continue KCl    # Tobacco Abuse  1.5 PPD, pt wife reports has tried quitting several times. She states patches, gum, have been ineffective.   - Day of discharge pt felt he was ready to quit, he  did met with one of our RN about smoking cessation trial  - Continue to encourage cessation  - Patches, gum, inhaler declined day of discharge    # Cognitive Impairment  Sundowning at night, pt impulsive. OT completed SLUMS assessment, 15/30. Per patient and wife, not a new issues, has been getting progressively worse. He is arranged to see Neurology in [place] as OP.   - OP CR  - Follow up with Neuro as previously arranged  - Pt and family feel safe with discharge home (he will have someone with him at home)    # Mild Thrombocytopenia  Intermittent low Plt, 132-150  - no additional work up needed at this time    Pertinent Procedures:  1. Coronary Angiogram 3/23/2022    Medication Changes:  (Refill for ASA provided)  START Brilinta 90 mg BID  START Toprol 25 mg daily  STOP Norvasc  STOP hydrochlorothiazide  STOP Imdur    Discharge medications:   Current Discharge Medication List      START taking these medications    Details   aspirin (ASA) 81 MG EC tablet Take 1 tablet (81 mg) by mouth daily Start tomorrow.  Qty: 90 tablet, Refills: 3    Associated Diagnoses: Status post insertion of drug eluting coronary artery stent      metoprolol succinate ER (TOPROL-XL) 25 MG 24 hr tablet Take 1 tablet (25 mg) by mouth daily  Qty: 90 tablet, Refills: 3    Associated Diagnoses: ST elevation myocardial infarction involving right coronary artery (H)      ticagrelor (BRILINTA) 90 MG tablet Take 1 tablet (90 mg) by mouth every 12 hours  Qty: 90 tablet, Refills: 3    Associated Diagnoses: ST elevation myocardial infarction involving right coronary artery (H)         CONTINUE these medications which have CHANGED    Details   lisinopril (ZESTRIL) 10 MG tablet Take 1 tablet (10 mg) by mouth daily  Qty: 90 tablet, Refills: 3    Associated Diagnoses: ST elevation myocardial infarction involving right coronary artery (H)         CONTINUE these medications which have NOT CHANGED    Details   potassium chloride ER (KLOR-CON M) 20 MEQ CR  tablet Take 1 tablet (20 mEq) by mouth 2 times daily  Qty: 90 tablet, Refills: 3      atorvastatin (LIPITOR) 80 MG tablet Take 80 mg by mouth At Bedtime      fluticasone (FLONASE ALLERGY RELIEF) 50 MCG/ACT nasal spray USE 1-2 SPRAYS INTO EACH NOSTRIL ONCE DAILY      nitroGLYcerin (NITROSTAT) 0.4 MG sublingual tablet Place 0.4 mg under the tongue as needed         STOP taking these medications       amLODIPine (NORVASC) 10 MG tablet Comments:   Reason for Stopping:         ASPIRIN Comments:   Reason for Stopping:         hydrochlorothiazide (MICROZIDE) 12.5 MG capsule Comments:   Reason for Stopping:         ibuprofen (ADVIL,MOTRIN) 200 MG tablet Comments:   Reason for Stopping:         isosorbide mononitrate (IMDUR) 30 MG 24 hr tablet Comments:   Reason for Stopping:               Follow-up:  - PCP 7-10 days for post hospitalization visit, BP check  - Dr. Solis, [facility] 2 weeks for STEMI follow up    Code status:  Full    Condition on discharge  Temp:  [97.5  F (36.4  C)-98.1  F (36.7  C)] 98.1  F (36.7  C)  Pulse:  [66-76] 69  Resp:  [16-18] 18  BP: (125-144)/(62-85) 125/62  SpO2:  [93 %-97 %] 93 %  General: Alert, interactive, NAD  Eyes: sclera anicteric, EOMI  Neck: no JVD, carotid 2+ bilaterally  Cardiovascular: regular rate and rhythm, normal S1 and S2, no murmurs, gallops, or rubs  Resp: clear to auscultation bilaterally, no rales, wheezes, or rhonchi  GI: Soft, nontender, nondistended. +BS.  No HSM or masses, no rebound or guarding.  Extremities: no edema, no cyanosis or clubbing, dorsalis pedis and posterior tibialis pulses 2+ bilaterally  Skin: Warm and dry, no jaundice or rash  Neuro: CN 2-12 intact, moves all extremities equally  Psych: Alert & oriented x 3    Imaging with results:  Echocardiogram 3/23/2022:  Interpretation Summary  Technically difficult study. Poor acoustic windows.  Global and regional left ventricular function is normal with an EF of 55-60%.  Global right ventricular  function is normal. The right ventricle is normal  size.  No significant valvular abnormalities.  No pericardial effusion is present.  There is no prior study for direct comparison.    Coronary Angiogram 3/23/2022:  Conclusion    Inferior STEMI in the setting of RPDA occlusion. There was also a 95% lesion in the distal RCA. Previously placed stent in the mid RCA is patent with mild in-stent restenosis. The RCA has moderate diffuse disease.    Status post successful PCI of the distal RCA lesion using a 3.0 x 16 mm Synergy drug-eluting stent.    With wiring of the lesion, the clot in the RPDA was embolized further distally. Due to the lesion in the RPDA being very distal and this being a very small vessel at this point, it could not be stented and we elected to proceed with conservative management.    Nonobstructive coronary atherosclerosis in the left coronary system.        Plan    Follow bedrest per protocol    Continued medical management and lifestyle modifications for cardiovascular risk factor optimizations.    Arterial sheath removed from femoral artery with closure device.    Femoral angiogram identifies arterial sheath placement suitable for closure device.    Discontinue tobacco smokingDiscontinue smoking    Cardiac rehabilitation.    Admit to inpatient    Continuation of dual antiplatelet therapy for 12 months   Post antiplatelet therapy of    Aspirin; give 81 mg qd .     Ticagrelor; give 180 mg now and 90 mg BID.      Continue high dose statin therapy  -Continue Integrilin infusion for 12 hours post PCI.  Aspirin and Brilinta for dual antiplatelet therapy.     Recommendations  General Recommendations:  - Patient given specific instructions regarding care of arteriotomy site, activity restrictions, signs and symptoms of cardiac or vascular complications and to seek immediate medical evaluation should they occur.   - Arterial sheath removed from the femoral artery with closure device.   - Femoral angiogram  identifies arterial sheath placement is suitable for closure device.   - Discontinue tobacco smoking.   - Recommend cardiac rehabilitation.     Medications:  - Continue dual antiplatelet therapy for 12 month(s).   - Continue high dose statin therapy indefinitely.   - Risk factor management for atherosclerosis.     -Continue Integrilin infusion for 12 hours post PCI.  Aspirin and Brilinta for dual antiplatelet therapy.     Coronary Findings  DiagnosticDominance: Right    Left Main   Mid LM lesion is 30% stenosed.   Left Anterior Descending   Prox LAD lesion is 20% stenosed.   Mid LAD lesion is 20% stenosed.   Right Coronary Artery   Mid RCA lesion is 20% stenosed. The lesion was previously treated using a stent of unknown type.   Dist RCA lesion is 99% stenosed.   Right Posterior Descending Artery   RPDA lesion is 100% stenosed.       Intervention  Dist RCA lesion   Stent   Lesion length: 12 mm. A CATH GUIDING BLUE YELLOW PTFE JR4 [id number] guide catheter was successfully placed. The GUIDEWIRE TravtarON BLUE 190CM J TIP [id number] crossed the lesion. The pre-interventional distal flow is decreased (LEONIDES 1). A STENT CORONARY CHINA SYNERGY XD MR US 3.21D81SD [id number] drug eluting stent was successfully placed. Pre-stent angioplasty was performed using a CATH BALLOON EMERGE 2.0X12MM [id number] supply. . No post-stent angioplasty was performed. The post-interventional distal flow is normal (LEONIDES 3). The intervention was successful. No complications occurred at this lesion.   There is a 0% residual stenosis post intervention.         Other imaging studies:  EKG 12 Lead 3/23/2022: NSR HR 63      Patient Care Team:  Isela Jay MD as PCP - General (Family Practice)  Pablito Solis DO as Assigned Heart and Vascular Provider    Time Spent on this Encounter   Katherine GARCIA CNP, personally saw the patient today and spent greater than 30 minutes discharging this patient. Instructions  also discussed with pt wife, over the phone    Sara ROSE CNP  [facility]      Physician Attestation   I, Castillo Benoit MD, have reviewed and discussed with the advanced practice provider their discharge plan for Martell Patel. I did not participate in a shared visit by interviewing or examining the patient and this should be billed as an advanced practice provider only discharge.    Castillo Benoit

## 2022-03-25 NOTE — PROGRESS NOTES
DISCHARGE                      3/25/2022  ----------------------------------------------------------------------------  Discharged to: Home  Accompanied by: Daughter and Son in Law  Discharge Instructions: Diet, activity, medications, follow up appointments, and when to call the MD reviewed with patient who voiced understanding.  Prescriptions: To be filled [facility] per pt's request; medication list reviewed & sent with pt  Follow Up Appointments: Arranged; information given  Belongings: All sent with pt  IV: out  Telemetry: off  Pt exhibits understanding of above discharge instructions; all questions answered.    Discharge Paperwork: Signed, copied, and sent home with patient.

## 2022-03-25 NOTE — TELEPHONE ENCOUNTER
Covid test order. Patient refused.    Dr Ley please review hospital course. Pt admitted and CORS with PCI to RCA, currently still inpatient, but plan for possible discharge today. Would you recommend post-PCI 1-2 week follow-up with NP/KATIE, and then follow-up with you 6  weeks after that? Please advise. CMM,Rn

## 2022-03-26 DIAGNOSIS — Z71.89 OTHER SPECIFIED COUNSELING: ICD-10-CM

## 2022-03-26 NOTE — ED TRIAGE NOTES
Chest pain for the last 45 mins. 1Nitro by EMS, 2 324 ASA at home, Hx of CAD.  Pt complains of Nausa, pt appears pale/gray in color, EMS reports Elevation in their ECG.   Patient just informed this nurse he needs to be discharged on Monday because he has a doctor's appt on Tuesday that he \"desperately\" needs to go to in order to get orders for all his medications. He reports he ran out of his medications \"about four days ago\" and this is why he had a \"nervous breakdown\".  Assured patient this information would be passed to the treatment team.

## 2022-03-26 NOTE — PLAN OF CARE
Occupational Therapy Discharge Summary    Reason for therapy discharge:    Discharged to home w/assist from family     Progress towards therapy goal(s). See goals on Care Plan in [facility] electronic health record for goal details.  Goals met    Therapy recommendation(s):    No further therapy is recommended.

## 2022-03-27 ENCOUNTER — PATIENT OUTREACH (OUTPATIENT)
Dept: CARE COORDINATION | Facility: CLINIC | Age: 72
End: 2022-03-27
Payer: COMMERCIAL

## 2022-03-27 NOTE — PROGRESS NOTES
Clinic Care Coordination Contact  [facility]: Post-Discharge Note  SITUATION                                                      Admission:    Admission Date: 03/23/22   Reason for Admission: ST elevation myocardial infarction (STEMI), unspecified artery (H)Chest pain in adult  Discharge:   Discharge Date: 03/25/22  Discharge Diagnosis: ST elevation myocardial infarction (STEMI), unspecified artery (H)Chest pain in adult    BACKGROUND                                                      Per hospital discharge summary and inpatient provider notes:  # STEMI  # CAD s/p PCI RCA (2016, 3/23/2022)  # HLD  # Hypokalemia, appears chronic  Pt admitted 3/23 with severe substernal chest pressure at rest associated with nausea that improved with SL Nitroglycerin. EKG in ED notable for inferior Graeme, pt taken for emergent coronary angiogram. This revealed restenosis of RCA, s/p PCI. Small clot in distal RPDA, pt on Integrillin gtt for 12 hours. Troponin peak 23, 758, now down trending. BP soft post procedure, pt briefly on Levo gtt. Echo with EF 55-60%, no WMA. Patient now chest pain free.      - DAPT: 81 mg ASA daily (lifelong), Brilinta 90 mg BID (at least 1 year, consider lifelong)  - BB: Metoprolol 25 mg daily  - ACEi: Lisinopril 10 mg daily (was previously on 40 mg daily at home, dose lowered d/t soft BP)  - Statin: Continue PTA Lipitor  - Volume status: euvolemic, discontinued PTA hydrochlorothiazide   - Given soft BP, also discontinued PTA Norvasc and Imdur  - Pt will need close follow up with PCP and Cardiology (follows with Dr. Solis) to reassess BP given several med changes  - OP Cardiac Rehab  - K replaced during hospitalization, pt to continue KCl     # Tobacco Abuse  1.5 PPD, pt wife reports has tried quitting several times. She states patches, gum, have been ineffective.   - Day of discharge pt felt he was ready to quit, he did met with one of our RN about smoking cessation trial  - Continue to encourage  cessation  - Patches, gum, inhaler declined day of discharge     # Cognitive Impairment  Sundowning at night, pt impulsive. OT completed SLUMS assessment, 15/30. Per patient and wife, not a new issues, has been getting progressively worse. He is arranged to see Neurology in [place] as OP.   - OP CR  - Follow up with Neuro as previously arranged  - Pt and family feel safe with discharge home (he will have someone with him at home)     # Mild Thrombocytopenia  Intermittent low Plt, 132-150  - no additional work up needed at this time     Pertinent Procedures:  1. Coronary Angiogram 3/23/2022     Medication Changes:  (Refill for ASA provided)  START Brilinta 90 mg BID  START Toprol 25 mg daily  STOP Norvasc  STOP hydrochlorothiazide  STOP Imdur         ASSESSMENT      Enrollment  Primary Care Care Coordination Status: Not a Candidate    Discharge Assessment  How are you doing now that you are home?: Pt reports he is doing well. No questions or concerns at this time.  How are your symptoms? (Red Flag symptoms escalate to triage hotline per guidelines): Improved  Do you feel your condition is stable enough to be safe at home until your provider visit?: Yes  Does the patient have their discharge instructions? : Yes  Does the patient have questions regarding their discharge instructions? : No  Were you started on any new medications or were there changes to any of your previous medications? : Yes  Does the patient have all of their medications?: Yes  Do you have questions regarding any of your medications? : No  Do you have all of your needed medical supplies or equipment (DME)?  (i.e. oxygen tank, CPAP, cane, etc.): Yes  Discharge follow-up appointment scheduled within 14 calendar days? : Yes  Discharge Follow Up Appointment Date: 04/11/22  Discharge Follow Up Appointment Scheduled with?: Specialty Care Provider              PLAN                                                      Outpatient Plan:  - PCP 7-10 days for  post hospitalization visit, BP check  - Dr. Solis [facility] 2 weeks for STEMI follow up    Future Appointments   Date Time Provider Department Center   4/11/2022 10:30 AM Rebecca Candelario APRN CNP [facility]   5/13/2022 11:20 AM Pablito Solis DO [facility]         For any urgent concerns, please contact our 24 hour nurse triage line: [phone] ([phone])         DARRYL Staton  [phone]  [facility]

## 2022-03-30 LAB — LPA SERPL-MCNC: 34 MG/DL

## 2022-03-31 ENCOUNTER — LAB REQUISITION (OUTPATIENT)
Dept: LAB | Facility: CLINIC | Age: 72
End: 2022-03-31
Payer: COMMERCIAL

## 2022-03-31 DIAGNOSIS — I21.11 ST ELEVATION (STEMI) MYOCARDIAL INFARCTION INVOLVING RIGHT CORONARY ARTERY (H): ICD-10-CM

## 2022-03-31 DIAGNOSIS — R41.3 OTHER AMNESIA: ICD-10-CM

## 2022-03-31 LAB
ANION GAP SERPL CALCULATED.3IONS-SCNC: 11 MMOL/L (ref 5–18)
BUN SERPL-MCNC: 15 MG/DL (ref 8–28)
CALCIUM SERPL-MCNC: 9.2 MG/DL (ref 8.5–10.5)
CHLORIDE BLD-SCNC: 107 MMOL/L (ref 98–107)
CO2 SERPL-SCNC: 24 MMOL/L (ref 22–31)
CREAT SERPL-MCNC: 0.93 MG/DL (ref 0.7–1.3)
GFR SERPL CREATININE-BSD FRML MDRD: 86 ML/MIN/1.73M2
GLUCOSE BLD-MCNC: 97 MG/DL (ref 70–125)
POTASSIUM BLD-SCNC: 4.4 MMOL/L (ref 3.5–5)
SODIUM SERPL-SCNC: 142 MMOL/L (ref 136–145)
TSH SERPL DL<=0.005 MIU/L-ACNC: 3.25 UIU/ML (ref 0.3–5)
VIT B12 SERPL-MCNC: 386 PG/ML (ref 213–816)

## 2022-03-31 PROCEDURE — 82607 VITAMIN B-12: CPT | Mod: ORL | Performed by: FAMILY MEDICINE

## 2022-03-31 PROCEDURE — 84443 ASSAY THYROID STIM HORMONE: CPT | Mod: ORL | Performed by: FAMILY MEDICINE

## 2022-03-31 PROCEDURE — 80048 BASIC METABOLIC PNL TOTAL CA: CPT | Mod: ORL | Performed by: FAMILY MEDICINE

## 2022-04-07 ENCOUNTER — HOSPITAL ENCOUNTER (OUTPATIENT)
Dept: CARDIAC REHAB | Facility: HOSPITAL | Age: 72
Discharge: HOME | End: 2022-04-07
Attending: STUDENT IN AN ORGANIZED HEALTH CARE EDUCATION/TRAINING PROGRAM
Payer: COMMERCIAL

## 2022-04-07 DIAGNOSIS — Z95.5 STATUS POST INSERTION OF DRUG ELUTING CORONARY ARTERY STENT: ICD-10-CM

## 2022-04-07 PROCEDURE — 93797 PHYS/QHP OP CAR RHAB WO ECG: CPT | Mod: XU | Performed by: OCCUPATIONAL THERAPIST

## 2022-04-07 PROCEDURE — 93798 PHYS/QHP OP CAR RHAB W/ECG: CPT | Performed by: OCCUPATIONAL THERAPIST

## 2022-04-07 PROCEDURE — 999N000108 HC STATISTIC OP CARDIAC VISIT #2: Performed by: OCCUPATIONAL THERAPIST

## 2022-04-07 PROCEDURE — 999N000109 HC STATISTIC OP CR VISIT: Performed by: OCCUPATIONAL THERAPIST

## 2022-04-08 NOTE — PROGRESS NOTES
Assessment/Recommendations   Assessment:    1.  Coronary artery disease with status post PCI to RCA in 2016: Recent hospitalization with STEMI.  Coronary angiogram showed 99% stenosis in distal RCA and high-grade circumflex lesion in RPDA.  Distal RCA was successfully treated with a drug-eluting stent.  Due to the lesion being too small, RPDA could not be stented and therefore recommended conservative management.    On dual antiplatelet therapy with ASA 81 mg indefinitely and Ticagrelor (Brilinta) 90 mg twice a dayfor 12 months.  Patient denies any     Cardiac rehab has been initiated.    Reviewed most recent BMP, Hgb, platelet- stable.    2.  Dyslipidemia with LDL goal <70/Obesity with a BMI of: Martell Patel is on high intensity statin therapy with atorvastatin 80 mg daily. Most recent LDL is 72 unsure this medicine as it is not a treatment member for INR.  Most recent AST/ALT are stable except ALT 69 in March.    3.  Hypertension: His blood pressure is 162/76 and recheck was 138/70. Currently on lisinopril 10 mg daily, Metroprolol succinate 25 mg daily was started in the hospital.  Lisinopril dose was reduced in the hospital due to soft blood pressure.  Norvasc, hydrochlorothiazide, and Imdur were discontinued for same reason.  Patient is also on potassium chloride 20 mEq twice a day.  Potassium 4.4 on 3/31/2020.    Blood pressure was 120/74 at PCP office.  Reviewed cardiac rehab blood pressure readings-stable.    4.  Tobacco abuse: Patient continues to smoke although he has cut back.  He is using nicotine gum.  He had some rash from nicotine patch.  History of intolerance to Chantix and Wellbutrin.    5.  Mild thrombocytopenia: Recent platelet level 142.  Denies bleeding complications.    6.  Mild cognitive impairment: Followed up with PCP in April and also neurology in March.  He has driving test at the end of this month.    Plan:  - We discussed the importance of antiplatelet therapy and talking with  his cardiologist prior to stopping these medications for any reason.  We discussed about utilization of as needed nitroglycerin.     - Encouraged to seek medical attention if recurrent chest pain or shortness of breath.    - Risk factor modification and lifestyle management topics were discussed including managing comorbidities, weight loss, heart healthy diet, exercise, smoking cessation and stress reduction.      - Cardiac rehab as scheduled/schedule cardiac rehab    - We discussed a diet low in saturated fat, weight loss, and exercise along with medication for better control of cholesterol.  Highly encouraged to participate in nutrition class in cardiac rehab.    - Continue current hypertension regimen.  Patient was encouraged to call PCP or primary cardiology if blood pressure continues to remain elevated greater than 130/80 in cardiac rehab.    -Instructed to take Tylenol as needed for knee pain before exercising or going for walk.    Follow up with Dr. Solis as scheduled in May.      History of Present Illness/Subjective    Mr. Martell Patel is a 72 year old male with a past medical history of hypertension, hyperlipidemia, smoker, sleep apnea, chronic inflammatory demyelinating polyneuropathy, ataxia, and coronary artery disease with status post PCI to RCA in 2016 who is seen at [facility] for post coronary intervention follow up.     Patient was hospitalized from March 23 through March 25 with chest pressure associated with nausea improved with sublingual nitroglycerin.  Patient was diagnosed with STEMI.  Coronary angiogram showed 99% stenosis in distal RCA and 100% occlusion in RPDA.  Distal RCA was successfully treated with a drug-eluting stent.  Due to risk of being too small, unable to put stent and therefore recommended conservative management.  Echocardiogram showed preserved LVEF 55 to 60% with no wall motion abnormalities.    Today, Martell is here accompanied by  his wife.  He denies fatigue, lightheadedness, shortness of breath, dyspnea on exertion, orthopnea, PND, palpitations, chest pain, abdominal fullness/bloating and lower extremity edema.  He has some issue with his gait instability because of the history of polyneuropathy.  He was on ibuprofen for knee pain and was taken off in the hospital.  He is using topical analgesic for his right knee pain.  His exercise and physical activity is limited from arthritic pain.    Coronary Angiogram done on 3/23/2022: reviewed:  Conclusion  Inferior STEMI in the setting of RPDA occlusion. There was also a 95% lesion in the distal RCA. Previously placed stent in the mid RCA is patent with mild in-stent restenosis. The RCA has moderate diffuse disease.  Status post successful PCI of the distal RCA lesion using a 3.0 x 16 mm Synergy drug-eluting stent.  With wiring of the lesion, the clot in the RPDA was embolized further distally. Due to the lesion in the RPDA being very distal and this being a very small vessel at this point, it could not be stented and we elected to proceed with conservative management.  Nonobstructive coronary atherosclerosis in the left coronary system.  Ost Cx to Prox Cx lesion is 20% stenosed.  1st Mrg lesion is 30% stenosed.     Plan    Follow bedrest per protocol    Continued medical management and lifestyle modifications for cardiovascular risk factor optimizations.    Arterial sheath removed from femoral artery with closure device.    Femoral angiogram identifies arterial sheath placement suitable for closure device.    Discontinue tobacco smokingDiscontinue smoking    Cardiac rehabilitation.    Admit to inpatient    Continuation of dual antiplatelet therapy for 12 months   Post antiplatelet therapy of    Aspirin; give 81 mg qd .     Ticagrelor; give 180 mg now and 90 mg BID.      Continue high dose statin therapy  -Continue Integrilin infusion for 12 hours post PCI.  Aspirin and Brilinta for dual  antiplatelet therapy.       ECHO done on 3/23/2022-Reviewed:   Interpretation Summary  Technically difficult study. Poor acoustic windows.  Global and regional left ventricular function is normal with an EF of 55-60%.  Global right ventricular function is normal. The right ventricle is normal  size.  No significant valvular abnormalities.  No pericardial effusion is present.  There is no prior study for direct comparison     Physical Examination Review of Systems   /70   Pulse 76   Resp 16   Wt 87.1 kg (192 lb)   BMI 30.07 kg/m    Body mass index is 30.07 kg/m .  Wt Readings from Last 3 Encounters:   04/11/22 87.1 kg (192 lb)   03/25/22 85.2 kg (187 lb 14.4 oz)   11/19/21 88.7 kg (195 lb 9.6 oz)     General Appearance:   no distress, normal body habitus   ENT/Mouth: membranes moist, no oral lesions or bleeding gums.      EYES:  no scleral icterus, normal conjunctivae   Neck: no carotid bruits or thyromegaly   Chest/Lungs:   lungs are clear to auscultation, no rales or wheezing, equal chest wall expansion    Cardiovascular:   Heart rate regular. Normal first and second heart sounds with no murmurs, rubs, or gallops; the carotid, radial and posterior tibial pulses are intact, Jugular venous pressure fflat with no edema bilaterally    Abdomen:  no organomegaly, masses, bruits, or tenderness; bowel sounds are present   Extremities  Puncture Site: no cyanosis or clubbing  Right femoral site intact. Radial pulses and Pedal pulses intact and symmetrical.  CMS intact.   Skin: no xanthelasma, warm.    Neurologic: normal  bilateral, no tremors   Psychiatric: alert and oriented x3, calm              Negative unless noted in HPI     Medical History  Surgical History Family History Social History   Past Medical History:   Diagnosis Date     Arthritis      Ataxia      Chest pain      CIDP (chronic inflammatory demyelinating polyneuropathy) (H)     Remnant is that his sense of balance is impaired, particularly at  "night.     Coronary artery disease      HLD (hyperlipidemia)      HTN (hypertension)      Hyperlipidemia      Hypertension      Hypertension      Sleep apnea     Past Surgical History:   Procedure Laterality Date     APPENDECTOMY       APPENDECTOMY       CARDIAC CATHETERIZATION  12/06/2016     CARDIAC CATHETERIZATION N/A 12/6/2016    Procedure: Coronary Angiogram;  Surgeon: Duane Gandhi MD;  Location: [facility];  Service:      CORONARY STENT PLACEMENT  12/06/2016    [place] to RCA.     CV CORONARY ANGIOGRAM N/A 3/23/2022    Procedure: Coronary Angiogram;  Surgeon: Castillo Benoit MD;  Location: [facility] CARDIAC CATH LAB     CV PCI N/A 3/23/2022    Procedure: Percutaneous Coronary Intervention;  Surgeon: Castillo Benoit MD;  Location: [facility] CARDIAC CATH LAB     ENT SURGERY      tonsilectomy     ORTHOPEDIC SURGERY       REVISION AMPUTATION OF FINGER      Top of right pointer finger - cut on      THROAT SURGERY      \"To remove some polyps in my throat.  No, they weren't cancer.\"     TONSILLECTOMY       US BIOPSY FINE NEEDLE ASPIRATION LYMPH NODE (BREAST) LEFT Left 7/6/2021    Family History   Problem Relation Age of Onset     Coronary Artery Disease Brother      Coronary Artery Disease Brother     Social History     Socioeconomic History     Marital status:      Spouse name: Not on file     Number of children: Not on file     Years of education: Not on file     Highest education level: Not on file   Occupational History     Not on file   Tobacco Use     Smoking status: Current Every Day Smoker     Packs/day: 1.50     Years: 50.00     Pack years: 75.00     Smokeless tobacco: Never Used   Substance and Sexual Activity     Alcohol use: No     Comment: Alcoholic Drinks/day: No alcohol since 1978     Drug use: No     Sexual activity: Not on file   Other Topics Concern     Not on file   Social History Narrative     Not on file     Social Determinants of Health     Financial Resource " Strain: Not on file   Food Insecurity: Not on file   Transportation Needs: Not on file   Physical Activity: Not on file   Stress: Not on file   Social Connections: Not on file   Intimate Partner Violence: Not on file   Housing Stability: Not on file          Medications  Allergies   Current Outpatient Medications   Medication Sig Dispense Refill     aspirin (ASA) 81 MG EC tablet Take 1 tablet (81 mg) by mouth daily Start tomorrow. 90 tablet 3     atorvastatin (LIPITOR) 80 MG tablet Take 80 mg by mouth At Bedtime       fluticasone (FLONASE) 50 MCG/ACT nasal spray USE 1-2 SPRAYS INTO EACH NOSTRIL ONCE DAILY       lisinopril (ZESTRIL) 10 MG tablet Take 1 tablet (10 mg) by mouth daily 90 tablet 3     metoprolol succinate ER (TOPROL-XL) 25 MG 24 hr tablet Take 1 tablet (25 mg) by mouth daily 90 tablet 3     nitroGLYcerin (NITROSTAT) 0.4 MG sublingual tablet Place 1 tablet (0.4 mg) under the tongue every 5 minutes as needed for chest pain For chest pain place 1 tablet under the tongue every 5 minutes for 3 doses. If symptoms persist 5 minutes after 1st dose call 911. 30 tablet 1     potassium chloride ER (KLOR-CON M) 20 MEQ CR tablet Take 1 tablet (20 mEq) by mouth 2 times daily 90 tablet 3     ticagrelor (BRILINTA) 90 MG tablet Take 1 tablet (90 mg) by mouth in the morning and 1 tablet (90 mg) in the evening. 180 tablet 3    Allergies   Allergen Reactions     Wellbutrin [Bupropion] Other (See Comments)     Nightmare     Varenicline      Other reaction(s): night bhakta         Lab Results    Chemistry/lipid CBC Cardiac Enzymes/BNP/TSH/INR   Lab Results   Component Value Date    CHOL 138 03/23/2022    HDL 57 03/23/2022    TRIG 45 03/23/2022    BUN 15 03/31/2022     03/31/2022    CO2 24 03/31/2022    Lab Results   Component Value Date    WBC 6.9 03/24/2022    HGB 14.7 03/24/2022    HCT 43.2 03/24/2022    MCV 87 03/24/2022     (L) 03/24/2022    Lab Results   Component Value Date    TSH 3.25 03/31/2022    INR 1.02  03/23/2022        46 minutes spent on the date of encounter doing chart review, review of outside records, review of test results, interpretation with above tests, patient visit, documentation and discussion with family.        This note has been dictated using voice recognition software. Any grammatical, typographical, or context distortions are unintentional and inherent to the software

## 2022-04-11 ENCOUNTER — OFFICE VISIT (OUTPATIENT)
Dept: CARDIOLOGY | Facility: CLINIC | Age: 72
End: 2022-04-11
Attending: INTERNAL MEDICINE
Payer: COMMERCIAL

## 2022-04-11 VITALS
BODY MASS INDEX: 30.07 KG/M2 | WEIGHT: 192 LBS | SYSTOLIC BLOOD PRESSURE: 138 MMHG | RESPIRATION RATE: 16 BRPM | DIASTOLIC BLOOD PRESSURE: 70 MMHG | HEART RATE: 76 BPM

## 2022-04-11 DIAGNOSIS — I25.110 CORONARY ARTERY DISEASE INVOLVING NATIVE CORONARY ARTERY OF NATIVE HEART WITH UNSTABLE ANGINA PECTORIS (H): ICD-10-CM

## 2022-04-11 DIAGNOSIS — I21.3 ST ELEVATION MYOCARDIAL INFARCTION (STEMI), UNSPECIFIED ARTERY (H): Primary | ICD-10-CM

## 2022-04-11 DIAGNOSIS — F17.200 TOBACCO DEPENDENCE: ICD-10-CM

## 2022-04-11 DIAGNOSIS — E78.2 MIXED HYPERLIPIDEMIA: ICD-10-CM

## 2022-04-11 DIAGNOSIS — Z95.5 S/P DRUG ELUTING CORONARY STENT PLACEMENT: ICD-10-CM

## 2022-04-11 DIAGNOSIS — I10 ESSENTIAL HYPERTENSION: ICD-10-CM

## 2022-04-11 PROCEDURE — 99215 OFFICE O/P EST HI 40 MIN: CPT | Performed by: NURSE PRACTITIONER

## 2022-04-11 RX ORDER — NITROGLYCERIN 0.4 MG/1
0.4 TABLET SUBLINGUAL EVERY 5 MIN PRN
Qty: 30 TABLET | Refills: 1 | Status: SHIPPED | OUTPATIENT
Start: 2022-04-11

## 2022-04-11 NOTE — PATIENT INSTRUCTIONS
Martell Patel,    It was a pleasure to see you today at the [facility].     My recommendations after this visit include:    - No medications changes made today    - Please seek medical attention if you develop recurrent chest pain or shortness of breath or similar symptoms you experienced prior to recent cardiac event    - You may take Tylenol as needed for  knee pain. Avoid taking >3000 mg/24 hours to prevent liver injury.     - Cardiac rehab as scheduled    - Follow up with Dr. Solis as scheduled in May    - Please call [phone], if you have any questions or concerns    Rebecca Candelario CNP    Medication     Take all your medications as prescribed  Do not stop any medications without talking with a healthcare provider    Exercise      Physical activity is important for overall health  Set a goal of 150 minutes of exercise each week  For example, 30 minutes of exercise 5 days each week.    These 30 minutes can be broken into shorter periods of 15 minutes twice daily or 10 minutes three times daily  Start any exercise program slowly and work towards the goal of 150 minutes each week  For example, you may start with 10 minutes and plan to add a few minutes each week as you get stronger   Examples of exercise include walking, swimming, or biking  Remember to stretch and stay hydrated with exercise    Diet     A heart healthy diet includes:  A variety of fruits and vegetables  Whole grains  Low-fat dairy (fat-free, 1% fat, and low-fat)  Lean meats and poultry without skin   Fish (eat fish 2 times each week)  Nuts  Limit saturated fat to about 13 grams each day (based on a 2000 calorie diet)  Limit red meat  Limit sugars (sweets and sugary beverages)  Limit your portion sizes  Do not add salt to your food when cooking or at the table  Limit alcohol intake (no more than 1 drink each day for women or 2 drinks each day for men)    Weight Loss     Work on losing weight with diet and  exercise  You BMI (body mass index) should be between 18.5-24.9  This is a calculation of your weight and height  Please ask your healthcare provider for your BMI    Manage Other Chronic Health Conditions     Control cholesterol  Eat a diet low in saturated fat  Exercise   Take a statin medication as prescribed  Manage blood pressure  Eat a diet low in sodium  Exercise  Reduce stress  Lose weight   Take blood pressure medications as prescribed  Control blood sugars if diabetic  Monitor sugars and carbohydrates in your diet  Lose weight   Take diabetes medications as prescribed  Follow-up with your primary care provider to make sure your blood sugars are well controlled    Stress Reduction     Find time each day to relax  Reading, listening to music, yoga, meditation, exercise, spending time with friends and family, volunteering   Get 6-8 hours of sleep each night    Smoking Cessation     Smoking causes numerous health problems including coronary artery disease  It is never too late to quit  Set realistic goals for quitting  Decrease the number of cigarettes used each week  Use nicotine gum or patches to help you quit    Information from the [employer].  Please visit their website at www.heart.org

## 2022-04-11 NOTE — LETTER
4/11/2022    Isela Jay MD  [place] [address]  [place] [address]    RE: Martell Patel       Dear Colleague,     I had the pleasure of seeing Martell Patel in the [facility].          Assessment/Recommendations   Assessment:    1.  Coronary artery disease with status post PCI to RCA in 2016: Recent hospitalization with STEMI.  Coronary angiogram showed 99% stenosis in distal RCA and high-grade circumflex lesion in RPDA.  Distal RCA was successfully treated with a drug-eluting stent.  Due to the lesion being too small, RPDA could not be stented and therefore recommended conservative management.    On dual antiplatelet therapy with ASA 81 mg indefinitely and Ticagrelor (Brilinta) 90 mg twice a dayfor 12 months.  Patient denies any     Cardiac rehab has been initiated.    Reviewed most recent BMP, Hgb, platelet- stable.    2.  Dyslipidemia with LDL goal <70/Obesity with a BMI of: Martell Patel is on high intensity statin therapy with atorvastatin 80 mg daily. Most recent LDL is 72 unsure this medicine as it is not a treatment member for INR.  Most recent AST/ALT are stable except ALT 69 in March.    3.  Hypertension: His blood pressure is 162/76 and recheck was 138/70. Currently on lisinopril 10 mg daily, Metroprolol succinate 25 mg daily was started in the hospital.  Lisinopril dose was reduced in the hospital due to soft blood pressure.  Norvasc, hydrochlorothiazide, and Imdur were discontinued for same reason.  Patient is also on potassium chloride 20 mEq twice a day.  Potassium 4.4 on 3/31/2020.    Blood pressure was 120/74 at PCP office.  Reviewed cardiac rehab blood pressure readings-stable.    4.  Tobacco abuse: Patient continues to smoke although he has cut back.  He is using nicotine gum.  He had some rash from nicotine patch.  History of intolerance to Chantix and Wellbutrin.    5.  Mild thrombocytopenia: Recent platelet level 142.  Denies bleeding  complications.    6.  Mild cognitive impairment: Followed up with PCP in April and also neurology in March.  He has driving test at the end of this month.    Plan:  - We discussed the importance of antiplatelet therapy and talking with his cardiologist prior to stopping these medications for any reason.  We discussed about utilization of as needed nitroglycerin.     - Encouraged to seek medical attention if recurrent chest pain or shortness of breath.    - Risk factor modification and lifestyle management topics were discussed including managing comorbidities, weight loss, heart healthy diet, exercise, smoking cessation and stress reduction.      - Cardiac rehab as scheduled/schedule cardiac rehab    - We discussed a diet low in saturated fat, weight loss, and exercise along with medication for better control of cholesterol.  Highly encouraged to participate in nutrition class in cardiac rehab.    - Continue current hypertension regimen.  Patient was encouraged to call PCP or primary cardiology if blood pressure continues to remain elevated greater than 130/80 in cardiac rehab.    -Instructed to take Tylenol as needed for knee pain before exercising or going for walk.    Follow up with Dr. Solis as scheduled in May.      History of Present Illness/Subjective    Mr. Martell Patel is a 72 year old male with a past medical history of hypertension, hyperlipidemia, smoker, sleep apnea, chronic inflammatory demyelinating polyneuropathy, ataxia, and coronary artery disease with status post PCI to RCA in 2016 who is seen at [facility] for post coronary intervention follow up.     Patient was hospitalized from March 23 through March 25 with chest pressure associated with nausea improved with sublingual nitroglycerin.  Patient was diagnosed with STEMI.  Coronary angiogram showed 99% stenosis in distal RCA and 100% occlusion in RPDA.  Distal RCA was successfully treated with a  drug-eluting stent.  Due to risk of being too small, unable to put stent and therefore recommended conservative management.  Echocardiogram showed preserved LVEF 55 to 60% with no wall motion abnormalities.    Today, Martell is here accompanied by his wife.  He denies fatigue, lightheadedness, shortness of breath, dyspnea on exertion, orthopnea, PND, palpitations, chest pain, abdominal fullness/bloating and lower extremity edema.  He has some issue with his gait instability because of the history of polyneuropathy.  He was on ibuprofen for knee pain and was taken off in the hospital.  He is using topical analgesic for his right knee pain.  His exercise and physical activity is limited from arthritic pain.    Coronary Angiogram done on 3/23/2022: reviewed:  Conclusion    Inferior STEMI in the setting of RPDA occlusion. There was also a 95% lesion in the distal RCA. Previously placed stent in the mid RCA is patent with mild in-stent restenosis. The RCA has moderate diffuse disease.    Status post successful PCI of the distal RCA lesion using a 3.0 x 16 mm Synergy drug-eluting stent.    With wiring of the lesion, the clot in the RPDA was embolized further distally. Due to the lesion in the RPDA being very distal and this being a very small vessel at this point, it could not be stented and we elected to proceed with conservative management.    Nonobstructive coronary atherosclerosis in the left coronary system.    Ost Cx to Prox Cx lesion is 20% stenosed.    1st Mrg lesion is 30% stenosed.     Plan    Follow bedrest per protocol    Continued medical management and lifestyle modifications for cardiovascular risk factor optimizations.    Arterial sheath removed from femoral artery with closure device.    Femoral angiogram identifies arterial sheath placement suitable for closure device.    Discontinue tobacco smokingDiscontinue smoking    Cardiac rehabilitation.    Admit to inpatient    Continuation of dual antiplatelet  therapy for 12 months   Post antiplatelet therapy of    Aspirin; give 81 mg qd .     Ticagrelor; give 180 mg now and 90 mg BID.      Continue high dose statin therapy  -Continue Integrilin infusion for 12 hours post PCI.  Aspirin and Brilinta for dual antiplatelet therapy.       ECHO done on 3/23/2022-Reviewed:   Interpretation Summary  Technically difficult study. Poor acoustic windows.  Global and regional left ventricular function is normal with an EF of 55-60%.  Global right ventricular function is normal. The right ventricle is normal  size.  No significant valvular abnormalities.  No pericardial effusion is present.  There is no prior study for direct comparison     Physical Examination Review of Systems   /70   Pulse 76   Resp 16   Wt 87.1 kg (192 lb)   BMI 30.07 kg/m    Body mass index is 30.07 kg/m .  Wt Readings from Last 3 Encounters:   04/11/22 87.1 kg (192 lb)   03/25/22 85.2 kg (187 lb 14.4 oz)   11/19/21 88.7 kg (195 lb 9.6 oz)     General Appearance:   no distress, normal body habitus   ENT/Mouth: membranes moist, no oral lesions or bleeding gums.      EYES:  no scleral icterus, normal conjunctivae   Neck: no carotid bruits or thyromegaly   Chest/Lungs:   lungs are clear to auscultation, no rales or wheezing, equal chest wall expansion    Cardiovascular:   Heart rate regular. Normal first and second heart sounds with no murmurs, rubs, or gallops; the carotid, radial and posterior tibial pulses are intact, Jugular venous pressure fflat with no edema bilaterally    Abdomen:  no organomegaly, masses, bruits, or tenderness; bowel sounds are present   Extremities  Puncture Site: no cyanosis or clubbing  Right femoral site intact. Radial pulses and Pedal pulses intact and symmetrical.  CMS intact.   Skin: no xanthelasma, warm.    Neurologic: normal  bilateral, no tremors   Psychiatric: alert and oriented x3, calm              Negative unless noted in HPI     Medical History  Surgical History  "Family History Social History   Past Medical History:   Diagnosis Date     Arthritis      Ataxia      Chest pain      CIDP (chronic inflammatory demyelinating polyneuropathy) (H)     Remnant is that his sense of balance is impaired, particularly at night.     Coronary artery disease      HLD (hyperlipidemia)      HTN (hypertension)      Hyperlipidemia      Hypertension      Hypertension      Sleep apnea     Past Surgical History:   Procedure Laterality Date     APPENDECTOMY       APPENDECTOMY       CARDIAC CATHETERIZATION  12/06/2016     CARDIAC CATHETERIZATION N/A 12/6/2016    Procedure: Coronary Angiogram;  Surgeon: Duane Gandhi MD;  Location: [facility];  Service:      CORONARY STENT PLACEMENT  12/06/2016    [place] to RCA.     CV CORONARY ANGIOGRAM N/A 3/23/2022    Procedure: Coronary Angiogram;  Surgeon: Castillo Benoit MD;  Location: [facility] CARDIAC CATH LAB     CV PCI N/A 3/23/2022    Procedure: Percutaneous Coronary Intervention;  Surgeon: Castillo Benoit MD;  Location: [facility] CARDIAC CATH LAB     ENT SURGERY      tonsilectomy     ORTHOPEDIC SURGERY       REVISION AMPUTATION OF FINGER      Top of right pointer finger - cut on      THROAT SURGERY      \"To remove some polyps in my throat.  No, they weren't cancer.\"     TONSILLECTOMY       US BIOPSY FINE NEEDLE ASPIRATION LYMPH NODE (BREAST) LEFT Left 7/6/2021    Family History   Problem Relation Age of Onset     Coronary Artery Disease Brother      Coronary Artery Disease Brother     Social History     Socioeconomic History     Marital status:      Spouse name: Not on file     Number of children: Not on file     Years of education: Not on file     Highest education level: Not on file   Occupational History     Not on file   Tobacco Use     Smoking status: Current Every Day Smoker     Packs/day: 1.50     Years: 50.00     Pack years: 75.00     Smokeless tobacco: Never Used   Substance and Sexual Activity     Alcohol use: " No     Comment: Alcoholic Drinks/day: No alcohol since 1978     Drug use: No     Sexual activity: Not on file   Other Topics Concern     Not on file   Social History Narrative     Not on file     Social Determinants of Health     Financial Resource Strain: Not on file   Food Insecurity: Not on file   Transportation Needs: Not on file   Physical Activity: Not on file   Stress: Not on file   Social Connections: Not on file   Intimate Partner Violence: Not on file   Housing Stability: Not on file          Medications  Allergies   Current Outpatient Medications   Medication Sig Dispense Refill     aspirin (ASA) 81 MG EC tablet Take 1 tablet (81 mg) by mouth daily Start tomorrow. 90 tablet 3     atorvastatin (LIPITOR) 80 MG tablet Take 80 mg by mouth At Bedtime       fluticasone (FLONASE) 50 MCG/ACT nasal spray USE 1-2 SPRAYS INTO EACH NOSTRIL ONCE DAILY       lisinopril (ZESTRIL) 10 MG tablet Take 1 tablet (10 mg) by mouth daily 90 tablet 3     metoprolol succinate ER (TOPROL-XL) 25 MG 24 hr tablet Take 1 tablet (25 mg) by mouth daily 90 tablet 3     nitroGLYcerin (NITROSTAT) 0.4 MG sublingual tablet Place 1 tablet (0.4 mg) under the tongue every 5 minutes as needed for chest pain For chest pain place 1 tablet under the tongue every 5 minutes for 3 doses. If symptoms persist 5 minutes after 1st dose call 911. 30 tablet 1     potassium chloride ER (KLOR-CON M) 20 MEQ CR tablet Take 1 tablet (20 mEq) by mouth 2 times daily 90 tablet 3     ticagrelor (BRILINTA) 90 MG tablet Take 1 tablet (90 mg) by mouth in the morning and 1 tablet (90 mg) in the evening. 180 tablet 3    Allergies   Allergen Reactions     Wellbutrin [Bupropion] Other (See Comments)     Nightmare     Varenicline      Other reaction(s): night bhakta         Lab Results    Chemistry/lipid CBC Cardiac Enzymes/BNP/TSH/INR   Lab Results   Component Value Date    CHOL 138 03/23/2022    HDL 57 03/23/2022    TRIG 45 03/23/2022    BUN 15 03/31/2022      03/31/2022    CO2 24 03/31/2022    Lab Results   Component Value Date    WBC 6.9 03/24/2022    HGB 14.7 03/24/2022    HCT 43.2 03/24/2022    MCV 87 03/24/2022     (L) 03/24/2022    Lab Results   Component Value Date    TSH 3.25 03/31/2022    INR 1.02 03/23/2022        46 minutes spent on the date of encounter doing chart review, review of outside records, review of test results, interpretation with above tests, patient visit, documentation and discussion with family.        This note has been dictated using voice recognition software. Any grammatical, typographical, or context distortions are unintentional and inherent to the software          Thank you for allowing me to participate in the care of your patient.      Sincerely,     ROSE Flaherty [facility]  cc:   Pablito Solis DO  [address] [place] [address]

## 2022-04-12 ENCOUNTER — HOSPITAL ENCOUNTER (OUTPATIENT)
Dept: CARDIAC REHAB | Facility: HOSPITAL | Age: 72
Discharge: HOME | End: 2022-04-12
Attending: INTERNAL MEDICINE
Payer: COMMERCIAL

## 2022-04-12 PROCEDURE — 93798 PHYS/QHP OP CAR RHAB W/ECG: CPT

## 2022-04-14 ENCOUNTER — HOSPITAL ENCOUNTER (OUTPATIENT)
Dept: CARDIAC REHAB | Facility: HOSPITAL | Age: 72
Discharge: HOME | End: 2022-04-14
Attending: STUDENT IN AN ORGANIZED HEALTH CARE EDUCATION/TRAINING PROGRAM
Payer: COMMERCIAL

## 2022-04-14 ENCOUNTER — HOSPITAL ENCOUNTER (OUTPATIENT)
Dept: CARDIAC REHAB | Facility: HOSPITAL | Age: 72
Discharge: HOME | End: 2022-04-14
Attending: INTERNAL MEDICINE
Payer: COMMERCIAL

## 2022-04-14 PROCEDURE — 93797 PHYS/QHP OP CAR RHAB WO ECG: CPT

## 2022-04-14 PROCEDURE — 93798 PHYS/QHP OP CAR RHAB W/ECG: CPT

## 2022-04-19 ENCOUNTER — HOSPITAL ENCOUNTER (OUTPATIENT)
Dept: CARDIAC REHAB | Facility: HOSPITAL | Age: 72
Discharge: HOME | End: 2022-04-19
Attending: INTERNAL MEDICINE
Payer: COMMERCIAL

## 2022-04-19 PROCEDURE — 93798 PHYS/QHP OP CAR RHAB W/ECG: CPT

## 2022-04-21 ENCOUNTER — HOSPITAL ENCOUNTER (OUTPATIENT)
Dept: CARDIAC REHAB | Facility: HOSPITAL | Age: 72
Discharge: HOME | End: 2022-04-21
Attending: INTERNAL MEDICINE
Payer: COMMERCIAL

## 2022-04-21 PROCEDURE — 93798 PHYS/QHP OP CAR RHAB W/ECG: CPT

## 2022-04-26 ENCOUNTER — HOSPITAL ENCOUNTER (OUTPATIENT)
Dept: CARDIAC REHAB | Facility: HOSPITAL | Age: 72
Discharge: HOME | End: 2022-04-26
Attending: INTERNAL MEDICINE
Payer: COMMERCIAL

## 2022-04-26 PROCEDURE — 93798 PHYS/QHP OP CAR RHAB W/ECG: CPT

## 2022-05-03 ENCOUNTER — HOSPITAL ENCOUNTER (OUTPATIENT)
Dept: CARDIAC REHAB | Facility: HOSPITAL | Age: 72
Discharge: HOME | End: 2022-05-03
Attending: INTERNAL MEDICINE
Payer: COMMERCIAL

## 2022-05-03 PROCEDURE — 93798 PHYS/QHP OP CAR RHAB W/ECG: CPT

## 2022-05-05 ENCOUNTER — HOSPITAL ENCOUNTER (OUTPATIENT)
Dept: CARDIAC REHAB | Facility: HOSPITAL | Age: 72
Discharge: HOME | End: 2022-05-05
Attending: INTERNAL MEDICINE
Payer: COMMERCIAL

## 2022-05-05 PROCEDURE — 93798 PHYS/QHP OP CAR RHAB W/ECG: CPT

## 2022-05-10 ENCOUNTER — HOSPITAL ENCOUNTER (OUTPATIENT)
Dept: CARDIAC REHAB | Facility: HOSPITAL | Age: 72
Discharge: HOME | End: 2022-05-10
Attending: INTERNAL MEDICINE
Payer: COMMERCIAL

## 2022-05-10 PROCEDURE — 93798 PHYS/QHP OP CAR RHAB W/ECG: CPT

## 2022-05-12 ENCOUNTER — HOSPITAL ENCOUNTER (OUTPATIENT)
Dept: CARDIAC REHAB | Facility: HOSPITAL | Age: 72
Discharge: HOME | End: 2022-05-12
Attending: INTERNAL MEDICINE
Payer: COMMERCIAL

## 2022-05-12 PROCEDURE — 93798 PHYS/QHP OP CAR RHAB W/ECG: CPT

## 2022-05-13 ENCOUNTER — OFFICE VISIT (OUTPATIENT)
Dept: CARDIOLOGY | Facility: CLINIC | Age: 72
End: 2022-05-13
Attending: INTERNAL MEDICINE
Payer: COMMERCIAL

## 2022-05-13 VITALS
WEIGHT: 189 LBS | SYSTOLIC BLOOD PRESSURE: 150 MMHG | RESPIRATION RATE: 12 BRPM | BODY MASS INDEX: 29.6 KG/M2 | HEART RATE: 65 BPM | DIASTOLIC BLOOD PRESSURE: 78 MMHG

## 2022-05-13 DIAGNOSIS — I21.11 ST ELEVATION MYOCARDIAL INFARCTION INVOLVING RIGHT CORONARY ARTERY (H): ICD-10-CM

## 2022-05-13 DIAGNOSIS — I25.10 CORONARY ARTERY DISEASE INVOLVING NATIVE CORONARY ARTERY OF NATIVE HEART WITHOUT ANGINA PECTORIS: Primary | ICD-10-CM

## 2022-05-13 DIAGNOSIS — E78.2 MIXED HYPERLIPIDEMIA: ICD-10-CM

## 2022-05-13 DIAGNOSIS — F17.200 TOBACCO DEPENDENCE: ICD-10-CM

## 2022-05-13 DIAGNOSIS — Z95.5 S/P DRUG ELUTING CORONARY STENT PLACEMENT: ICD-10-CM

## 2022-05-13 DIAGNOSIS — I10 ESSENTIAL HYPERTENSION: ICD-10-CM

## 2022-05-13 PROCEDURE — 99214 OFFICE O/P EST MOD 30 MIN: CPT | Performed by: INTERNAL MEDICINE

## 2022-05-13 NOTE — LETTER
5/13/2022    Isela Jay MD  [facility] [address]  [place] [address]    RE: Martell Patel       Dear Colleague,     I had the [facility] of seeing Martell [facility].    HEART CARE ENCOUNTER CONSULTATON NOTE      [facility]  [phone]      Assessment/Recommendations   Assessment:    1. Coronary artery disease status post mid RCA drug eluting stent (Synergy 3.5 x 16m), recent acute myocardial infarction with ST elevation from acute thrombosis of his right PDA.  Mild in-stent restenosis of the RCA stent was noted.  2. Hyperlipidemia, LDL: 72  LDL Cholesterol Calculated   Date Value Ref Range Status   03/23/2022 72 <=100 mg/dL Final   3. Essential Hypertension, controlled at cardiac rehab and with exercise.  Elevated today due to stress.  4. History of sleep apnea.    5. Tobacco Abuse   6.  Seasonal allergies     Plan:  1.  Patient will discuss with primary care provider about smoking sensation medications.  He would like to consider Wellbutrin.  2.  Continue cardiac rehab as tolerated.  He has significant pain in his right knee which is related to acute knee injury.   3.  Continue Brilinta 90 mg twice daily for 1 year  4. Continue atorvastatin to 80 mg daily for HLD and CAD.   5. Continue ASA 81 mg daily, lifelong.  6. Continue amlodipine, HCTZ and lisinopril daily for HTN.     Today's clinic visit entailed:  Review of prior external note(s) from - CareEverywhere information from Dr. Jay reviewed  Review of the result(s) of each unique test - Cath, echo  The following tests were independently interpreted by me as noted in my documentation: ECG  Prescription drug management  The level of medical decision making during this visit was of moderate complexity.         History of Present Illness/Subjective    HPI: Martell Patel is a 71 year old male coronary artery disease, hypertension, hyperlipidemia, active smoker presents to cardiology  clinic in follow-up.    Since last evaluation the patient did present with an acute myocardial infarction in March 23, 2022.  He developed cute onset of chest pain.  Presented to the emergency department.  There was noted that he had ST elevated myocardial infarction transfer the emergency department to the coronary Cath Lab underwent emergent coronary intervention for thrombosis of his right PDA.  Echocardiogram during the procedure demonstrated normal left ventricular function no regional wall motion abnormality.  No evidence of congestive heart failure.    He was seen by our nurse practitioner after discharge.  Was doing well after the procedure.    Currently enrolled in cardiac rehab but having difficulty due to right knee pain and swelling of the knee joint itself.  He has scheduled appointment with orthopedic surgical team but will not be seen until July.  They will reach out to the orthopedic team to see if they can get a more urgent follow-up.      Currently denies any orthopnea symptoms.  Denies any anginal chest pain.  No bleeding issues to his dual antiplatelet therapy.    Outlined his echocardiogram in detail with the patient.    Continues to have anxiety related to being in [place].      Patient is having difficulty with discontinuation of smoking.  He is not doing well with the use of nicotine patch.  He plans to discuss antidepressant with his primary care provider.  Feel Wellbutrin could be a reasonable option.  He does not want to use Chantix as he had difficulties with vivid memories from [place].    ECG: EKG reviewed from March 23, 2022.  Serial EKGs were reviewed personally.  First EKG demonstrated ST elevations in the inferior leads with reciprocal change in the lateral leads.  Repeat EKG after revascularization demonstrated resolution of ST elevations.  There was T wave inversions in the inferior leads after revascularization.    Echocardiogram Results: 3/23   Interpretation  Summary  Technically difficult study. Poor acoustic windows.  Global and regional left ventricular function is normal with an EF of 55-60%.  Global right ventricular function is normal. The right ventricle is normal  size.  No significant valvular abnormalities.  No pericardial effusion is present.  There is no prior study for direct comparison.      Coronary Angiogram: 3/23    Inferior STEMI in the setting of RPDA occlusion. There was also a 95% lesion in the distal RCA. Previously placed stent in the mid RCA is patent with mild in-stent restenosis. The RCA has moderate diffuse disease.    Status post successful PCI of the distal RCA lesion using a 3.0 x 16 mm Synergy drug-eluting stent.    With wiring of the lesion, the clot in the RPDA was embolized further distally. Due to the lesion in the RPDA being very distal and this being a very small vessel at this point, it could not be stented and we elected to proceed with conservative management.    Nonobstructive coronary atherosclerosis in the left coronary system.    Ost Cx to Prox Cx lesion is 20% stenosed.    1st Mrg lesion is 30% stenosed.            Physical Examination  Review of Systems   Vitals: There were no vitals taken for this visit.  BMI= There is no height or weight on file to calculate BMI.  Wt Readings from Last 3 Encounters:   04/11/22 87.1 kg (192 lb)   03/25/22 85.2 kg (187 lb 14.4 oz)   11/19/21 88.7 kg (195 lb 9.6 oz)           ENT/Mouth: membranes moist, no oral lesions or bleeding gums.      EYES:  no scleral icterus, normal conjunctivae       Chest/Lungs:   lungs are clear to auscultation, no rales or wheezing, no sternal scar, equal chest wall expansion    Cardiovascular:   Regular. Normal first and second heart sounds with no murmurs, rubs, or gallops; the carotid, radial and posterior tibial pulses are intact, Jugular venous pressure noraml, no pitting edema bilaterally    Abdomen:  no tenderness; bowel sounds are present   Extremities: no  "cyanosis or clubbing   Skin: no xanthelasma, warm.    Neurologic: normal  bilateral, no tremors     Psychiatric: alert and oriented x3, calm        Please refer above for cardiac ROS details.        Medical History  Surgical History Family History Social History   Past Medical History:   Diagnosis Date     Arthritis      Ataxia      Chest pain      CIDP (chronic inflammatory demyelinating polyneuropathy) (H)     Remnant is that his sense of balance is impaired, particularly at night.     Coronary artery disease      HLD (hyperlipidemia)      HTN (hypertension)      Hyperlipidemia      Hypertension      Hypertension      Sleep apnea      Past Surgical History:   Procedure Laterality Date     APPENDECTOMY       APPENDECTOMY       CARDIAC CATHETERIZATION  12/06/2016     CARDIAC CATHETERIZATION N/A 12/6/2016    Procedure: Coronary Angiogram;  Surgeon: Duane Gandhi MD;  Location: [facility] Lab;  Service:      CORONARY STENT PLACEMENT  12/06/2016    [place] to RCA.     CV CORONARY ANGIOGRAM N/A 3/23/2022    Procedure: Coronary Angiogram;  Surgeon: Castillo Benoit MD;  Location: [facility] CARDIAC CATH LAB     CV PCI N/A 3/23/2022    Procedure: Percutaneous Coronary Intervention;  Surgeon: Castillo Benoit MD;  Location: [facility] CARDIAC CATH LAB     ENT SURGERY      tonsilectomy     ORTHOPEDIC SURGERY       REVISION AMPUTATION OF FINGER      Top of right pointer finger - cut on      THROAT SURGERY      \"To remove some polyps in my throat.  No, they weren't cancer.\"     TONSILLECTOMY       US BIOPSY FINE NEEDLE ASPIRATION LYMPH NODE (BREAST) LEFT Left 7/6/2021     Family History   Problem Relation Age of Onset     Coronary Artery Disease Brother      Coronary Artery Disease Brother         Social History     Socioeconomic History     Marital status:      Spouse name: Not on file     Number of children: Not on file     Years of education: Not on file     Highest education level: Not on " file   Occupational History     Not on file   Tobacco Use     Smoking status: Current Every Day Smoker     Packs/day: 1.50     Years: 50.00     Pack years: 75.00     Smokeless tobacco: Never Used   Substance and Sexual Activity     Alcohol use: No     Comment: Alcoholic Drinks/day: No alcohol since 1978     Drug use: No     Sexual activity: Not on file   Other Topics Concern     Not on file   Social History Narrative     Not on file     Social Determinants of Health     Financial Resource Strain: Not on file   Food Insecurity: Not on file   Transportation Needs: Not on file   Physical Activity: Not on file   Stress: Not on file   Social Connections: Not on file   Intimate Partner Violence: Not on file   Housing Stability: Not on file           Medications  Allergies   Current Outpatient Medications   Medication Sig Dispense Refill     aspirin (ASA) 81 MG EC tablet Take 1 tablet (81 mg) by mouth daily Start tomorrow. 90 tablet 3     atorvastatin (LIPITOR) 80 MG tablet Take 80 mg by mouth At Bedtime       fluticasone (FLONASE) 50 MCG/ACT nasal spray USE 1-2 SPRAYS INTO EACH NOSTRIL ONCE DAILY       lisinopril (ZESTRIL) 10 MG tablet Take 1 tablet (10 mg) by mouth daily 90 tablet 3     metoprolol succinate ER (TOPROL-XL) 25 MG 24 hr tablet Take 1 tablet (25 mg) by mouth daily 90 tablet 3     nitroGLYcerin (NITROSTAT) 0.4 MG sublingual tablet Place 1 tablet (0.4 mg) under the tongue every 5 minutes as needed for chest pain For chest pain place 1 tablet under the tongue every 5 minutes for 3 doses. If symptoms persist 5 minutes after 1st dose call 911. 30 tablet 1     potassium chloride ER (KLOR-CON M) 20 MEQ CR tablet Take 1 tablet (20 mEq) by mouth 2 times daily 90 tablet 3     ticagrelor (BRILINTA) 90 MG tablet Take 1 tablet (90 mg) by mouth in the morning and 1 tablet (90 mg) in the evening. 180 tablet 3       Allergies   Allergen Reactions     Wellbutrin [Bupropion] Other (See Comments)     Nightmare     Varenicline       Other reaction(s): night bhakta          Lab Results    Chemistry/lipid CBC Cardiac Enzymes/BNP/TSH/INR   Recent Labs   Lab Test 06/02/21  1028   CHOL 142   HDL 51   LDL 79   TRIG 62     Recent Labs   Lab Test 06/02/21  1028 03/03/20  1217 09/10/18  0751   LDL 79 81 69     Recent Labs   Lab Test 07/19/21  1346      POTASSIUM 3.8   CHLORIDE 98   CO2 28   *   BUN 16   CR 1.03   GFRESTIMATED 72   TIMOTHY 9.2     Recent Labs   Lab Test 07/19/21  1346 06/02/21  1028 11/23/20  1005   CR 1.03 1.00 1.04     No results for input(s): A1C in the last 26121 hours.       No results for input(s): WBC, HGB, HCT, MCV, PLT in the last 84324 hours.  No results for input(s): HGB in the last 83406 hours. No results for input(s): TROPONINI in the last 91848 hours.  No results for input(s): BNP, NTBNPI, NTBNP in the last 83619 hours.  Recent Labs   Lab Test 09/10/18  0751   TSH 2.29     No results for input(s): INR in the last 88171 hours.     Pablito Solis DO    Thank you for allowing me to participate in the care of your patient.      Sincerely,   Pablito Solis DO   [facility]  cc: Pablito Solis DO  [address] [place] [address]

## 2022-05-13 NOTE — PROGRESS NOTES
HEART CARE ENCOUNTER CONSULTATON NOTE      [facility]  [phone]      Assessment/Recommendations   Assessment:    1. Coronary artery disease status post mid RCA drug eluting stent (Synergy 3.5 x 16m), recent acute myocardial infarction with ST elevation from acute thrombosis of his right PDA.  Mild in-stent restenosis of the RCA stent was noted.  2. Hyperlipidemia, LDL: 72  LDL Cholesterol Calculated   Date Value Ref Range Status   03/23/2022 72 <=100 mg/dL Final   3. Essential Hypertension, controlled at cardiac rehab and with exercise.  Elevated today due to stress.  4. History of sleep apnea.    5. Tobacco Abuse   6.  Seasonal allergies     Plan:  1.  Patient will discuss with primary care provider about smoking sensation medications.  He would like to consider Wellbutrin.  2.  Continue cardiac rehab as tolerated.  He has significant pain in his right knee which is related to acute knee injury.   3.  Continue Brilinta 90 mg twice daily for 1 year  4. Continue atorvastatin to 80 mg daily for HLD and CAD.   5. Continue ASA 81 mg daily, lifelong.  6. Continue amlodipine, HCTZ and lisinopril daily for HTN.     Today's clinic visit entailed:  [facility]  Review of the result(s) of each unique test - Cath, echo  The following tests were independently interpreted by me as noted in my documentation: ECG  Prescription drug management  The level of medical decision making during this visit was of moderate complexity.         History of Present Illness/Subjective    HPI: Martell Patel is a 71 year old male coronary artery disease, hypertension, hyperlipidemia, active smoker presents to cardiology clinic in follow-up.    Since last evaluation the patient did present with an acute myocardial infarction in March 23, 2022.  He developed cute onset of chest pain.  Presented to the emergency department.  There was noted that  he had ST elevated myocardial infarction transfer the emergency department to the coronary Cath Lab underwent emergent coronary intervention for thrombosis of his right PDA.  Echocardiogram during the procedure demonstrated normal left ventricular function no regional wall motion abnormality.  No evidence of congestive heart failure.    He was seen by our nurse practitioner after discharge.  Was doing well after the procedure.    Currently enrolled in cardiac rehab but having difficulty due to right knee pain and swelling of the knee joint itself.  He has scheduled appointment with orthopedic surgical team but will not be seen until July.  They will reach out to the orthopedic team to see if they can get a more urgent follow-up.      Currently denies any orthopnea symptoms.  Denies any anginal chest pain.  No bleeding issues to his dual antiplatelet therapy.    Outlined his echocardiogram in detail with the patient.    Continues to have anxiety related to being in [place].      Patient is having difficulty with discontinuation of smoking.  He is not doing well with the use of nicotine patch.  He plans to discuss antidepressant with his primary care provider.  Feel Wellbutrin could be a reasonable option.  He does not want to use Chantix as he had difficulties with vivid memories from [place].    ECG: EKG reviewed from March 23, 2022.  Serial EKGs were reviewed personally.  First EKG demonstrated ST elevations in the inferior leads with reciprocal change in the lateral leads.  Repeat EKG after revascularization demonstrated resolution of ST elevations.  There was T wave inversions in the inferior leads after revascularization.    Echocardiogram Results: 3/23   Interpretation Summary  Technically difficult study. Poor acoustic windows.  Global and regional left ventricular function is normal with an EF of 55-60%.  Global right ventricular function is normal. The right ventricle is normal  size.  No significant  valvular abnormalities.  No pericardial effusion is present.  There is no prior study for direct comparison.      Coronary Angiogram: 3/23  Inferior STEMI in the setting of RPDA occlusion. There was also a 95% lesion in the distal RCA. Previously placed stent in the mid RCA is patent with mild in-stent restenosis. The RCA has moderate diffuse disease.  Status post successful PCI of the distal RCA lesion using a 3.0 x 16 mm Synergy drug-eluting stent.  With wiring of the lesion, the clot in the RPDA was embolized further distally. Due to the lesion in the RPDA being very distal and this being a very small vessel at this point, it could not be stented and we elected to proceed with conservative management.  Nonobstructive coronary atherosclerosis in the left coronary system.  Ost Cx to Prox Cx lesion is 20% stenosed.  1st Mrg lesion is 30% stenosed.            Physical Examination  Review of Systems   Vitals: There were no vitals taken for this visit.  BMI= There is no height or weight on file to calculate BMI.  Wt Readings from Last 3 Encounters:   04/11/22 87.1 kg (192 lb)   03/25/22 85.2 kg (187 lb 14.4 oz)   11/19/21 88.7 kg (195 lb 9.6 oz)           ENT/Mouth: membranes moist, no oral lesions or bleeding gums.      EYES:  no scleral icterus, normal conjunctivae       Chest/Lungs:   lungs are clear to auscultation, no rales or wheezing, no sternal scar, equal chest wall expansion    Cardiovascular:   Regular. Normal first and second heart sounds with no murmurs, rubs, or gallops; the carotid, radial and posterior tibial pulses are intact, Jugular venous pressure noraml, no pitting edema bilaterally    Abdomen:  no tenderness; bowel sounds are present   Extremities: no cyanosis or clubbing   Skin: no xanthelasma, warm.    Neurologic: normal  bilateral, no tremors     Psychiatric: alert and oriented x3, calm        Please refer above for cardiac ROS details.        Medical History  Surgical History Family  "History Social History   Past Medical History:   Diagnosis Date     Arthritis      Ataxia      Chest pain      CIDP (chronic inflammatory demyelinating polyneuropathy) (H)     Remnant is that his sense of balance is impaired, particularly at night.     Coronary artery disease      HLD (hyperlipidemia)      HTN (hypertension)      Hyperlipidemia      Hypertension      Hypertension      Sleep apnea      Past Surgical History:   Procedure Laterality Date     APPENDECTOMY       APPENDECTOMY       CARDIAC CATHETERIZATION  12/06/2016     CARDIAC CATHETERIZATION N/A 12/6/2016    Procedure: Coronary Angiogram;  Surgeon: Duane Gandhi MD;  Location: [facility] Cath Lab;  Service:      CORONARY STENT PLACEMENT  12/06/2016    [place] to RCA.     CV CORONARY ANGIOGRAM N/A 3/23/2022    Procedure: Coronary Angiogram;  Surgeon: Castillo Benoit MD;  Location: [facility] CARDIAC CATH LAB     CV PCI N/A 3/23/2022    Procedure: Percutaneous Coronary Intervention;  Surgeon: Castillo Benoit MD;  Location: [facility] CARDIAC CATH LAB     ENT SURGERY      tonsilectomy     ORTHOPEDIC SURGERY       REVISION AMPUTATION OF FINGER      Top of right pointer finger - cut on      THROAT SURGERY      \"To remove some polyps in my throat.  No, they weren't cancer.\"     TONSILLECTOMY       US BIOPSY FINE NEEDLE ASPIRATION LYMPH NODE (BREAST) LEFT Left 7/6/2021     Family History   Problem Relation Age of Onset     Coronary Artery Disease Brother      Coronary Artery Disease Brother         Social History     Socioeconomic History     Marital status:      Spouse name: Not on file     Number of children: Not on file     Years of education: Not on file     Highest education level: Not on file   Occupational History     Not on file   Tobacco Use     Smoking status: Current Every Day Smoker     Packs/day: 1.50     Years: 50.00     Pack years: 75.00     Smokeless tobacco: Never Used   Substance and Sexual Activity     Alcohol use: " No     Comment: Alcoholic Drinks/day: No alcohol since 1978     Drug use: No     Sexual activity: Not on file   Other Topics Concern     Not on file   Social History Narrative     Not on file     Social Determinants of Health     Financial Resource Strain: Not on file   Food Insecurity: Not on file   Transportation Needs: Not on file   Physical Activity: Not on file   Stress: Not on file   Social Connections: Not on file   Intimate Partner Violence: Not on file   Housing Stability: Not on file           Medications  Allergies   Current Outpatient Medications   Medication Sig Dispense Refill     aspirin (ASA) 81 MG EC tablet Take 1 tablet (81 mg) by mouth daily Start tomorrow. 90 tablet 3     atorvastatin (LIPITOR) 80 MG tablet Take 80 mg by mouth At Bedtime       fluticasone (FLONASE) 50 MCG/ACT nasal spray USE 1-2 SPRAYS INTO EACH NOSTRIL ONCE DAILY       lisinopril (ZESTRIL) 10 MG tablet Take 1 tablet (10 mg) by mouth daily 90 tablet 3     metoprolol succinate ER (TOPROL-XL) 25 MG 24 hr tablet Take 1 tablet (25 mg) by mouth daily 90 tablet 3     nitroGLYcerin (NITROSTAT) 0.4 MG sublingual tablet Place 1 tablet (0.4 mg) under the tongue every 5 minutes as needed for chest pain For chest pain place 1 tablet under the tongue every 5 minutes for 3 doses. If symptoms persist 5 minutes after 1st dose call 911. 30 tablet 1     potassium chloride ER (KLOR-CON M) 20 MEQ CR tablet Take 1 tablet (20 mEq) by mouth 2 times daily 90 tablet 3     ticagrelor (BRILINTA) 90 MG tablet Take 1 tablet (90 mg) by mouth in the morning and 1 tablet (90 mg) in the evening. 180 tablet 3       Allergies   Allergen Reactions     Wellbutrin [Bupropion] Other (See Comments)     Nightmare     Varenicline      Other reaction(s): night bhakta          Lab Results    Chemistry/lipid CBC Cardiac Enzymes/BNP/TSH/INR   Recent Labs   Lab Test 06/02/21  1028   CHOL 142   HDL 51   LDL 79   TRIG 62     Recent Labs   Lab Test 06/02/21  1028 03/03/20  1217  09/10/18  0751   LDL 79 81 69     Recent Labs   Lab Test 07/19/21  1346      POTASSIUM 3.8   CHLORIDE 98   CO2 28   *   BUN 16   CR 1.03   GFRESTIMATED 72   TIMOTHY 9.2     Recent Labs   Lab Test 07/19/21  1346 06/02/21  1028 11/23/20  1005   CR 1.03 1.00 1.04     No results for input(s): A1C in the last 97490 hours.       No results for input(s): WBC, HGB, HCT, MCV, PLT in the last 18985 hours.  No results for input(s): HGB in the last 00841 hours. No results for input(s): TROPONINI in the last 55538 hours.  No results for input(s): BNP, NTBNPI, NTBNP in the last 97208 hours.  Recent Labs   Lab Test 09/10/18  0751   TSH 2.29     No results for input(s): INR in the last 94833 hours.     Pablito Solis, DO

## 2022-05-13 NOTE — PATIENT INSTRUCTIONS
It was a pleasure to meet with you today in clinic.  Please do not hesitate to call the [facility] clinic with any questions or concerns at [phone].    Additional Provider Instructions:   Consider Wellbutrin

## 2022-05-19 ENCOUNTER — HOSPITAL ENCOUNTER (OUTPATIENT)
Dept: CARDIAC REHAB | Facility: HOSPITAL | Age: 72
Discharge: HOME | End: 2022-05-19
Attending: INTERNAL MEDICINE
Payer: COMMERCIAL

## 2022-05-19 PROCEDURE — 93798 PHYS/QHP OP CAR RHAB W/ECG: CPT

## 2022-05-24 ENCOUNTER — HOSPITAL ENCOUNTER (OUTPATIENT)
Dept: CARDIAC REHAB | Facility: HOSPITAL | Age: 72
Discharge: HOME | End: 2022-05-24
Attending: INTERNAL MEDICINE
Payer: COMMERCIAL

## 2022-05-24 PROCEDURE — 93798 PHYS/QHP OP CAR RHAB W/ECG: CPT

## 2022-05-26 ENCOUNTER — HOSPITAL ENCOUNTER (OUTPATIENT)
Dept: CARDIAC REHAB | Facility: HOSPITAL | Age: 72
Discharge: HOME | End: 2022-05-26
Attending: INTERNAL MEDICINE
Payer: COMMERCIAL

## 2022-05-26 PROCEDURE — 93798 PHYS/QHP OP CAR RHAB W/ECG: CPT

## 2022-05-31 ENCOUNTER — HOSPITAL ENCOUNTER (OUTPATIENT)
Dept: CARDIAC REHAB | Facility: HOSPITAL | Age: 72
Discharge: HOME | End: 2022-05-31
Attending: INTERNAL MEDICINE
Payer: COMMERCIAL

## 2022-05-31 PROCEDURE — 93798 PHYS/QHP OP CAR RHAB W/ECG: CPT

## 2022-06-02 ENCOUNTER — HOSPITAL ENCOUNTER (OUTPATIENT)
Dept: CARDIAC REHAB | Facility: HOSPITAL | Age: 72
Discharge: HOME | End: 2022-06-02
Attending: INTERNAL MEDICINE
Payer: COMMERCIAL

## 2022-06-02 PROCEDURE — 93798 PHYS/QHP OP CAR RHAB W/ECG: CPT

## 2022-06-14 ENCOUNTER — HOSPITAL ENCOUNTER (OUTPATIENT)
Dept: CARDIAC REHAB | Facility: HOSPITAL | Age: 72
Discharge: HOME | End: 2022-06-14
Attending: INTERNAL MEDICINE
Payer: COMMERCIAL

## 2022-06-14 PROCEDURE — 93798 PHYS/QHP OP CAR RHAB W/ECG: CPT

## 2022-06-16 ENCOUNTER — HOSPITAL ENCOUNTER (OUTPATIENT)
Dept: CARDIAC REHAB | Facility: HOSPITAL | Age: 72
Discharge: HOME | End: 2022-06-16
Attending: INTERNAL MEDICINE
Payer: COMMERCIAL

## 2022-06-16 PROCEDURE — 93798 PHYS/QHP OP CAR RHAB W/ECG: CPT

## 2022-06-21 ENCOUNTER — HOSPITAL ENCOUNTER (OUTPATIENT)
Dept: CARDIAC REHAB | Facility: HOSPITAL | Age: 72
Discharge: HOME | End: 2022-06-21
Attending: INTERNAL MEDICINE
Payer: COMMERCIAL

## 2022-06-21 PROCEDURE — 93798 PHYS/QHP OP CAR RHAB W/ECG: CPT

## 2022-06-23 ENCOUNTER — HOSPITAL ENCOUNTER (OUTPATIENT)
Dept: CARDIAC REHAB | Facility: HOSPITAL | Age: 72
Discharge: HOME | End: 2022-06-23
Attending: INTERNAL MEDICINE
Payer: COMMERCIAL

## 2022-06-23 PROCEDURE — 93798 PHYS/QHP OP CAR RHAB W/ECG: CPT

## 2022-06-28 ENCOUNTER — HOSPITAL ENCOUNTER (OUTPATIENT)
Dept: CARDIAC REHAB | Facility: HOSPITAL | Age: 72
Discharge: HOME | End: 2022-06-28
Attending: INTERNAL MEDICINE
Payer: COMMERCIAL

## 2022-06-28 PROCEDURE — 93798 PHYS/QHP OP CAR RHAB W/ECG: CPT

## 2022-06-30 ENCOUNTER — HOSPITAL ENCOUNTER (OUTPATIENT)
Dept: CARDIAC REHAB | Facility: HOSPITAL | Age: 72
Discharge: HOME | End: 2022-06-30
Attending: INTERNAL MEDICINE
Payer: COMMERCIAL

## 2022-06-30 PROCEDURE — 93798 PHYS/QHP OP CAR RHAB W/ECG: CPT

## 2022-07-05 ENCOUNTER — HOSPITAL ENCOUNTER (OUTPATIENT)
Dept: CARDIAC REHAB | Facility: HOSPITAL | Age: 72
Discharge: HOME | End: 2022-07-05
Attending: INTERNAL MEDICINE
Payer: COMMERCIAL

## 2022-07-05 PROCEDURE — 93798 PHYS/QHP OP CAR RHAB W/ECG: CPT

## 2022-07-07 ENCOUNTER — HOSPITAL ENCOUNTER (OUTPATIENT)
Dept: CARDIAC REHAB | Facility: HOSPITAL | Age: 72
Discharge: HOME | End: 2022-07-07
Attending: INTERNAL MEDICINE
Payer: COMMERCIAL

## 2022-07-07 PROCEDURE — 93798 PHYS/QHP OP CAR RHAB W/ECG: CPT

## 2022-07-12 ENCOUNTER — HOSPITAL ENCOUNTER (OUTPATIENT)
Dept: CARDIAC REHAB | Facility: HOSPITAL | Age: 72
Discharge: HOME | End: 2022-07-12
Attending: INTERNAL MEDICINE
Payer: COMMERCIAL

## 2022-07-12 PROCEDURE — 93798 PHYS/QHP OP CAR RHAB W/ECG: CPT

## 2022-07-14 ENCOUNTER — HOSPITAL ENCOUNTER (OUTPATIENT)
Dept: CARDIAC REHAB | Facility: HOSPITAL | Age: 72
Discharge: HOME | End: 2022-07-14
Attending: INTERNAL MEDICINE
Payer: COMMERCIAL

## 2022-07-14 PROCEDURE — 93798 PHYS/QHP OP CAR RHAB W/ECG: CPT

## 2022-07-19 ENCOUNTER — HOSPITAL ENCOUNTER (OUTPATIENT)
Dept: CARDIAC REHAB | Facility: HOSPITAL | Age: 72
Discharge: HOME | End: 2022-07-19
Attending: INTERNAL MEDICINE
Payer: COMMERCIAL

## 2022-07-19 PROCEDURE — 93798 PHYS/QHP OP CAR RHAB W/ECG: CPT

## 2022-07-21 ENCOUNTER — HOSPITAL ENCOUNTER (OUTPATIENT)
Dept: CARDIAC REHAB | Facility: HOSPITAL | Age: 72
Discharge: HOME | End: 2022-07-21
Attending: INTERNAL MEDICINE
Payer: COMMERCIAL

## 2022-07-21 PROCEDURE — 93798 PHYS/QHP OP CAR RHAB W/ECG: CPT

## 2022-07-26 ENCOUNTER — HOSPITAL ENCOUNTER (OUTPATIENT)
Dept: CARDIAC REHAB | Facility: HOSPITAL | Age: 72
Discharge: HOME | End: 2022-07-26
Attending: INTERNAL MEDICINE
Payer: COMMERCIAL

## 2022-07-26 PROCEDURE — 93798 PHYS/QHP OP CAR RHAB W/ECG: CPT

## 2022-07-28 ENCOUNTER — HOSPITAL ENCOUNTER (OUTPATIENT)
Dept: CARDIAC REHAB | Facility: HOSPITAL | Age: 72
Discharge: HOME | End: 2022-07-28
Attending: INTERNAL MEDICINE
Payer: COMMERCIAL

## 2022-07-28 PROCEDURE — 93798 PHYS/QHP OP CAR RHAB W/ECG: CPT

## 2022-08-04 ENCOUNTER — HOSPITAL ENCOUNTER (OUTPATIENT)
Dept: CARDIAC REHAB | Facility: HOSPITAL | Age: 72
Discharge: HOME | End: 2022-08-04
Attending: INTERNAL MEDICINE
Payer: COMMERCIAL

## 2022-08-04 PROCEDURE — 93798 PHYS/QHP OP CAR RHAB W/ECG: CPT

## 2022-08-09 ENCOUNTER — HOSPITAL ENCOUNTER (OUTPATIENT)
Dept: CARDIAC REHAB | Facility: HOSPITAL | Age: 72
Discharge: HOME | End: 2022-08-09
Attending: INTERNAL MEDICINE
Payer: COMMERCIAL

## 2022-08-09 PROCEDURE — 93798 PHYS/QHP OP CAR RHAB W/ECG: CPT

## 2022-08-11 ENCOUNTER — HOSPITAL ENCOUNTER (OUTPATIENT)
Dept: CARDIAC REHAB | Facility: HOSPITAL | Age: 72
Discharge: HOME | End: 2022-08-11
Attending: INTERNAL MEDICINE
Payer: COMMERCIAL

## 2022-08-11 PROCEDURE — 93797 PHYS/QHP OP CAR RHAB WO ECG: CPT

## 2022-08-11 PROCEDURE — 93798 PHYS/QHP OP CAR RHAB W/ECG: CPT

## 2022-08-15 ENCOUNTER — OFFICE VISIT (OUTPATIENT)
Dept: CARDIOLOGY | Facility: CLINIC | Age: 72
End: 2022-08-15
Attending: INTERNAL MEDICINE
Payer: COMMERCIAL

## 2022-08-15 VITALS
SYSTOLIC BLOOD PRESSURE: 146 MMHG | DIASTOLIC BLOOD PRESSURE: 80 MMHG | HEART RATE: 68 BPM | RESPIRATION RATE: 16 BRPM | WEIGHT: 188 LBS | BODY MASS INDEX: 29.51 KG/M2 | HEIGHT: 67 IN

## 2022-08-15 DIAGNOSIS — I21.3 ST ELEVATION MYOCARDIAL INFARCTION (STEMI), UNSPECIFIED ARTERY (H): ICD-10-CM

## 2022-08-15 DIAGNOSIS — E78.2 MIXED HYPERLIPIDEMIA: ICD-10-CM

## 2022-08-15 DIAGNOSIS — I25.10 CORONARY ARTERY DISEASE INVOLVING NATIVE CORONARY ARTERY OF NATIVE HEART WITHOUT ANGINA PECTORIS: Primary | ICD-10-CM

## 2022-08-15 DIAGNOSIS — I21.11 ST ELEVATION MYOCARDIAL INFARCTION INVOLVING RIGHT CORONARY ARTERY (H): ICD-10-CM

## 2022-08-15 DIAGNOSIS — I10 ESSENTIAL HYPERTENSION: ICD-10-CM

## 2022-08-15 PROCEDURE — 99214 OFFICE O/P EST MOD 30 MIN: CPT | Performed by: INTERNAL MEDICINE

## 2022-08-15 RX ORDER — LISINOPRIL 20 MG/1
20 TABLET ORAL DAILY
Qty: 90 TABLET | Refills: 3 | Status: SHIPPED | OUTPATIENT
Start: 2022-08-15 | End: 2022-11-28

## 2022-08-15 NOTE — PROGRESS NOTES
HEART CARE ENCOUNTER CONSULTATON NOTE      [facility]  [phone]      Assessment/Recommendations   Assessment:    1. Coronary artery disease status post mid RCA drug eluting stent (Synergy 3.5 x 16m), Myocardial infarction with ST elevation from acute thrombosis of his right PDA (March 2022).  Mild in-stent restenosis of the RCA stent was noted.  2. Hyperlipidemia, LDL: 72  LDL Cholesterol Calculated   Date Value Ref Range Status   03/23/2022 72 <=100 mg/dL Final   3. Essential Hypertension, elevated.   4. History of sleep apnea.    5. Tobacco Abuse   6.  Seasonal allergies  7.    LDL Cholesterol Calculated   Date Value Ref Range Status   03/23/2022 72 <=100 mg/dL Final      Plan:  1.  Increase lisinopril to 20 mg daily for HTN.    2.  Continue cardiac rehab as tolerated.  He has significant pain in his right knee which is related to acute knee injury.   3.  Continue Brilinta 90 mg twice daily for 1 year  4. Continue atorvastatin to 80 mg daily for HLD and CAD.   5. Continue ASA 81 mg daily, lifelong.  6. Plan for knee surgery in late March or April 2023.         History of Present Illness/Subjective    HPI: Martell Patel is a 71 year old male coronary artery disease, hypertension, hyperlipidemia, active smoker presents to cardiology clinic in follow-up.    Currently patient is doing well from a cardiac standpoint.  He has no anginal chest pain or dyspnea on exertion that has been new.  Continues to deal with right knee issues which is resulting in difficulty with his rehab and overall quality of life.Within 1 year from coronary stenting which would be March 2023 for knee replacement.  He is being followed by orthopedic surgery.    Reviewed outpatient cardiac rehab notes.  Reviewed blood pressures during rehab which have generally been in systolics above 140.  Increasing lisinopril today which communicated the patient.      Continues to have anxiety related to being in [place] and [facility]  [facility]      Echocardiogram Results: 3/23   Interpretation Summary  Global and regional left ventricular function is normal with an EF of 55-60%.  Global right ventricular function is normal. The right ventricle is normal  size.  No significant valvular abnormalities.  No pericardial effusion is present.  There is no prior study for direct comparison.      Coronary Angiogram: 3/23  Inferior STEMI in the setting of RPDA occlusion. There was also a 95% lesion in the distal RCA. Previously placed stent in the mid RCA is patent with mild in-stent restenosis. The RCA has moderate diffuse disease.  Status post successful PCI of the distal RCA lesion using a 3.0 x 16 mm Synergy drug-eluting stent.  With wiring of the lesion, the clot in the RPDA was embolized further distally. Due to the lesion in the RPDA being very distal and this being a very small vessel at this point, it could not be stented and we elected to proceed with conservative management.  Nonobstructive coronary atherosclerosis in the left coronary system.  Ost Cx to Prox Cx lesion is 20% stenosed.  1st Mrg lesion is 30% stenosed.            Physical Examination  Review of Systems   Vitals: BP (!) 146/80 (BP Location: Left arm, Patient Position: Sitting, Cuff Size: Adult Regular)   Pulse 68   Resp 16   Ht 1.702 m (5' 7\")   Wt 85.3 kg (188 lb)   BMI 29.44 kg/m    BMI= Body mass index is 29.44 kg/m .  Wt Readings from Last 3 Encounters:   08/15/22 85.3 kg (188 lb)   05/13/22 85.7 kg (189 lb)   04/11/22 87.1 kg (192 lb)           ENT/Mouth: membranes moist, no oral lesions or bleeding gums.      EYES:  no scleral icterus, normal conjunctivae       Chest/Lungs:   lungs are clear to auscultation, no rales or wheezing,.    Cardiovascular:   Regular. Normal first and second heart sounds with no murmurs. d, radial and posterior tibial pulses are intact, Jugular venous pressure noraml, no pitting edema bilaterally    Abdomen:  no tenderness; bowel sounds are " "present   Extremities: no cyanosis or clubbing   Skin: no xanthelasma, warm.    Neurologic: normal  bilateral, no tremors     Psychiatric: alert and oriented x3, calm        Please refer above for cardiac ROS details.        Medical History  Surgical History Family History Social History   Past Medical History:   Diagnosis Date     Arthritis      Ataxia      Chest pain      CIDP (chronic inflammatory demyelinating polyneuropathy) (H)     Remnant is that his sense of balance is impaired, particularly at night.     Coronary artery disease      HLD (hyperlipidemia)      HTN (hypertension)      Hyperlipidemia      Hypertension      Hypertension      Sleep apnea      Past Surgical History:   Procedure Laterality Date     APPENDECTOMY       APPENDECTOMY       CARDIAC CATHETERIZATION  12/06/2016     CARDIAC CATHETERIZATION N/A 12/6/2016    Procedure: Coronary Angiogram;  Surgeon: Duane Ganhdi MD;  Location: [facility] Cath Lab;  Service:      CORONARY STENT PLACEMENT  12/06/2016    [place] to RCA.     CV CORONARY ANGIOGRAM N/A 3/23/2022    Procedure: Coronary Angiogram;  Surgeon: Castillo Benoit MD;  Location: [facility] CARDIAC CATH LAB     CV PCI N/A 3/23/2022    Procedure: Percutaneous Coronary Intervention;  Surgeon: Castillo Benoit MD;  Location: [facility] CARDIAC CATH LAB     ENT SURGERY      tonsilectomy     ORTHOPEDIC SURGERY       REVISION AMPUTATION OF FINGER      Top of right pointer finger - cut on      THROAT SURGERY      \"To remove some polyps in my throat.  No, they weren't cancer.\"     TONSILLECTOMY       US BIOPSY FINE NEEDLE ASPIRATION LYMPH NODE (BREAST) LEFT Left 7/6/2021     Family History   Problem Relation Age of Onset     Coronary Artery Disease Brother      Coronary Artery Disease Brother         Social History     Socioeconomic History     Marital status:      Spouse name: Not on file     Number of children: Not on file     Years of education: Not on file     Highest " education level: Not on file   Occupational History     Not on file   Tobacco Use     Smoking status: Current Every Day Smoker     Packs/day: 1.50     Years: 50.00     Pack years: 75.00     Smokeless tobacco: Never Used   Substance and Sexual Activity     Alcohol use: No     Comment: Alcoholic Drinks/day: No alcohol since 1978     Drug use: No     Sexual activity: Not on file   Other Topics Concern     Not on file   Social History Narrative     Not on file     Social Determinants of Health     Financial Resource Strain: Not on file   Food Insecurity: Not on file   Transportation Needs: Not on file   Physical Activity: Not on file   Stress: Not on file   Social Connections: Not on file   Intimate Partner Violence: Not on file   Housing Stability: Not on file           Medications  Allergies   Current Outpatient Medications   Medication Sig Dispense Refill     aspirin (ASA) 81 MG EC tablet Take 1 tablet (81 mg) by mouth daily Start tomorrow. 90 tablet 3     atorvastatin (LIPITOR) 80 MG tablet Take 80 mg by mouth At Bedtime       fluticasone (FLONASE) 50 MCG/ACT nasal spray USE 1-2 SPRAYS INTO EACH NOSTRIL ONCE DAILY       lisinopril (ZESTRIL) 10 MG tablet Take 1 tablet (10 mg) by mouth daily 90 tablet 3     metoprolol succinate ER (TOPROL-XL) 25 MG 24 hr tablet Take 1 tablet (25 mg) by mouth daily 90 tablet 3     nitroGLYcerin (NITROSTAT) 0.4 MG sublingual tablet Place 1 tablet (0.4 mg) under the tongue every 5 minutes as needed for chest pain For chest pain place 1 tablet under the tongue every 5 minutes for 3 doses. If symptoms persist 5 minutes after 1st dose call 911. 30 tablet 1     potassium chloride ER (KLOR-CON M) 20 MEQ CR tablet Take 1 tablet (20 mEq) by mouth 2 times daily 90 tablet 3     ticagrelor (BRILINTA) 90 MG tablet Take 1 tablet (90 mg) by mouth in the morning and 1 tablet (90 mg) in the evening. 180 tablet 3       Allergies   Allergen Reactions     Wellbutrin [Bupropion] Other (See Comments)      Nightmare     Varenicline      Other reaction(s): night bhakta          Lab Results    Chemistry/lipid CBC Cardiac Enzymes/BNP/TSH/INR   Recent Labs   Lab Test 06/02/21  1028   CHOL 142   HDL 51   LDL 79   TRIG 62     Recent Labs   Lab Test 06/02/21  1028 03/03/20  1217 09/10/18  0751   LDL 79 81 69     Recent Labs   Lab Test 07/19/21  1346      POTASSIUM 3.8   CHLORIDE 98   CO2 28   *   BUN 16   CR 1.03   GFRESTIMATED 72   TIMOTHY 9.2     Recent Labs   Lab Test 07/19/21  1346 06/02/21  1028 11/23/20  1005   CR 1.03 1.00 1.04     No results for input(s): A1C in the last 70873 hours.       No results for input(s): WBC, HGB, HCT, MCV, PLT in the last 41499 hours.  No results for input(s): HGB in the last 04229 hours. No results for input(s): TROPONINI in the last 32224 hours.  No results for input(s): BNP, NTBNPI, NTBNP in the last 68195 hours.  Recent Labs   Lab Test 09/10/18  0751   TSH 2.29     No results for input(s): INR in the last 57017 hours.     Pablito Solis, DO

## 2022-08-15 NOTE — LETTER
8/15/2022    Isela Jay MD  [place] [id number]  [place] [address]    RE: Martell Patel       Dear Colleague,     I had the pleasure of seeing Martell Patel in the [facility].    HEART CARE ENCOUNTER CONSULTATON NOTE      [facility]  [phone]      Assessment/Recommendations   Assessment:    1. Coronary artery disease status post mid RCA drug eluting stent (Synergy 3.5 x 16m), Myocardial infarction with ST elevation from acute thrombosis of his right PDA (March 2022).  Mild in-stent restenosis of the RCA stent was noted.  2. Hyperlipidemia, LDL: 72  LDL Cholesterol Calculated   Date Value Ref Range Status   03/23/2022 72 <=100 mg/dL Final   3. Essential Hypertension, elevated.   4. History of sleep apnea.    5. Tobacco Abuse   6.  Seasonal allergies  7.    LDL Cholesterol Calculated   Date Value Ref Range Status   03/23/2022 72 <=100 mg/dL Final      Plan:  1.  Increase lisinopril to 20 mg daily for HTN.    2.  Continue cardiac rehab as tolerated.  He has significant pain in his right knee which is related to acute knee injury.   3.  Continue Brilinta 90 mg twice daily for 1 year  4. Continue atorvastatin to 80 mg daily for HLD and CAD.   5. Continue ASA 81 mg daily, lifelong.  6. Plan for knee surgery in late March or April 2023.         History of Present Illness/Subjective    HPI: Martell Patel is a 71 year old male coronary artery disease, hypertension, hyperlipidemia, active smoker presents to cardiology clinic in follow-up.    Currently patient is doing well from a cardiac standpoint.  He has no anginal chest pain or dyspnea on exertion that has been new.  Continues to deal with right knee issues which is resulting in difficulty with his rehab and overall quality of life.Within 1 year from coronary stenting which would be March 2023 for knee replacement.  He is being followed by orthopedic surgery.    Reviewed outpatient cardiac rehab  "notes.  Reviewed blood pressures during rehab which have generally been in systolics above 140.  Increasing lisinopril today which communicated the patient.      Continues to have anxiety related to being in [place] and [facility]      Echocardiogram Results: 3/23   Interpretation Summary  Global and regional left ventricular function is normal with an EF of 55-60%.  Global right ventricular function is normal. The right ventricle is normal  size.  No significant valvular abnormalities.  No pericardial effusion is present.  There is no prior study for direct comparison.      Coronary Angiogram: 3/23    Inferior STEMI in the setting of RPDA occlusion. There was also a 95% lesion in the distal RCA. Previously placed stent in the mid RCA is patent with mild in-stent restenosis. The RCA has moderate diffuse disease.    Status post successful PCI of the distal RCA lesion using a 3.0 x 16 mm Synergy drug-eluting stent.    With wiring of the lesion, the clot in the RPDA was embolized further distally. Due to the lesion in the RPDA being very distal and this being a very small vessel at this point, it could not be stented and we elected to proceed with conservative management.    Nonobstructive coronary atherosclerosis in the left coronary system.    Ost Cx to Prox Cx lesion is 20% stenosed.    1st Mrg lesion is 30% stenosed.            Physical Examination  Review of Systems   Vitals: BP (!) 146/80 (BP Location: Left arm, Patient Position: Sitting, Cuff Size: Adult Regular)   Pulse 68   Resp 16   Ht 1.702 m (5' 7\")   Wt 85.3 kg (188 lb)   BMI 29.44 kg/m    BMI= Body mass index is 29.44 kg/m .  Wt Readings from Last 3 Encounters:   08/15/22 85.3 kg (188 lb)   05/13/22 85.7 kg (189 lb)   04/11/22 87.1 kg (192 lb)           ENT/Mouth: membranes moist, no oral lesions or bleeding gums.      EYES:  no scleral icterus, normal conjunctivae       Chest/Lungs:   lungs are clear to auscultation, no rales or wheezing,.  " "  Cardiovascular:   Regular. Normal first and second heart sounds with no murmurs. d, radial and posterior tibial pulses are intact, Jugular venous pressure noraml, no pitting edema bilaterally    Abdomen:  no tenderness; bowel sounds are present   Extremities: no cyanosis or clubbing   Skin: no xanthelasma, warm.    Neurologic: normal  bilateral, no tremors     Psychiatric: alert and oriented x3, calm        Please refer above for cardiac ROS details.        Medical History  Surgical History Family History Social History   Past Medical History:   Diagnosis Date     Arthritis      Ataxia      Chest pain      CIDP (chronic inflammatory demyelinating polyneuropathy) (H)     Remnant is that his sense of balance is impaired, particularly at night.     Coronary artery disease      HLD (hyperlipidemia)      HTN (hypertension)      Hyperlipidemia      Hypertension      Hypertension      Sleep apnea      Past Surgical History:   Procedure Laterality Date     APPENDECTOMY       APPENDECTOMY       CARDIAC CATHETERIZATION  12/06/2016     CARDIAC CATHETERIZATION N/A 12/6/2016    Procedure: Coronary Angiogram;  Surgeon: Duane Gandhi MD;  Location: Health system Cath Lab;  Service:      CORONARY STENT PLACEMENT  12/06/2016    [place] to RCA.     CV CORONARY ANGIOGRAM N/A 3/23/2022    Procedure: Coronary Angiogram;  Surgeon: Castillo Benoit MD;  Location: [facility] CARDIAC CATH LAB     CV PCI N/A 3/23/2022    Procedure: Percutaneous Coronary Intervention;  Surgeon: Castillo Benoit MD;  Location: [facility] CARDIAC CATH LAB     ENT SURGERY      tonsilectomy     ORTHOPEDIC SURGERY       REVISION AMPUTATION OF FINGER      Top of right pointer finger - cut on      THROAT SURGERY      \"To remove some polyps in my throat.  No, they weren't cancer.\"     TONSILLECTOMY       US BIOPSY FINE NEEDLE ASPIRATION LYMPH NODE (BREAST) LEFT Left 7/6/2021     Family History   Problem Relation Age of Onset     Coronary Artery " Disease Brother      Coronary Artery Disease Brother         Social History     Socioeconomic History     Marital status:      Spouse name: Not on file     Number of children: Not on file     Years of education: Not on file     Highest education level: Not on file   Occupational History     Not on file   Tobacco Use     Smoking status: Current Every Day Smoker     Packs/day: 1.50     Years: 50.00     Pack years: 75.00     Smokeless tobacco: Never Used   Substance and Sexual Activity     Alcohol use: No     Comment: Alcoholic Drinks/day: No alcohol since 1978     Drug use: No     Sexual activity: Not on file   Other Topics Concern     Not on file   Social History Narrative     Not on file     Social Determinants of Health     Financial Resource Strain: Not on file   Food Insecurity: Not on file   Transportation Needs: Not on file   Physical Activity: Not on file   Stress: Not on file   Social Connections: Not on file   Intimate Partner Violence: Not on file   Housing Stability: Not on file           Medications  Allergies   Current Outpatient Medications   Medication Sig Dispense Refill     aspirin (ASA) 81 MG EC tablet Take 1 tablet (81 mg) by mouth daily Start tomorrow. 90 tablet 3     atorvastatin (LIPITOR) 80 MG tablet Take 80 mg by mouth At Bedtime       fluticasone (FLONASE) 50 MCG/ACT nasal spray USE 1-2 SPRAYS INTO EACH NOSTRIL ONCE DAILY       lisinopril (ZESTRIL) 10 MG tablet Take 1 tablet (10 mg) by mouth daily 90 tablet 3     metoprolol succinate ER (TOPROL-XL) 25 MG 24 hr tablet Take 1 tablet (25 mg) by mouth daily 90 tablet 3     nitroGLYcerin (NITROSTAT) 0.4 MG sublingual tablet Place 1 tablet (0.4 mg) under the tongue every 5 minutes as needed for chest pain For chest pain place 1 tablet under the tongue every 5 minutes for 3 doses. If symptoms persist 5 minutes after 1st dose call 911. 30 tablet 1     potassium chloride ER (KLOR-CON M) 20 MEQ CR tablet Take 1 tablet (20 mEq) by mouth 2 times  daily 90 tablet 3     ticagrelor (BRILINTA) 90 MG tablet Take 1 tablet (90 mg) by mouth in the morning and 1 tablet (90 mg) in the evening. 180 tablet 3       Allergies   Allergen Reactions     Wellbutrin [Bupropion] Other (See Comments)     Nightmare     Varenicline      Other reaction(s): night bhakta          Lab Results    Chemistry/lipid CBC Cardiac Enzymes/BNP/TSH/INR   Recent Labs   Lab Test 06/02/21  1028   CHOL 142   HDL 51   LDL 79   TRIG 62     Recent Labs   Lab Test 06/02/21  1028 03/03/20  1217 09/10/18  0751   LDL 79 81 69     Recent Labs   Lab Test 07/19/21  1346      POTASSIUM 3.8   CHLORIDE 98   CO2 28   *   BUN 16   CR 1.03   GFRESTIMATED 72   TIMOTHY 9.2     Recent Labs   Lab Test 07/19/21  1346 06/02/21  1028 11/23/20  1005   CR 1.03 1.00 1.04     No results for input(s): A1C in the last 71992 hours.       No results for input(s): WBC, HGB, HCT, MCV, PLT in the last 16048 hours.  No results for input(s): HGB in the last 40613 hours. No results for input(s): TROPONINI in the last 59803 hours.  No results for input(s): BNP, NTBNPI, NTBNP in the last 32501 hours.  Recent Labs   Lab Test 09/10/18  0751   TSH 2.29     No results for input(s): INR in the last 44253 hours.     Pablito Solis DO    Thank you for allowing me to participate in the care of your patient.      Sincerely,     Pablito Solis DO     [facility]    cc:   Pablito Solis DO  [address] [place] [address]

## 2022-09-13 ENCOUNTER — DOCUMENTATION ONLY (OUTPATIENT)
Dept: OTHER | Facility: CLINIC | Age: 72
End: 2022-09-13

## 2022-09-21 ENCOUNTER — TELEPHONE (OUTPATIENT)
Dept: CARDIOLOGY | Facility: CLINIC | Age: 72
End: 2022-09-21

## 2022-09-21 NOTE — TELEPHONE ENCOUNTER
[facility] Measures Blood Pressure guideline reviewed.  Patients recent blood pressure is outside of guideline parameters.  Called pt to review, no answer.  Left voicemail message asking patient to check their blood pressure using a home blood pressure cuff or by going to a [facility].  Patient instructed to then call [phone] ([facility]) and leave a message with their name, date of birth, and blood pressure reading that was completed within the last 24 hours and where it was completed.  Will await call back for further review.  MOISES Victor

## 2022-09-23 DIAGNOSIS — E78.2 MIXED HYPERLIPIDEMIA: Primary | ICD-10-CM

## 2022-09-23 RX ORDER — ATORVASTATIN CALCIUM 80 MG/1
80 TABLET, FILM COATED ORAL AT BEDTIME
Qty: 90 TABLET | Refills: 3 | Status: SHIPPED | OUTPATIENT
Start: 2022-09-23

## 2022-10-14 ENCOUNTER — TELEPHONE (OUTPATIENT)
Dept: CARDIOLOGY | Facility: CLINIC | Age: 72
End: 2022-10-14

## 2022-10-31 ENCOUNTER — TRANSFERRED RECORDS (OUTPATIENT)
Dept: HEALTH INFORMATION MANAGEMENT | Facility: CLINIC | Age: 72
End: 2022-10-31

## 2022-11-22 ENCOUNTER — TELEPHONE (OUTPATIENT)
Dept: CARDIOLOGY | Facility: CLINIC | Age: 72
End: 2022-11-22

## 2022-11-22 DIAGNOSIS — I25.10 CORONARY ARTERY DISEASE INVOLVING NATIVE CORONARY ARTERY OF NATIVE HEART WITHOUT ANGINA PECTORIS: ICD-10-CM

## 2022-11-22 DIAGNOSIS — I10 ESSENTIAL HYPERTENSION: ICD-10-CM

## 2022-11-22 NOTE — TELEPHONE ENCOUNTER
[place] Community Measures Blood Pressure [place] reviewed.  Patients recent blood pressure is outside of guideline parameters.  Called pt to review, no answer.  Left voicemail message asking patient to check their blood pressure using a home blood pressure cuff or by going to a [facility].  Patient instructed to then call [phone] ([facility]) and leave a message with their name, date of birth, and blood pressure reading that was completed within the last 24 hours and where it was completed.  Will await call back for further review.

## 2022-11-23 NOTE — TELEPHONE ENCOUNTER
Patient returned call and left voicemail message with update blood pressure reading.  BP still out of range and will forward to nurse.    Last Blood Pressure: 156/80  Last Heart Rate: N/A  Date: 10/14/22  Location: Home BP    Today's Blood Pressure: 163/90  Today's Heart Rate: N/A  Date:11/22/22  Location: Home BP    Patient reported blood pressure updated in Epic. Blood pressure continues to fall outside of the [place] Community Measures guidelines.  Message sent to primary cardiology team for further review.   Maddison Nicole MA

## 2022-11-25 NOTE — TELEPHONE ENCOUNTER
PC with patient and review. Patient is taking his medications as prescribed. Pt is still having right knee pain and utilizing Tylenol, if needed. Denies use of NSAIDS. Informed patient will send to MAT for review and any next steps for BP management. Pt verbalized understanding.     Dr. Solis please review any medication adjustments at this time? CMM,Rn

## 2022-11-28 RX ORDER — LISINOPRIL 40 MG/1
40 TABLET ORAL DAILY
Qty: 90 TABLET | Refills: 1 | Status: SHIPPED | OUTPATIENT
Start: 2022-11-28 | End: 2024-03-18

## 2022-11-28 NOTE — TELEPHONE ENCOUNTER
"Spoke with wife Sarah(consent to communicate on file), and reports that the patient has been starting to starr Alzheimer's. He has been having to make some decisions of giving up, giving things to family and make some life changes. Increased stress due to this. Review of recommendations. Wife helps and sets up his medications, and will increase the lisinopril as advised. Informed that the newest prescription on file will be the 40 mg tablet size. Discussion of sodium restriction, wife already home cooks, reduces salt, etc. Will continue to do so.     Of note wife reports that she takes\"super Beets gummies\" per her cardiologist to help with her veins, to increase/improve blood flow in the veins. She gives these to Martell to take also, as her cardiologist recommended them to her. Informed wife, that writer is not knowledgeable about \"super beets\" but will have MAT review. Request patient to return call and can review advisement from provider. Will check with MAT as to advisement of super beets.     Dr. Solis, any contraindications for patient to continue the supplement \"super beets\" ? Thank you FELIX,Rn   "

## 2022-11-28 NOTE — TELEPHONE ENCOUNTER
===View-only below this line===  ----- Message -----  From: Pablito Solis DO  Sent: 11/28/2022   9:02 AM CST  To: Jaime Isaacs RN    Would recommend patient increase lisinopril to 40 mg daily.  Make sure to reduce sodium intake to < 3000 mg daily.

## 2022-11-28 NOTE — TELEPHONE ENCOUNTER
===View-only below this line===  ----- Message -----  From: Pablito Solis DO  Sent: 11/28/2022  12:25 PM CST  To: Jaime Isaacs RN    No concerns about beet gummie.

## 2022-11-29 NOTE — TELEPHONE ENCOUNTER
PC with patient and wife, and review of recommendations. Pt and wife verbalized understanding. CMM,Rn

## 2022-12-14 ENCOUNTER — APPOINTMENT (OUTPATIENT)
Dept: CT IMAGING | Facility: HOSPITAL | Age: 72
End: 2022-12-14
Attending: EMERGENCY MEDICINE
Payer: COMMERCIAL

## 2022-12-14 ENCOUNTER — APPOINTMENT (OUTPATIENT)
Dept: RADIOLOGY | Facility: HOSPITAL | Age: 72
End: 2022-12-14
Attending: EMERGENCY MEDICINE
Payer: COMMERCIAL

## 2022-12-14 ENCOUNTER — HOSPITAL ENCOUNTER (EMERGENCY)
Facility: HOSPITAL | Age: 72
Discharge: HOME | End: 2022-12-14
Attending: EMERGENCY MEDICINE | Admitting: EMERGENCY MEDICINE
Payer: COMMERCIAL

## 2022-12-14 VITALS
BODY MASS INDEX: 27.28 KG/M2 | HEART RATE: 66 BPM | RESPIRATION RATE: 20 BRPM | HEIGHT: 68 IN | DIASTOLIC BLOOD PRESSURE: 101 MMHG | OXYGEN SATURATION: 94 % | TEMPERATURE: 97.8 F | SYSTOLIC BLOOD PRESSURE: 215 MMHG | WEIGHT: 180 LBS

## 2022-12-14 DIAGNOSIS — S63.259A FINGER DISLOCATION, INITIAL ENCOUNTER: ICD-10-CM

## 2022-12-14 DIAGNOSIS — S61.215A LACERATION OF LEFT RING FINGER WITHOUT FOREIGN BODY WITHOUT DAMAGE TO NAIL, INITIAL ENCOUNTER: ICD-10-CM
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
## 2022-12-14 LAB
ALBUMIN UR-MCNC: NEGATIVE MG/DL
ANION GAP SERPL CALCULATED.3IONS-SCNC: 10 MMOL/L (ref 7–15)
APPEARANCE UR: CLEAR
BILIRUB UR QL STRIP: NEGATIVE
BUN SERPL-MCNC: 19 MG/DL (ref 8–23)
CALCIUM SERPL-MCNC: 9 MG/DL (ref 8.8–10.2)
CHLORIDE SERPL-SCNC: 101 MMOL/L (ref 98–107)
COLOR UR AUTO: COLORLESS
CREAT SERPL-MCNC: 0.93 MG/DL (ref 0.67–1.17)
DEPRECATED HCO3 PLAS-SCNC: 24 MMOL/L (ref 22–29)
ERYTHROCYTE [DISTWIDTH] IN BLOOD BY AUTOMATED COUNT: 14 % (ref 10–15)
GFR SERPL CREATININE-BSD FRML MDRD: 86 ML/MIN/1.73M2
GLUCOSE SERPL-MCNC: 107 MG/DL (ref 70–99)
GLUCOSE UR STRIP-MCNC: NEGATIVE MG/DL
HCT VFR BLD AUTO: 50 % (ref 40–53)
HGB BLD-MCNC: 17.3 G/DL (ref 13.3–17.7)
HGB UR QL STRIP: ABNORMAL
INR PPP: 1.01 (ref 0.85–1.15)
KETONES UR STRIP-MCNC: NEGATIVE MG/DL
LEUKOCYTE ESTERASE UR QL STRIP: NEGATIVE
MCH RBC QN AUTO: 30.5 PG (ref 26.5–33)
MCHC RBC AUTO-ENTMCNC: 34.6 G/DL (ref 31.5–36.5)
MCV RBC AUTO: 88 FL (ref 78–100)
NITRATE UR QL: NEGATIVE
PH UR STRIP: 5.5 [PH] (ref 5–7)
PLATELET # BLD AUTO: 170 10E3/UL (ref 150–450)
POTASSIUM SERPL-SCNC: 3.8 MMOL/L (ref 3.4–5.3)
RBC # BLD AUTO: 5.68 10E6/UL (ref 4.4–5.9)
RBC URINE: 1 /HPF
SODIUM SERPL-SCNC: 135 MMOL/L (ref 136–145)
SP GR UR STRIP: 1.01 (ref 1–1.03)
TROPONIN T SERPL HS-MCNC: 23 NG/L
UROBILINOGEN UR STRIP-MCNC: <2 MG/DL
WBC # BLD AUTO: 10.7 10E3/UL (ref 4–11)
WBC URINE: <1 /HPF

## 2022-12-14 PROCEDURE — 99285 EMERGENCY DEPT VISIT HI MDM: CPT | Mod: 25

## 2022-12-14 PROCEDURE — 36415 COLL VENOUS BLD VENIPUNCTURE: CPT | Performed by: EMERGENCY MEDICINE

## 2022-12-14 PROCEDURE — 85610 PROTHROMBIN TIME: CPT | Performed by: EMERGENCY MEDICINE

## 2022-12-14 PROCEDURE — 73130 X-RAY EXAM OF HAND: CPT | Mod: LT

## 2022-12-14 PROCEDURE — 26770 TREAT FINGER DISLOCATION: CPT | Mod: F3

## 2022-12-14 PROCEDURE — 250N000013 HC RX MED GY IP 250 OP 250 PS 637: Performed by: EMERGENCY MEDICINE

## 2022-12-14 PROCEDURE — 72125 CT NECK SPINE W/O DYE: CPT

## 2022-12-14 PROCEDURE — 81001 URINALYSIS AUTO W/SCOPE: CPT | Performed by: EMERGENCY MEDICINE

## 2022-12-14 PROCEDURE — 84484 ASSAY OF TROPONIN QUANT: CPT | Performed by: EMERGENCY MEDICINE

## 2022-12-14 PROCEDURE — 73560 X-RAY EXAM OF KNEE 1 OR 2: CPT | Mod: RT

## 2022-12-14 PROCEDURE — 93005 ELECTROCARDIOGRAM TRACING: CPT | Performed by: EMERGENCY MEDICINE

## 2022-12-14 PROCEDURE — 85014 HEMATOCRIT: CPT | Performed by: EMERGENCY MEDICINE

## 2022-12-14 PROCEDURE — 70450 CT HEAD/BRAIN W/O DYE: CPT

## 2022-12-14 PROCEDURE — 82310 ASSAY OF CALCIUM: CPT | Performed by: EMERGENCY MEDICINE

## 2022-12-14 RX ORDER — CLONIDINE HYDROCHLORIDE 0.1 MG/1
0.1 TABLET ORAL ONCE
Status: COMPLETED | OUTPATIENT
Start: 2022-12-14 | End: 2022-12-14

## 2022-12-14 RX ADMIN — CLONIDINE HYDROCHLORIDE 0.1 MG: 0.1 TABLET ORAL at 21:28

## 2022-12-14 ASSESSMENT — ENCOUNTER SYMPTOMS: WOUND: 1

## 2022-12-14 ASSESSMENT — ACTIVITIES OF DAILY LIVING (ADL): ADLS_ACUITY_SCORE: 35

## 2022-12-15 NOTE — ED NOTES
ED Provider In Triage Note  [facility]  Encounter Date: Dec 14, 2022    Chief Complaint   Patient presents with     Fall       Brief HPI:   Martell Patel is a 72 year old male presenting to the Emergency Department with a chief complaint of fall. Pt's knee gave out and he fell. Unreliable historian.    Is on blood thinners, unsure if he hit his head. He wandered from home and fell and was brought back to family by strangers.      Brief Physical Exam:  There were no vitals taken for this visit.  General: Non-toxic appearing  HEENT: see below  Resp: No respiratory distress  Abdomen: Non-peritoneal  Neuro: Alert, oriented, answers questions appropriately  Psych: Behavior appropriate  Left ring finger pain, deformity, R knee pain. Dirt on forehead    Plan Initiated in Triage:  Ddx includes: ich, left finger fx, r/o acs    PIT Dispo[facility]   [facility]up and ED bed availability    Vasyl Varela MD on 12/14/2022 at 6:51 PM    Patient was evaluated by the Physician in Triage due to a limitation of available rooms in the Emergency Department. A plan of care was discussed based on the information obtained on the initial evaluation and patient was consuled to return back to the Emergency Department lobby after this initial evalutaiton until results were obtained or a room became available in the Emergency Department. Patient was counseled not to leave prior to receiving the results of their workup.     Vasyl Varela MD  [facility] EMERGENCY DEPARTMENT  [address] [place] [id number]  [phone]       Vasyl Varela MD  12/14/22 [id number]

## 2022-12-15 NOTE — ED TRIAGE NOTES
"Patient presents after a fall- \"think my knee gave out.\"  Unknown if pt hit head or LOC- takes blood thinners.  Pt has laceration to left ring finger, minimal bleeding.      "

## 2022-12-15 NOTE — ED PROVIDER NOTES
EMERGENCY DEPARTMENT ENCOUNTER      NAME: Martell Patel  AGE: 72 year old male  YOB: 1950  MRN: [id number]  EVALUATION DATE & TIME: No admission date for patient encounter.    PCP: Isela Jay    ED PROVIDER: Yassine Colin MD      Chief Complaint   Patient presents with     Fall         FINAL IMPRESSION:  Mechanical fall  Right ring finger dislocation  Right ring finger laceration  Hypertension        ED COURSE & MEDICAL DECISION MAKIN:12 PM I met with patient for initial interview and encounter in waiting room with family members. PPE worn includes N95 mask.  8:47 PM I updated the patient and family member about lab and imaging results.  CT of head and neck unremarkable.  X-ray of ring finger of the left hand reveals a DIP subluxation.  Patient underwent digital block with 1% lidocaine with epinephrine.  We will plan on reduction.  Partial-thickness laceration noted on the DIP joint volar surface.  Troponin minimally elevated.  Electrolytes unremarkable.  CBC normal.  INR normal.  9:31 PM.  Patient underwent finger splinting.  AlumaFoam padded splint placed with finger in extension.  Secured with tape.  Sensation and capillary refill intact before and after splinting.  Splinting performed by Dr. Colin  Pertinent Labs & Imaging studies reviewed. (See chart for details)  72 year old male presents to the Emergency Department for evaluation of mechanical fall.  Patient reports his knees just giving out due to weakness.  Patient with underlying ataxic disorder.  Patient denies any obvious injuries.  However patient made a trauma alert secondary to use of Brilinta following neck stenting.  Review of records indicate patient underwent stenting in 2022 with ST segment elevation in infarction.  Echo with preserved ejection fraction of 55 to 60%.        At the conclusion of the encounter I discussed the results of all of the tests and the disposition. The questions were answered. The patient  "or family acknowledged understanding and was agreeable with the care plan.     *    Medical Decision Making         MEDICATIONS GIVEN IN THE EMERGENCY:  Medications - No data to display    NEW PRESCRIPTIONS STARTED AT TODAY'S ER VISIT  New Prescriptions    No medications on file          =================================================================    HPI    Patient information was obtained from: Patient     Use of : N/A         Martell Patel is a 72 year old male with a pertinent history of CIDP, Alzheimer's disease, coronary artery disease, hyperlipidemia, and hypertension who presents to this ED via walk in for evaluation of fall.    The patient presents with a fall today stating his \"knee gave out\". Unsure if he hit his head or loss of conscious. The patient is on anticoagulant. He has a laceration to left ring finger, minimal bleeding. Denies any other complaints or concerns at the moment.       REVIEW OF SYSTEMS   Review of Systems   Skin: Positive for wound (laceration to left ring finger).   All other systems reviewed and are negative.       PAST MEDICAL HISTORY:  Past Medical History:   Diagnosis Date     Arthritis      Ataxia      Chest pain      CIDP (chronic inflammatory demyelinating polyneuropathy) (H)     Remnant is that his sense of balance is impaired, particularly at night.     Coronary artery disease      HLD (hyperlipidemia)      HTN (hypertension)      Hyperlipidemia      Hypertension      Hypertension      Sleep apnea        PAST SURGICAL HISTORY:  Past Surgical History:   Procedure Laterality Date     APPENDECTOMY       APPENDECTOMY       CARDIAC CATHETERIZATION  12/06/2016     CARDIAC CATHETERIZATION N/A 12/6/2016    Procedure: Coronary Angiogram;  Surgeon: Duane Gandhi MD;  Location: [facility] Cath Lab;  Service:      CORONARY STENT PLACEMENT  12/06/2016    [place] to RCA.     CV CORONARY ANGIOGRAM N/A 3/23/2022    Procedure: Coronary Angiogram;  Surgeon: Kaycee" "MD Castillo;  Location:  HEART CARDIAC CATH LAB     CV PCI N/A 3/23/2022    Procedure: Percutaneous Coronary Intervention;  Surgeon: Castillo Benoit MD;  Location:  HEART CARDIAC CATH LAB     ENT SURGERY      tonsilectomy     ORTHOPEDIC SURGERY       REVISION AMPUTATION OF FINGER      Top of right pointer finger - cut on      THROAT SURGERY      \"To remove some polyps in my throat.  No, they weren't cancer.\"     TONSILLECTOMY       US BIOPSY FINE NEEDLE ASPIRATION LYMPH NODE (BREAST) LEFT Left 7/6/2021           CURRENT MEDICATIONS:    aspirin (ASA) 81 MG EC tablet  atorvastatin (LIPITOR) 80 MG tablet  fluticasone (FLONASE) 50 MCG/ACT nasal spray  lisinopril (ZESTRIL) 40 MG tablet  metoprolol succinate ER (TOPROL-XL) 25 MG 24 hr tablet  nitroGLYcerin (NITROSTAT) 0.4 MG sublingual tablet  potassium chloride ER (KLOR-CON M) 20 MEQ CR tablet  ticagrelor (BRILINTA) 90 MG tablet        ALLERGIES:  Allergies   Allergen Reactions     Wellbutrin [Bupropion] Other (See Comments)     Nightmare     Varenicline      Other reaction(s): night bhakta       FAMILY HISTORY:  Family History   Problem Relation Age of Onset     Coronary Artery Disease Brother      Coronary Artery Disease Brother        SOCIAL HISTORY:   Social History     Socioeconomic History     Marital status:      Spouse name: None     Number of children: None     Years of education: None     Highest education level: None   Tobacco Use     Smoking status: Every Day     Packs/day: 1.50     Years: 50.00     Pack years: 75.00     Types: Cigarettes     Smokeless tobacco: Never   Substance and Sexual Activity     Alcohol use: No     Comment: Alcoholic Drinks/day: No alcohol since 1978     Drug use: No       VITALS:  BP (!) 208/95   Pulse 66   Temp 97.8  F (36.6  C) (Temporal)   Resp 20   Ht 1.727 m (5' 8\")   Wt 81.6 kg (180 lb)   SpO2 95%   BMI 27.37 kg/m      PHYSICAL EXAM    Constitutional: Well developed, Well nourished, NAD  HENT: " Normocephalic, Atraumatic, Bilateral external ears normal, Oropharynx normal, mucous membranes moist, Nose normal. Neck - Normal range of motion, No tenderness, Supple, No stridor.    Eyes: PERRL, EOMI, Conjunctiva normal, No discharge.   Respiratory: Normal breath sounds, No respiratory distress, No wheezing, Speaks full sentences easily. No cough.    Cardiovascular: Normal heart rate, Regular rhythm, No murmurs, No rubs, No gallops. Chest wall nontender.   Abdominal: Soft, nontender, nondistended, no palpable masses, no guarding, no rebound  GI: No excessive obesity. Bowel sounds normal, Soft, No tenderness, No masses, No flank tenderness. No rebound or guarding.   Musculoskeletal: No edema.  No cyanosis, No clubbing. Good range of motion in all major joints. No tenderness to palpation or major deformities noted. No tenderness of the CTLS spine.    Integument: Warm, Dry, No erythema, No rash. No petechiae.  tattoos.   Neurologic: Normal motor function, Normal sensory function, No focal deficits noted. Normal gait.    Psychiatric: Affect normal, Judgment normal, Mood normal. Cooperative.      LAB:  All pertinent labs reviewed and interpreted.     Results for orders placed or performed during the hospital encounter of 12/14/22   Head CT w/o contrast     Status: None    Narrative    EXAM: CT HEAD W/O CONTRAST, CT CERVICAL SPINE W/O CONTRAST  LOCATION: [facility]  DATE/TIME: 12/14/2022 7:34 PM    INDICATION: Traumatic head and neck injury. Fall, on blood thinners, baseline dementia.  COMPARISON: MRI brain 04/08/2022  TECHNIQUE:   1. Routine CT head without IV contrast. Multiplanar reformats. Dose reduction techniques were used.  2. Routine CT cervical spine without IV contrast. Multiplanar reformats. Dose reduction techniques were used.    FINDINGS:   HEAD CT:   INTRACRANIAL CONTENTS: No intracranial hemorrhage, extraaxial collection, or mass effect.  No CT evidence of acute infarct. Multiple  small chronic lacunar infarctions in the bilateral centrum semiovale, bilateral corona radiata, and left cerebellar   hemisphere. Moderate to severe presumed chronic small vessel ischemic changes. Mild to moderate generalized volume loss again with disproportionate volume loss affecting the mesial temporal lobe structures and hippocampi, left greater than right. No   hydrocephalus.     VISUALIZED ORBITS/SINUSES/MASTOIDS: Prior bilateral cataract surgery. Visualized portions of the orbits are otherwise unremarkable. No significant paranasal sinus mucosal disease. No middle ear or mastoid effusion.    BONES/SOFT TISSUES: No acute calvarial fracture or scalp hematoma.      CERVICAL SPINE CT:   VERTEBRAE: Normal vertebral body heights. Straightening of the usual cervical lordosis with slight anterolisthesis of C4 on C5, likely secondary to patient positioning or muscle spasm. No evidence for acute fracture or posttraumatic subluxation.     CANAL/FORAMINA: No high-grade spinal canal stenosis. Severe degenerative neuroforaminal stenosis on the left at C2-C3 and C3-C4 and on the right at C4-C5.    PARASPINAL: Mild soft tissues along the posterior paraspinous soft tissues. Likely dermal in inclusion cyst versus sebaceous cyst in the posterior parasagittal subcutaneous fat of the left upper back measures 1.5 cm AP x 2.1 cm TV x 2.9 cm CC. Visualized   lung fields are clear.          Impression    IMPRESSION:  HEAD CT:  1.  No acute intracranial abnormality.  2.  No acute calvarial fracture or scalp hematoma.  3.  Multifocal chronic lacunar infarctions as described above with additional presumed sequelae of moderate to severe chronic small vessel seen disease.  4.  Mild to moderate global brain parenchymal volume loss with disproportionate volume loss affecting the mesial temporal lobe structures and hippocampi, left greater than right, which can be seen in the setting of degenerative processes such as   Alzheimer's  dementia.    CERVICAL SPINE CT:  1.  No CT evidence for acute fracture or post traumatic subluxation.  2.  No high-grade spinal canal stenosis.  3.  Severe degenerative neuroforaminal stenosis on the left at C2-C3 and C3-C4 and on the right at C4-C5.   Cervical spine CT w/o contrast     Status: None    Narrative    EXAM: CT HEAD W/O CONTRAST, CT CERVICAL SPINE W/O CONTRAST  LOCATION: [facility]  DATE/TIME: 12/14/2022 7:34 PM    INDICATION: Traumatic head and neck injury. Fall, on blood thinners, baseline dementia.  COMPARISON: MRI brain 04/08/2022  TECHNIQUE:   1. Routine CT head without IV contrast. Multiplanar reformats. Dose reduction techniques were used.  2. Routine CT cervical spine without IV contrast. Multiplanar reformats. Dose reduction techniques were used.    FINDINGS:   HEAD CT:   INTRACRANIAL CONTENTS: No intracranial hemorrhage, extraaxial collection, or mass effect.  No CT evidence of acute infarct. Multiple small chronic lacunar infarctions in the bilateral centrum semiovale, bilateral corona radiata, and left cerebellar   hemisphere. Moderate to severe presumed chronic small vessel ischemic changes. Mild to moderate generalized volume loss again with disproportionate volume loss affecting the mesial temporal lobe structures and hippocampi, left greater than right. No   hydrocephalus.     VISUALIZED ORBITS/SINUSES/MASTOIDS: Prior bilateral cataract surgery. Visualized portions of the orbits are otherwise unremarkable. No significant paranasal sinus mucosal disease. No middle ear or mastoid effusion.    BONES/SOFT TISSUES: No acute calvarial fracture or scalp hematoma.      CERVICAL SPINE CT:   VERTEBRAE: Normal vertebral body heights. Straightening of the usual cervical lordosis with slight anterolisthesis of C4 on C5, likely secondary to patient positioning or muscle spasm. No evidence for acute fracture or posttraumatic subluxation.     CANAL/FORAMINA: No high-grade spinal  canal stenosis. Severe degenerative neuroforaminal stenosis on the left at C2-C3 and C3-C4 and on the right at C4-C5.    PARASPINAL: Mild soft tissues along the posterior paraspinous soft tissues. Likely dermal in inclusion cyst versus sebaceous cyst in the posterior parasagittal subcutaneous fat of the left upper back measures 1.5 cm AP x 2.1 cm TV x 2.9 cm CC. Visualized   lung fields are clear.          Impression    IMPRESSION:  HEAD CT:  1.  No acute intracranial abnormality.  2.  No acute calvarial fracture or scalp hematoma.  3.  Multifocal chronic lacunar infarctions as described above with additional presumed sequelae of moderate to severe chronic small vessel seen disease.  4.  Mild to moderate global brain parenchymal volume loss with disproportionate volume loss affecting the mesial temporal lobe structures and hippocampi, left greater than right, which can be seen in the setting of degenerative processes such as   Alzheimer's dementia.    CERVICAL SPINE CT:  1.  No CT evidence for acute fracture or post traumatic subluxation.  2.  No high-grade spinal canal stenosis.  3.  Severe degenerative neuroforaminal stenosis on the left at C2-C3 and C3-C4 and on the right at C4-C5.   XR Hand Left G/E 3 Views     Status: None    Narrative    EXAM: XR HAND LEFT G/E 3 VIEWS  LOCATION: [facility]  DATE/TIME: 12/14/2022 7:39 PM    INDICATION: Fall, finger pain.  COMPARISON: None available.      Impression    IMPRESSION: Dorsal dislocation of the left ring finger at the PIP joint, middle phalangeal base relative to proximal phalangeal head. Adjacent soft tissue swelling. No visible fracture. Scattered degenerative osteoarthritis in the left hand and wrist,   most severe at the thumb CMC joint. Benign cystic lucencies in the ulnar head and distal radius near the distal radial ulnar joint.    NOTE: ABNORMAL REPORT    THE DICTATION ABOVE DESCRIBES AN ABNORMALITY FOR WHICH FOLLOW-UP IS NEEDED.       XR Knee Right 1/2 Views     Status: None    Narrative    EXAM: XR KNEE RIGHT 1/2 VIEWS  LOCATION: [facility]  DATE/TIME: 12/14/2022 7:40 PM    INDICATION: Fall. Knee pain.  COMPARISON: None.      Impression    IMPRESSION: Anatomic alignment right knee. No acute displaced right knee fracture. Moderate medial and patellofemoral compartment right knee osteoarthritis with marginal spurring. Small knee joint effusion. No significant anterior knee soft tissue   swelling.   CBC (+ platelets, no diff)     Status: Normal   Result Value Ref Range    WBC Count 10.7 4.0 - 11.0 10e3/uL    RBC Count 5.68 4.40 - 5.90 10e6/uL    Hemoglobin 17.3 13.3 - 17.7 g/dL    Hematocrit 50.0 40.0 - 53.0 %    MCV 88 78 - 100 fL    MCH 30.5 26.5 - 33.0 pg    MCHC 34.6 31.5 - 36.5 g/dL    RDW 14.0 10.0 - 15.0 %    Platelet Count 170 150 - 450 10e3/uL   Basic metabolic panel     Status: Abnormal   Result Value Ref Range    Sodium 135 (L) 136 - 145 mmol/L    Potassium 3.8 3.4 - 5.3 mmol/L    Chloride 101 98 - 107 mmol/L    Carbon Dioxide (CO2) 24 22 - 29 mmol/L    Anion Gap 10 7 - 15 mmol/L    Urea Nitrogen 19.0 8.0 - 23.0 mg/dL    Creatinine 0.93 0.67 - 1.17 mg/dL    Calcium 9.0 8.8 - 10.2 mg/dL    Glucose 107 (H) 70 - 99 mg/dL    GFR Estimate 86 >60 mL/min/1.73m2   INR     Status: Normal   Result Value Ref Range    INR 1.01 0.85 - 1.15   Troponin T, High Sensitivity (now)     Status: Abnormal   Result Value Ref Range    Troponin T, High Sensitivity 23 (H) <=22 ng/L     RADIOLOGY:  Reviewed all pertinent imaging. Please see official radiology report.  Head CT w/o contrast    (Results Pending)   Cervical spine CT w/o contrast    (Results Pending)   XR Hand Left G/E 3 Views    (Results Pending)   XR Knee Right 3 Views    (Results Pending)       EKG:    Normal sinus rhythm.  Rate of 61.  Normal QRS.  Normal ST segments.  Unchanged compared to March 23, 2022    I have independently reviewed and interpreted the EKG(s)  documented above.    PROCEDURES:   PROCEDURE: Reduction   INDICATIONS:  Left right ring finger DIP subluxation   PROCEDURE PROVIDER: Dr Yassine Colin   CONSENT: Risks, benefits and alternatives were discussed with and Verbal consent was obtained from Patient.   MEDICATIONS:  1% lidocaine without epinephrine, digital block, 4 cc total   REDUCTION PROCEDURE DESCRIPTION:  Easily reduced.  Splinting performed   COMPLICATIONS:  None         I, Tonie Moreno, am serving as a scribe to document services personally performed by Yassine Colin MD, based on my observation and the provider's statements to me. I, Yassine Colin MD, attest that Tonie Her is acting in a scribe capacity, has observed my performance of the services and has documented them in accordance with my direction.    Yassine Colin MD  [facility] EMERGENCY DEPARTMENT  [address] [place] [id number] [phone]       Yassine Colin MD  12/14/22 2131       Yassine Colin MD  12/14/22 2136

## 2022-12-19 ENCOUNTER — TRANSFERRED RECORDS (OUTPATIENT)
Dept: HEALTH INFORMATION MANAGEMENT | Facility: CLINIC | Age: 72
End: 2022-12-19

## 2023-01-12 ENCOUNTER — TRANSFERRED RECORDS (OUTPATIENT)
Dept: HEALTH INFORMATION MANAGEMENT | Facility: CLINIC | Age: 73
End: 2023-01-12

## 2023-01-12 ENCOUNTER — LAB REQUISITION (OUTPATIENT)
Dept: LAB | Facility: CLINIC | Age: 73
End: 2023-01-12
Payer: COMMERCIAL

## 2023-01-12 DIAGNOSIS — Z95.5 PRESENCE OF CORONARY ANGIOPLASTY IMPLANT AND GRAFT: ICD-10-CM

## 2023-01-12 LAB
ALBUMIN SERPL BCG-MCNC: 4.1 G/DL (ref 3.5–5.2)
ALP SERPL-CCNC: 121 U/L (ref 40–129)
ALT SERPL W P-5'-P-CCNC: 23 U/L (ref 10–50)
ANION GAP SERPL CALCULATED.3IONS-SCNC: 14 MMOL/L (ref 7–15)
AST SERPL W P-5'-P-CCNC: 21 U/L (ref 10–50)
BILIRUB SERPL-MCNC: 0.4 MG/DL
BUN SERPL-MCNC: 20.3 MG/DL (ref 8–23)
CALCIUM SERPL-MCNC: 9 MG/DL (ref 8.8–10.2)
CHLORIDE SERPL-SCNC: 107 MMOL/L (ref 98–107)
CHOLEST SERPL-MCNC: 138 MG/DL
CREAT SERPL-MCNC: 0.9 MG/DL (ref 0.67–1.17)
DEPRECATED HCO3 PLAS-SCNC: 20 MMOL/L (ref 22–29)
GFR SERPL CREATININE-BSD FRML MDRD: 89 ML/MIN/1.73M2
GLUCOSE SERPL-MCNC: 94 MG/DL (ref 70–99)
HDLC SERPL-MCNC: 53 MG/DL
LDLC SERPL CALC-MCNC: 74 MG/DL
NONHDLC SERPL-MCNC: 85 MG/DL
POTASSIUM SERPL-SCNC: 3.9 MMOL/L (ref 3.4–5.3)
PROT SERPL-MCNC: 6.2 G/DL (ref 6.4–8.3)
SODIUM SERPL-SCNC: 141 MMOL/L (ref 136–145)
TRIGL SERPL-MCNC: 56 MG/DL

## 2023-01-12 PROCEDURE — 80053 COMPREHEN METABOLIC PANEL: CPT | Mod: ORL | Performed by: FAMILY MEDICINE

## 2023-01-12 PROCEDURE — 80061 LIPID PANEL: CPT | Mod: ORL | Performed by: FAMILY MEDICINE

## 2023-03-16 ENCOUNTER — MEDICAL CORRESPONDENCE (OUTPATIENT)
Dept: HEALTH INFORMATION MANAGEMENT | Facility: CLINIC | Age: 73
End: 2023-03-16
Payer: COMMERCIAL

## 2023-03-17 ENCOUNTER — TRANSCRIBE ORDERS (OUTPATIENT)
Dept: OTHER | Age: 73
End: 2023-03-17

## 2023-03-17 DIAGNOSIS — F03.90 DEMENTIA WITHOUT BEHAVIORAL DISTURBANCE (H): ICD-10-CM

## 2023-03-17 DIAGNOSIS — F02.80 ALZHEIMER'S DISEASE WITH LATE ONSET (H): Primary | ICD-10-CM

## 2023-03-17 DIAGNOSIS — G30.1 ALZHEIMER'S DISEASE WITH LATE ONSET (H): Primary | ICD-10-CM

## 2023-03-30 ENCOUNTER — OFFICE VISIT (OUTPATIENT)
Dept: CARDIOLOGY | Facility: CLINIC | Age: 73
End: 2023-03-30
Attending: INTERNAL MEDICINE
Payer: COMMERCIAL

## 2023-03-30 VITALS
SYSTOLIC BLOOD PRESSURE: 144 MMHG | BODY MASS INDEX: 29.5 KG/M2 | RESPIRATION RATE: 20 BRPM | DIASTOLIC BLOOD PRESSURE: 78 MMHG | HEART RATE: 77 BPM | OXYGEN SATURATION: 95 % | WEIGHT: 194 LBS

## 2023-03-30 DIAGNOSIS — I25.10 CORONARY ARTERY DISEASE INVOLVING NATIVE CORONARY ARTERY OF NATIVE HEART WITHOUT ANGINA PECTORIS: Primary | ICD-10-CM

## 2023-03-30 DIAGNOSIS — I21.3 ST ELEVATION MYOCARDIAL INFARCTION (STEMI), UNSPECIFIED ARTERY (H): ICD-10-CM

## 2023-03-30 DIAGNOSIS — E78.2 MIXED HYPERLIPIDEMIA: ICD-10-CM

## 2023-03-30 DIAGNOSIS — I10 ESSENTIAL HYPERTENSION: ICD-10-CM

## 2023-03-30 PROCEDURE — 99214 OFFICE O/P EST MOD 30 MIN: CPT | Performed by: INTERNAL MEDICINE

## 2023-03-30 RX ORDER — ACETAMINOPHEN 500 MG
500-1000 TABLET ORAL EVERY 6 HOURS PRN
COMMUNITY

## 2023-03-30 RX ORDER — RIVASTIGMINE TARTRATE 3 MG/1
3 CAPSULE ORAL 2 TIMES DAILY
COMMUNITY
Start: 2023-03-09

## 2023-03-30 RX ORDER — RIVASTIGMINE TARTRATE 4.5 MG/1
CAPSULE ORAL
COMMUNITY
Start: 2023-03-09

## 2023-03-30 NOTE — LETTER
3/30/2023    Isela Jay MD  [address]  [place] [address]    RE: Martell Patel       Dear Colleague,     I had the pleasure of seeing Martell Patel in the [facility].    HEART CARE ENCOUNTER CONSULTATON NOTE      [facility]  [phone]      Assessment/Recommendations   Assessment:    1. Coronary artery disease status post mid RCA drug eluting stent (Synergy 3.5 x 16m), Myocardial infarction with ST elevation from acute thrombosis of his right PDA (March 2022).  Mild in-stent restenosis of the RCA stent was noted.  No angina.   2. Hyperlipidemia, LDL: 74  LDL Cholesterol Calculated   Date Value Ref Range Status   01/12/2023 74 <=100 mg/dL Final   3. Essential Hypertension, elevated.  Patient quite anxious today in clinic  4. History of sleep apnea.    5. Tobacco Abuse continues   6.  Seasonal allergies    Plan:  1.  Continue Lisinopril   2. Discontinue Brilinta 90 mg twice daily for 1 year  3. Continue atorvastatin to 80 mg daily for HLD and CAD.   4. Continue ASA 81 mg daily, lifelong.  5.  Patient can proceed with knee surgery if required from a cardiovascular standpoint.         History of Present Illness/Subjective    HPI: Martell Patel is a 73 year old male coronary artery disease, hypertension, hyperlipidemia, active smoker presents to cardiology clinic in follow-up.    Systemic patient had a stable course.  He continues to deny any active chest pain.  Does have chronic stable dyspnea exertion.  No significant lower extremity orthopnea or PND symptoms.  We will discontinue Brilinta as he has been on medication for over 1 year post percutaneous coronary invention.  Continues struggle with right-sided knee pain.    Continues have significant anxiety related to service in [place] war.  Also [facility]    Continues to have anxiety related to being in [place] and [facility]    Pressure remains elevated  today repeat blood pressure was 144/78.    Recently diagnosed with memory impairment.    Echocardiogram Results: 3/23/22  Interpretation Summary  Global and regional left ventricular function is normal with an EF of 55-60%.  Global right ventricular function is normal. The right ventricle is normal  size.  No significant valvular abnormalities.  No pericardial effusion is present.  There is no prior study for direct comparison.      Coronary Angiogram: 3/23/2022    Inferior STEMI in the setting of RPDA occlusion. There was also a 95% lesion in the distal RCA. Previously placed stent in the mid RCA is patent with mild in-stent restenosis. The RCA has moderate diffuse disease.    Status post successful PCI of the distal RCA lesion using a 3.0 x 16 mm Synergy drug-eluting stent.    With wiring of the lesion, the clot in the RPDA was embolized further distally. Due to the lesion in the RPDA being very distal and this being a very small vessel at this point, it could not be stented and we elected to proceed with conservative management.    Nonobstructive coronary atherosclerosis in the left coronary system.    Ost Cx to Prox Cx lesion is 20% stenosed.    1st Mrg lesion is 30% stenosed.            Physical Examination  Review of Systems   Vitals: BP (!) 197/76 (BP Location: Left arm, Patient Position: Sitting, Cuff Size: Adult Regular)   Pulse 77   Resp 20   Wt 88 kg (194 lb)   SpO2 95%   BMI 29.50 kg/m    BMI= Body mass index is 29.5 kg/m .  Wt Readings from Last 3 Encounters:   03/30/23 88 kg (194 lb)   12/14/22 81.6 kg (180 lb)   08/15/22 85.3 kg (188 lb)        Pleasant   ENT/Mouth: membranes moist, no oral lesions or bleeding gums.      EYES:  no scleral icterus, normal conjunctivae       Chest/Lungs:   lungs are clear to auscultation, no rales or wheezing,.    Cardiovascular:   Regular. Normal first and second heart sounds with no murmurs. d, radial and posterior tibial pulses are intact, Jugular venous  "pressure noraml, no pitting edema bilaterally.  No change   Abdomen:  no tenderness; bowel sounds are present   Extremities: no cyanosis or clubbing   Skin: no xanthelasma, warm.    Neurologic: normal  bilateral, no tremors     Psychiatric: alert and oriented x3, calm        Please refer above for cardiac ROS details.        Medical History  Surgical History Family History Social History   Past Medical History:   Diagnosis Date     Arthritis      Ataxia      Chest pain      CIDP (chronic inflammatory demyelinating polyneuropathy) (H)     Remnant is that his sense of balance is impaired, particularly at night.     Coronary artery disease      HLD (hyperlipidemia)      HTN (hypertension)      Hyperlipidemia      Hypertension      Hypertension      Sleep apnea      Past Surgical History:   Procedure Laterality Date     APPENDECTOMY       APPENDECTOMY       CARDIAC CATHETERIZATION  12/06/2016     CARDIAC CATHETERIZATION N/A 12/6/2016    Procedure: Coronary Angiogram;  Surgeon: Duane Gandhi MD;  Location: [facility];  Service:      CORONARY STENT PLACEMENT  12/06/2016    [place] to RCA.     CV CORONARY ANGIOGRAM N/A 3/23/2022    Procedure: Coronary Angiogram;  Surgeon: Castillo Benoit MD;  Location: [facility] CARDIAC CATH LAB     CV PCI N/A 3/23/2022    Procedure: Percutaneous Coronary Intervention;  Surgeon: Castillo Benoit MD;  Location: [facility] CARDIAC CATH LAB     ENT SURGERY      tonsilectomy     ORTHOPEDIC SURGERY       REVISION AMPUTATION OF FINGER      Top of right pointer finger - cut on      THROAT SURGERY      \"To remove some polyps in my throat.  No, they weren't cancer.\"     TONSILLECTOMY       US BIOPSY FINE NEEDLE ASPIRATION LYMPH NODE (BREAST) LEFT Left 7/6/2021     Family History   Problem Relation Age of Onset     Coronary Artery Disease Brother      Coronary Artery Disease Brother         Social History     Socioeconomic History     Marital status:      Spouse " name: Not on file     Number of children: Not on file     Years of education: Not on file     Highest education level: Not on file   Occupational History     Not on file   Tobacco Use     Smoking status: Every Day     Packs/day: 1.50     Years: 50.00     Pack years: 75.00     Types: Cigarettes     Smokeless tobacco: Never   Substance and Sexual Activity     Alcohol use: No     Comment: Alcoholic Drinks/day: No alcohol since 1978     Drug use: No     Sexual activity: Not on file   Other Topics Concern     Not on file   Social History Narrative     Not on file     Social Determinants of Health     Financial Resource Strain: Not on file   Food Insecurity: Not on file   Transportation Needs: Not on file   Physical Activity: Not on file   Stress: Not on file   Social Connections: Not on file   Intimate Partner Violence: Not on file   Housing Stability: Not on file           Medications  Allergies   Current Outpatient Medications   Medication Sig Dispense Refill     acetaminophen (TYLENOL) 500 MG tablet Take 500-1,000 mg by mouth every 6 hours as needed for mild pain       aspirin (ASA) 81 MG EC tablet Take 1 tablet (81 mg) by mouth daily Start tomorrow. 90 tablet 3     atorvastatin (LIPITOR) 80 MG tablet Take 1 tablet (80 mg) by mouth At Bedtime 90 tablet 3     fluticasone (FLONASE) 50 MCG/ACT nasal spray USE 1-2 SPRAYS INTO EACH NOSTRIL ONCE DAILY       lisinopril (ZESTRIL) 40 MG tablet Take 1 tablet (40 mg) by mouth daily 90 tablet 1     metoprolol succinate ER (TOPROL-XL) 25 MG 24 hr tablet Take 1 tablet (25 mg) by mouth daily 90 tablet 3     potassium chloride ER (KLOR-CON M) 20 MEQ CR tablet Take 1 tablet (20 mEq) by mouth 2 times daily 90 tablet 3     rivastigmine (EXELON) 3 MG capsule Take 3 mg by mouth 2 times daily       ticagrelor (BRILINTA) 90 MG tablet Take 1 tablet (90 mg) by mouth in the morning and 1 tablet (90 mg) in the evening. 180 tablet 3     nitroGLYcerin (NITROSTAT) 0.4 MG sublingual tablet Place 1  tablet (0.4 mg) under the tongue every 5 minutes as needed for chest pain For chest pain place 1 tablet under the tongue every 5 minutes for 3 doses. If symptoms persist 5 minutes after 1st dose call 911. (Patient not taking: Reported on 3/30/2023) 30 tablet 1     rivastigmine (EXELON) 4.5 MG capsule  (Patient not taking: Reported on 3/30/2023)         Allergies   Allergen Reactions     Wellbutrin [Bupropion] Other (See Comments)     Nightmare     Varenicline      Other reaction(s): night bhakta          Lab Results    Chemistry/lipid CBC Cardiac Enzymes/BNP/TSH/INR   Recent Labs   Lab Test 06/02/21  1028   CHOL 142   HDL 51   LDL 79   TRIG 62     Recent Labs   Lab Test 06/02/21  1028 03/03/20  1217 09/10/18  0751   LDL 79 81 69     Recent Labs   Lab Test 07/19/21  1346      POTASSIUM 3.8   CHLORIDE 98   CO2 28   *   BUN 16   CR 1.03   GFRESTIMATED 72   TIMOTHY 9.2     Recent Labs   Lab Test 07/19/21  1346 06/02/21  1028 11/23/20  1005   CR 1.03 1.00 1.04     No results for input(s): A1C in the last 58838 hours.       No results for input(s): WBC, HGB, HCT, MCV, PLT in the last 31629 hours.  No results for input(s): HGB in the last 54930 hours. No results for input(s): TROPONINI in the last 54246 hours.  No results for input(s): BNP, NTBNPI, NTBNP in the last 29248 hours.  Recent Labs   Lab Test 09/10/18  0751   TSH 2.29     No results for input(s): INR in the last 41020 hours.     Pablito Solis DO    Thank you for allowing me to participate in the care of your patient.      Sincerely,     Pablito Solis DO     [facility]    cc:   Pablito Solis DO  [address] [place] [address]

## 2023-03-30 NOTE — PROGRESS NOTES
HEART CARE ENCOUNTER CONSULTATON NOTE      [facility]  [phone]      Assessment/Recommendations   Assessment:    1. Coronary artery disease status post mid RCA drug eluting stent (Synergy 3.5 x 16m), Myocardial infarction with ST elevation from acute thrombosis of his right PDA (March 2022).  Mild in-stent restenosis of the RCA stent was noted.  No angina.   2. Hyperlipidemia, LDL: 74  LDL Cholesterol Calculated   Date Value Ref Range Status   01/12/2023 74 <=100 mg/dL Final   3. Essential Hypertension, elevated.  Patient quite anxious today in clinic  4. History of sleep apnea.    5. Tobacco Abuse continues   6.  Seasonal allergies    Plan:  1.  Continue Lisinopril   2. Discontinue Brilinta 90 mg twice daily for 1 year  3. Continue atorvastatin to 80 mg daily for HLD and CAD.   4. Continue ASA 81 mg daily, lifelong.  5.  Patient can proceed with knee surgery if required from a cardiovascular standpoint.         History of Present Illness/Subjective    HPI: Martell Patel is a 73 year old male coronary artery disease, hypertension, hyperlipidemia, active smoker presents to cardiology clinic in follow-up.    Systemic patient had a stable course.  He continues to deny any active chest pain.  Does have chronic stable dyspnea exertion.  No significant lower extremity orthopnea or PND symptoms.  We will discontinue Brilinta as he has been on medication for over 1 year post percutaneous coronary invention.  Continues struggle with right-sided knee pain.    Continues have significant anxiety related to service in [place] war.  Also remains [facility]    Continues to have anxiety related to being in [place] and dealing with the [facility]    Pressure remains elevated today repeat blood pressure was 144/78.    Recently diagnosed with memory impairment.    Echocardiogram Results: 3/23/22  Interpretation Summary  Global and regional left ventricular function is normal with  an EF of 55-60%.  Global right ventricular function is normal. The right ventricle is normal  size.  No significant valvular abnormalities.  No pericardial effusion is present.  There is no prior study for direct comparison.      Coronary Angiogram: 3/23/2022    Inferior STEMI in the setting of RPDA occlusion. There was also a 95% lesion in the distal RCA. Previously placed stent in the mid RCA is patent with mild in-stent restenosis. The RCA has moderate diffuse disease.    Status post successful PCI of the distal RCA lesion using a 3.0 x 16 mm Synergy drug-eluting stent.    With wiring of the lesion, the clot in the RPDA was embolized further distally. Due to the lesion in the RPDA being very distal and this being a very small vessel at this point, it could not be stented and we elected to proceed with conservative management.    Nonobstructive coronary atherosclerosis in the left coronary system.    Ost Cx to Prox Cx lesion is 20% stenosed.    1st Mrg lesion is 30% stenosed.            Physical Examination  Review of Systems   Vitals: BP (!) 197/76 (BP Location: Left arm, Patient Position: Sitting, Cuff Size: Adult Regular)   Pulse 77   Resp 20   Wt 88 kg (194 lb)   SpO2 95%   BMI 29.50 kg/m    BMI= Body mass index is 29.5 kg/m .  Wt Readings from Last 3 Encounters:   03/30/23 88 kg (194 lb)   12/14/22 81.6 kg (180 lb)   08/15/22 85.3 kg (188 lb)        Pleasant   ENT/Mouth: membranes moist, no oral lesions or bleeding gums.      EYES:  no scleral icterus, normal conjunctivae       Chest/Lungs:   lungs are clear to auscultation, no rales or wheezing,.    Cardiovascular:   Regular. Normal first and second heart sounds with no murmurs. d, radial and posterior tibial pulses are intact, Jugular venous pressure noraml, no pitting edema bilaterally.  No change   Abdomen:  no tenderness; bowel sounds are present   Extremities: no cyanosis or clubbing   Skin: no xanthelasma, warm.    Neurologic: normal   "bilateral, no tremors     Psychiatric: alert and oriented x3, calm        Please refer above for cardiac ROS details.        Medical History  Surgical History Family History Social History   Past Medical History:   Diagnosis Date     Arthritis      Ataxia      Chest pain      CIDP (chronic inflammatory demyelinating polyneuropathy) (H)     Remnant is that his sense of balance is impaired, particularly at night.     Coronary artery disease      HLD (hyperlipidemia)      HTN (hypertension)      Hyperlipidemia      Hypertension      Hypertension      Sleep apnea      Past Surgical History:   Procedure Laterality Date     APPENDECTOMY       APPENDECTOMY       CARDIAC CATHETERIZATION  12/06/2016     CARDIAC CATHETERIZATION N/A 12/6/2016    Procedure: Coronary Angiogram;  Surgeon: Duane Gandhi MD;  Location: [facility];  Service:      CORONARY STENT PLACEMENT  12/06/2016    [place] to RCA.     CV CORONARY ANGIOGRAM N/A 3/23/2022    Procedure: Coronary Angiogram;  Surgeon: Castillo Benoit MD;  Location: [facility] CARDIAC CATH LAB     CV PCI N/A 3/23/2022    Procedure: Percutaneous Coronary Intervention;  Surgeon: Castillo Benoit MD;  Location: [facility] CARDIAC CATH LAB     ENT SURGERY      tonsilectomy     ORTHOPEDIC SURGERY       REVISION AMPUTATION OF FINGER      Top of right pointer finger - cut on      THROAT SURGERY      \"To remove some polyps in my throat.  No, they weren't cancer.\"     TONSILLECTOMY       US BIOPSY FINE NEEDLE ASPIRATION LYMPH NODE (BREAST) LEFT Left 7/6/2021     Family History   Problem Relation Age of Onset     Coronary Artery Disease Brother      Coronary Artery Disease Brother         Social History     Socioeconomic History     Marital status:      Spouse name: Not on file     Number of children: Not on file     Years of education: Not on file     Highest education level: Not on file   Occupational History     Not on file   Tobacco Use     Smoking status: " Every Day     Packs/day: 1.50     Years: 50.00     Pack years: 75.00     Types: Cigarettes     Smokeless tobacco: Never   Substance and Sexual Activity     Alcohol use: No     Comment: Alcoholic Drinks/day: No alcohol since 1978     Drug use: No     Sexual activity: Not on file   Other Topics Concern     Not on file   Social History Narrative     Not on file     Social Determinants of Health     Financial Resource Strain: Not on file   Food Insecurity: Not on file   Transportation Needs: Not on file   Physical Activity: Not on file   Stress: Not on file   Social Connections: Not on file   Intimate Partner Violence: Not on file   Housing Stability: Not on file           Medications  Allergies   Current Outpatient Medications   Medication Sig Dispense Refill     acetaminophen (TYLENOL) 500 MG tablet Take 500-1,000 mg by mouth every 6 hours as needed for mild pain       aspirin (ASA) 81 MG EC tablet Take 1 tablet (81 mg) by mouth daily Start tomorrow. 90 tablet 3     atorvastatin (LIPITOR) 80 MG tablet Take 1 tablet (80 mg) by mouth At Bedtime 90 tablet 3     fluticasone (FLONASE) 50 MCG/ACT nasal spray USE 1-2 SPRAYS INTO EACH NOSTRIL ONCE DAILY       lisinopril (ZESTRIL) 40 MG tablet Take 1 tablet (40 mg) by mouth daily 90 tablet 1     metoprolol succinate ER (TOPROL-XL) 25 MG 24 hr tablet Take 1 tablet (25 mg) by mouth daily 90 tablet 3     potassium chloride ER (KLOR-CON M) 20 MEQ CR tablet Take 1 tablet (20 mEq) by mouth 2 times daily 90 tablet 3     rivastigmine (EXELON) 3 MG capsule Take 3 mg by mouth 2 times daily       ticagrelor (BRILINTA) 90 MG tablet Take 1 tablet (90 mg) by mouth in the morning and 1 tablet (90 mg) in the evening. 180 tablet 3     nitroGLYcerin (NITROSTAT) 0.4 MG sublingual tablet Place 1 tablet (0.4 mg) under the tongue every 5 minutes as needed for chest pain For chest pain place 1 tablet under the tongue every 5 minutes for 3 doses. If symptoms persist 5 minutes after 1st dose call 911.  (Patient not taking: Reported on 3/30/2023) 30 tablet 1     rivastigmine (EXELON) 4.5 MG capsule  (Patient not taking: Reported on 3/30/2023)         Allergies   Allergen Reactions     Wellbutrin [Bupropion] Other (See Comments)     Nightmare     Varenicline      Other reaction(s): night bhakta          Lab Results    Chemistry/lipid CBC Cardiac Enzymes/BNP/TSH/INR   Recent Labs   Lab Test 06/02/21  1028   CHOL 142   HDL 51   LDL 79   TRIG 62     Recent Labs   Lab Test 06/02/21  1028 03/03/20  1217 09/10/18  0751   LDL 79 81 69     Recent Labs   Lab Test 07/19/21  1346      POTASSIUM 3.8   CHLORIDE 98   CO2 28   *   BUN 16   CR 1.03   GFRESTIMATED 72   TIMOTHY 9.2     Recent Labs   Lab Test 07/19/21  1346 06/02/21  1028 11/23/20  1005   CR 1.03 1.00 1.04     No results for input(s): A1C in the last 59427 hours.       No results for input(s): WBC, HGB, HCT, MCV, PLT in the last 86468 hours.  No results for input(s): HGB in the last 25691 hours. No results for input(s): TROPONINI in the last 38764 hours.  No results for input(s): BNP, NTBNPI, NTBNP in the last 47824 hours.  Recent Labs   Lab Test 09/10/18  0751   TSH 2.29     No results for input(s): INR in the last 01179 hours.     Pablito Solis, DO

## 2023-03-30 NOTE — PATIENT INSTRUCTIONS
Please contact direct nurses line Monday through Friday 8 AM to 5 PM @ [phone]  After-hours contact cardiology office at [phone].    Can stop Brilinta

## 2023-05-01 ENCOUNTER — TELEPHONE (OUTPATIENT)
Dept: CARDIOLOGY | Facility: CLINIC | Age: 73
End: 2023-05-01
Payer: COMMERCIAL

## 2023-05-12 NOTE — TELEPHONE ENCOUNTER
Patient returned call and left voicemail message with update blood pressure reading.      Last Blood Pressure: 144/76  Last Heart Rate: 77  Date: 3/30/23  Location: [facility]    Today's Blood Pressure: 179/103  Today's Heart Rate: 69  Date:5/12/23  Time:4:33 p.m.  Location: Home BP    Patient reported blood pressure updated in Epic. Blood pressure continues to fall outside of the [place] Community Measures guidelines.  Message sent to primary cardiology team for further review.     Maddison Nicole MA

## 2023-05-17 NOTE — TELEPHONE ENCOUNTER
Called patient and suggested taking BP daily at various times for a week and to call back with week worth of blood pressures. He will do this and call writer back directly with his values. We will then update Dr. Solis with these values. -[facility] [place]

## 2023-06-27 ENCOUNTER — TELEPHONE (OUTPATIENT)
Dept: CARDIOLOGY | Facility: CLINIC | Age: 73
End: 2023-06-27
Payer: COMMERCIAL

## 2023-06-27 DIAGNOSIS — I25.10 CORONARY ARTERY DISEASE INVOLVING NATIVE CORONARY ARTERY OF NATIVE HEART WITHOUT ANGINA PECTORIS: Primary | ICD-10-CM

## 2023-06-27 DIAGNOSIS — I10 ESSENTIAL HYPERTENSION: ICD-10-CM

## 2023-06-28 NOTE — TELEPHONE ENCOUNTER
Dr. Solis,  Please see average blood pressure over a month below + 5/1 BP measure phone call. Any recommendations please?  Thanks,  Mal       ======================================================  See 5/1 [facility] Quality Measures telephone encounter.  Pt and his wife were advised to call back in 1 month with average blood pressures due to elevated BP reported on this call. He is on Lisinopril 40 mg + Metoprolol 25 mg and Sarah reports compliance with these medicines.     Telephone call was with Sarah. She reports that his blood pressure is still fairly elevated on most days. He averages around 150-160 systolic. He has had values as high as 170 and as low as 140 systolic. Heart rates typically in the 80's. She checked it at various times of the day, twice daily sometimes.    She reports that he still smokes and is also under a fair amount of stress right now with news of friends passing away and dealing with some home repairs. He drinks a fair amount of coffee, but has been encouraging him to cut back. Will route to Dr. Solis for any updates. -[facility]

## 2023-06-29 NOTE — TELEPHONE ENCOUNTER
Pablito Solis DO Caswell, Mallory J, RN  Caller: Unspecified (2 days ago,  3:25 PM)  Would recommend discharge metoprolol at change to coreg 6.25 mg BID.  Low sodium diet.

## 2023-06-30 RX ORDER — CARVEDILOL 6.25 MG/1
6.25 TABLET ORAL 2 TIMES DAILY WITH MEALS
Qty: 60 TABLET | Refills: 1 | Status: SHIPPED | OUTPATIENT
Start: 2023-06-30 | End: 2023-07-12

## 2023-06-30 NOTE — TELEPHONE ENCOUNTER
Called Sarah and updated on recommendations for Martell regarding blood pressure med change. She verbalized understanding and will replace metoprolol with carvedilol 6.25 mg twice daily with food. Medication education provided. She will call in with updated numbers in 2 weeks. She will make sure to set aside Metoprolol or dispose of at [facility]. Martell has a follow up visit scheduled with PMD on 7/12. BP can be reassessed and medication follow up to be done at that time. -[facility]

## 2023-07-06 ENCOUNTER — TELEPHONE (OUTPATIENT)
Dept: CARDIOLOGY | Facility: CLINIC | Age: 73
End: 2023-07-06
Payer: COMMERCIAL

## 2023-07-06 DIAGNOSIS — I10 ESSENTIAL HYPERTENSION: Primary | ICD-10-CM

## 2023-07-06 NOTE — TELEPHONE ENCOUNTER
[facility] Call Center    Phone Message    May a detailed message be left on voicemail: yes     Reason for Call: Other: Pt's spouse states he was put on a new medication to lower bp but is has caused it to be high, this morning after breakfast it was 181/103. Please reach out to further discuss.     Action Taken: Other: Cardiology    Travel Screening: Not Applicable     Thank you!  [facility]

## 2023-07-07 NOTE — TELEPHONE ENCOUNTER
Dr. Solis, any further medication adjustment at this time? CMM, RN  ________________________________________________    PC to wife Sarah and discussion. Lisinopril 40 mg as prescribed. Last dose of Metoprolol on 7/3/23. Stopped on 7/4 and started Carvedilol 6.25 mg twice daily. 0730/1930 with food.     Denies pt complaints of chest pain, headache or palpitations. Does have knee pain on occasion, arthritis and uses Tylenol. Denies use of Ibuprofen. Pt is having stressful time with his sister being in the hospital. Wife does endorse that he is tired/worn out. Following low sodium diet.     Following Bps reported:  7/4/23 209/110 . 198/105  7/5/23 167/95  207/115  7/6/23 181/103   7/7/23 192/104, recheck 0800 165/93, noon 169/92    Wife is very concerned regarding BP readings. Pt also upset/concerned. Reassurance to both wife, and encouraged reassurance to patient. Will send to MAT to inform, however pt just switched medications, and been on for 3 full days. May need more time to take full effect. Encouraged pt to also de-stress/promote relaxation with his sister in the hospital. Stress will increase blood pressure too. Again, will send to Mat to inform, and seek further advisement. Pt has OV with PCP on 7/12/23. CMM,Rn

## 2023-07-11 NOTE — TELEPHONE ENCOUNTER
===View-only below this line===  ----- Message -----  From: Pablito Solis DO  Sent: 7/10/2023   7:05 PM CDT  To: Jaime Isaacs RN    When did the patient start rivastigmine? It has an increased risk of cardiovascular  complications.    There is an interaction with this medication with all beta blockers.  I would stop coreg.  Start hydrochlorothiazide 25 mg daily. Continue lisinopril.      PC and LVm to return call. Exelon appears to have been started in March of 2023. FELIXRn

## 2023-07-12 RX ORDER — HYDROCHLOROTHIAZIDE 25 MG/1
25 TABLET ORAL DAILY
Qty: 90 TABLET | Refills: 1 | Status: SHIPPED | OUTPATIENT
Start: 2023-07-12 | End: 2024-01-15

## 2023-07-12 NOTE — TELEPHONE ENCOUNTER
"PC with wife. Discussion of recommendations. Started Alzheimer medication in the spring. Will stop Carvedilol as instructed. New Rx sent to pharmacy, with refill. Education to not take \"lol\"drugs while on the [employer]. Wife verbalized understanding. Will  the medication to start today. FELIX,RN   "

## 2023-07-24 ENCOUNTER — TRANSFERRED RECORDS (OUTPATIENT)
Dept: HEALTH INFORMATION MANAGEMENT | Facility: CLINIC | Age: 73
End: 2023-07-24

## 2024-01-12 ENCOUNTER — TELEPHONE (OUTPATIENT)
Dept: CARDIOLOGY | Facility: CLINIC | Age: 74
End: 2024-01-12
Payer: COMMERCIAL

## 2024-01-15 ENCOUNTER — TRANSFERRED RECORDS (OUTPATIENT)
Dept: HEALTH INFORMATION MANAGEMENT | Facility: CLINIC | Age: 74
End: 2024-01-15

## 2024-01-15 ENCOUNTER — LAB REQUISITION (OUTPATIENT)
Dept: LAB | Facility: CLINIC | Age: 74
End: 2024-01-15
Payer: COMMERCIAL

## 2024-01-15 DIAGNOSIS — I10 ESSENTIAL HYPERTENSION: ICD-10-CM

## 2024-01-15 DIAGNOSIS — Z12.5 ENCOUNTER FOR SCREENING FOR MALIGNANT NEOPLASM OF PROSTATE: ICD-10-CM

## 2024-01-15 DIAGNOSIS — I10 ESSENTIAL (PRIMARY) HYPERTENSION: ICD-10-CM

## 2024-01-15 LAB
ALBUMIN SERPL BCG-MCNC: 4.3 G/DL (ref 3.5–5.2)
ALP SERPL-CCNC: 101 U/L (ref 40–150)
ALT SERPL W P-5'-P-CCNC: 24 U/L (ref 0–70)
ANION GAP SERPL CALCULATED.3IONS-SCNC: 15 MMOL/L (ref 7–15)
AST SERPL W P-5'-P-CCNC: 24 U/L (ref 0–45)
BILIRUB SERPL-MCNC: 0.4 MG/DL
BUN SERPL-MCNC: 19.1 MG/DL (ref 8–23)
CALCIUM SERPL-MCNC: 9.9 MG/DL (ref 8.8–10.2)
CHLORIDE SERPL-SCNC: 104 MMOL/L (ref 98–107)
CHOLEST SERPL-MCNC: 130 MG/DL
CREAT SERPL-MCNC: 1.01 MG/DL (ref 0.67–1.17)
DEPRECATED HCO3 PLAS-SCNC: 25 MMOL/L (ref 22–29)
EGFRCR SERPLBLD CKD-EPI 2021: 77 ML/MIN/1.73M2
FASTING STATUS PATIENT QL REPORTED: NORMAL
GLUCOSE SERPL-MCNC: 98 MG/DL (ref 70–99)
HDLC SERPL-MCNC: 54 MG/DL
LDLC SERPL CALC-MCNC: 61 MG/DL
NONHDLC SERPL-MCNC: 76 MG/DL
POTASSIUM SERPL-SCNC: 4 MMOL/L (ref 3.4–5.3)
PROT SERPL-MCNC: 7.4 G/DL (ref 6.4–8.3)
PSA SERPL DL<=0.01 NG/ML-MCNC: 3.82 NG/ML (ref 0–6.5)
SODIUM SERPL-SCNC: 144 MMOL/L (ref 135–145)
TRIGL SERPL-MCNC: 75 MG/DL

## 2024-01-15 PROCEDURE — 80053 COMPREHEN METABOLIC PANEL: CPT | Mod: ORL | Performed by: FAMILY MEDICINE

## 2024-01-15 PROCEDURE — G0103 PSA SCREENING: HCPCS | Mod: ORL | Performed by: FAMILY MEDICINE

## 2024-01-15 PROCEDURE — 80061 LIPID PANEL: CPT | Mod: ORL | Performed by: FAMILY MEDICINE

## 2024-01-15 RX ORDER — HYDROCHLOROTHIAZIDE 25 MG/1
25 TABLET ORAL DAILY
Qty: 90 TABLET | Refills: 0 | Status: SHIPPED | OUTPATIENT
Start: 2024-01-15 | End: 2024-03-18

## 2024-01-16 NOTE — TELEPHONE ENCOUNTER
PC with wife Sarah, and review of at home readings. She doesn't check his BP regularly. She will resume checking it regularly. Reports 140's mostly for the top numbers, higher at times. DBP 75-80's. Encouraged to monitor and call to report. Has upcoming appt with  in March for cardiology annual visit. Has direct office number to call and report if SBP consistently >140 and/or DBP consistently >85. No further questions at this time or cocerns. FELIX,RN

## 2024-03-18 ENCOUNTER — OFFICE VISIT (OUTPATIENT)
Dept: CARDIOLOGY | Facility: CLINIC | Age: 74
End: 2024-03-18
Attending: INTERNAL MEDICINE
Payer: COMMERCIAL

## 2024-03-18 VITALS
DIASTOLIC BLOOD PRESSURE: 65 MMHG | WEIGHT: 175 LBS | HEART RATE: 73 BPM | SYSTOLIC BLOOD PRESSURE: 140 MMHG | BODY MASS INDEX: 26.61 KG/M2 | RESPIRATION RATE: 16 BRPM

## 2024-03-18 DIAGNOSIS — I10 ESSENTIAL HYPERTENSION: ICD-10-CM

## 2024-03-18 DIAGNOSIS — E78.2 MIXED HYPERLIPIDEMIA: ICD-10-CM

## 2024-03-18 DIAGNOSIS — I21.3 ST ELEVATION MYOCARDIAL INFARCTION (STEMI), UNSPECIFIED ARTERY (H): ICD-10-CM

## 2024-03-18 DIAGNOSIS — I25.10 CORONARY ARTERY DISEASE INVOLVING NATIVE CORONARY ARTERY OF NATIVE HEART WITHOUT ANGINA PECTORIS: Primary | ICD-10-CM

## 2024-03-18 PROCEDURE — 99214 OFFICE O/P EST MOD 30 MIN: CPT

## 2024-03-18 RX ORDER — LISINOPRIL 40 MG/1
40 TABLET ORAL DAILY
Qty: 90 TABLET | Refills: 3 | Status: SHIPPED | OUTPATIENT
Start: 2024-03-18

## 2024-03-18 RX ORDER — HYDROCHLOROTHIAZIDE 25 MG/1
25 TABLET ORAL DAILY
Qty: 90 TABLET | Refills: 3 | Status: SHIPPED | OUTPATIENT
Start: 2024-03-18

## 2024-03-18 RX ORDER — AMLODIPINE BESYLATE 2.5 MG/1
2.5 TABLET ORAL DAILY
Qty: 30 TABLET | Refills: 11 | Status: SHIPPED | OUTPATIENT
Start: 2024-03-18

## 2024-03-18 NOTE — PATIENT INSTRUCTIONS
It was a pleasure taking part in your care today:    - Start amlodipine 2.5 mg once daily. Monitor for swelling or lightheadedness/ other side effects  - Continue to monitor BP goal is near or less than 140/90. Check in through MyChart or call with blood pressure update in 1 month  - Follow up in 1 year or sooner as needed     Please call the [facility] clinic with any questions or concerns at [phone].     Janie Murray PA-C

## 2024-03-18 NOTE — LETTER
3/18/2024    Isela Jay MD  [address]  [place] [address]    RE: Martell Patel       Dear Colleague,     I had the pleasure of seeing Martell Patel in the [facility].    HEART CARE ENCOUNTER CONSULTATON NOTE      [facility]  [phone]      Assessment/Recommendations   Assessment:   CAD: Hx inferior STEMI of rPDA, PCI mid RCA, nonobstructive coronary atherosclerosis in left system, patent RCA stent 3/2022 angiogram- no angina on aspirin  HTN: Elevated 140/65 on hydrochlorothiazide 25 mg, lisinopril 40 mg.  More consistent elevation in 150s/upper 140s systolic, diastolic mostly controlled at home.  No evidence of renal dysfunction on CMP.  Metoprolol avoided due to interaction with rivastigmine  HLD: LDL 61 atorvastatin 80 mg  VALENTE: CPAP nightly  Alzheimer's: treated with Rivastigmine    Plan:   Start amlodipine 2.5 mg once daily.  Discussed monitoring for swelling, symptoms of hypotension  Continue to monitor blood pressure and update clinic in 1 months of control  Continue atorvastatin and aspirin daily      Follow up in 1 year or sooner as needed     History of Present Illness/Subjective    HPI: Martell Patel is a 74 year old male with PMHx of CAD, HTN, HLD, VALENTE presents for follow up.  He reports no new changes since last cardiology visit 1 year ago.  He is accompanied by his wife.  Recent diagnosis of early Alzheimer's, treated with rivastigmine.  His blood pressure has been elevated during monitoring at home for the last 2 months.  Wonders if better control is needed.  He has had a few falls caused by weakness/knee pain with known arthritis.  Denies presyncope/syncope, LOC factor and falls.  No anginal chest pain or increased shortness of breath.  He has had a more chronic cough he thinks is caused by postnasal drip.     He denies lightheadedness, orthopnea, PND, palpitations, chest pain, and lower extremity edema.        Coronary angiogram  3/2022  Inferior STEMI in the setting of RPDA occlusion. There was also a 95% lesion in the distal RCA. Previously placed stent in the mid RCA is patent with mild in-stent restenosis. The RCA has moderate diffuse disease.  Status post successful PCI of the distal RCA lesion using a 3.0 x 16 mm Synergy drug-eluting stent.  With wiring of the lesion, the clot in the RPDA was embolized further distally. Due to the lesion in the RPDA being very distal and this being a very small vessel at this point, it could not be stented and we elected to proceed with conservative management.  Nonobstructive coronary atherosclerosis in the left coronary system.  Ost Cx to Prox Cx lesion is 20% stenosed.  1st Mrg lesion is 30% stenosed.       Physical Examination  Review of Systems   Vitals: BP (!) 140/65 (BP Location: Right arm, Patient Position: Sitting, Cuff Size: Adult Regular)   Pulse 73   Resp 16   Wt 79.4 kg (175 lb)   BMI 26.61 kg/m    BMI= Body mass index is 26.61 kg/m .  Wt Readings from Last 3 Encounters:   03/18/24 79.4 kg (175 lb)   03/30/23 88 kg (194 lb)   12/14/22 81.6 kg (180 lb)           ENT/Mouth: membranes moist, no oral lesions or bleeding gums.      EYES:  no scleral icterus, normal conjunctivae       Chest/Lungs:   lungs are clear to auscultation, no rales or wheezing, equal chest wall expansion    Cardiovascular:   Regular. Normal first and second heart sounds with no murmurs, rubs, or gallops; the radial pulses are intact, absent edema bilaterally        Extremities: no cyanosis or clubbing   Skin: no xanthelasma, warm.    Neurologic:  no tremors     Psychiatric: alert and oriented x3, calm        Please refer above for cardiac ROS details.        Medical History  Surgical History Family History Social History   Past Medical History:   Diagnosis Date    Arthritis     Ataxia     Chest pain     CIDP (chronic inflammatory demyelinating polyneuropathy) (H)     Remnant is that his sense of balance is impaired,  "particularly at night.    Coronary artery disease     HLD (hyperlipidemia)     HTN (hypertension)     Hyperlipidemia     Hypertension     Hypertension     Sleep apnea      Past Surgical History:   Procedure Laterality Date    APPENDECTOMY      APPENDECTOMY      CARDIAC CATHETERIZATION  12/06/2016    CARDIAC CATHETERIZATION N/A 12/6/2016    Procedure: Coronary Angiogram;  Surgeon: Duane Gandhi MD;  Location: [facility] Cath Lab;  Service:     CORONARY STENT PLACEMENT  12/06/2016    [place] to RCA.    CV CORONARY ANGIOGRAM N/A 3/23/2022    Procedure: Coronary Angiogram;  Surgeon: Castillo Benoit MD;  Location: [facility] CARDIAC CATH LAB    CV PCI N/A 3/23/2022    Procedure: Percutaneous Coronary Intervention;  Surgeon: Castillo Benoit MD;  Location: [facility] CARDIAC CATH LAB    ENT SURGERY      tonsilectomy    ORTHOPEDIC SURGERY      REVISION AMPUTATION OF FINGER      Top of right pointer finger - cut on     THROAT SURGERY      \"To remove some polyps in my throat.  No, they weren't cancer.\"    TONSILLECTOMY      US BIOPSY FINE NEEDLE ASPIRATION LYMPH NODE (BREAST) LEFT Left 7/6/2021     Family History   Problem Relation Age of Onset    Coronary Artery Disease Brother     Coronary Artery Disease Brother         Social History     Socioeconomic History    Marital status:      Spouse name: Not on file    Number of children: Not on file    Years of education: Not on file    Highest education level: Not on file   Occupational History    Not on file   Tobacco Use    Smoking status: Every Day     Packs/day: 1.50     Years: 50.00     Additional pack years: 0.00     Total pack years: 75.00     Types: Cigarettes    Smokeless tobacco: Never   Substance and Sexual Activity    Alcohol use: No     Comment: Alcoholic Drinks/day: No alcohol since 1978    Drug use: No    Sexual activity: Not on file   Other Topics Concern    Not on file   Social History Narrative    Not on file     Social Determinants of " Health     Financial Resource Strain: Not on file   Food Insecurity: Not on file   Transportation Needs: Not on file   Physical Activity: Not on file   Stress: Not on file   Social Connections: Not on file   Interpersonal Safety: Not on file   Housing Stability: Not on file           Medications  Allergies   Current Outpatient Medications   Medication Sig Dispense Refill    acetaminophen (TYLENOL) 500 MG tablet Take 500-1,000 mg by mouth every 6 hours as needed for mild pain      aspirin (ASA) 81 MG EC tablet Take 1 tablet (81 mg) by mouth daily Start tomorrow. 90 tablet 3    atorvastatin (LIPITOR) 80 MG tablet Take 1 tablet (80 mg) by mouth At Bedtime 90 tablet 3    fluticasone (FLONASE) 50 MCG/ACT nasal spray USE 1-2 SPRAYS INTO EACH NOSTRIL ONCE DAILY      hydrochlorothiazide (HYDRODIURIL) 25 MG tablet TAKE 1 TABLET (25 MG) BY MOUTH DAILY 90 tablet 0    lisinopril (ZESTRIL) 40 MG tablet Take 1 tablet (40 mg) by mouth daily 90 tablet 1    nitroGLYcerin (NITROSTAT) 0.4 MG sublingual tablet Place 1 tablet (0.4 mg) under the tongue every 5 minutes as needed for chest pain For chest pain place 1 tablet under the tongue every 5 minutes for 3 doses. If symptoms persist 5 minutes after 1st dose call 911. 30 tablet 1    potassium chloride ER (KLOR-CON M) 20 MEQ CR tablet Take 1 tablet (20 mEq) by mouth 2 times daily 90 tablet 3    rivastigmine (EXELON) 3 MG capsule Take 3 mg by mouth 2 times daily      rivastigmine (EXELON) 4.5 MG capsule  (Patient not taking: Reported on 3/30/2023)         Allergies   Allergen Reactions    Wellbutrin [Bupropion] Other (See Comments)     Nightmare    Varenicline      Other reaction(s): night bhakta          Lab Results    Chemistry/lipid CBC Cardiac Enzymes/BNP/TSH/INR   Recent Labs   Lab Test 01/15/24  1218   CHOL 130   HDL 54   LDL 61   TRIG 75     Recent Labs   Lab Test 01/15/24  1218 01/12/23  1433 03/23/22  0255   LDL 61 74 72     Recent Labs   Lab Test 01/15/24  1218     "  POTASSIUM 4.0   CHLORIDE 104   CO2 25   GLC 98   BUN 19.1   CR 1.01   GFRESTIMATED 77   TIMOTHY 9.9     Recent Labs   Lab Test 01/15/24  1218 01/12/23  1433 12/14/22 1950   CR 1.01 0.90 0.93     Recent Labs   Lab Test 03/23/22  0255   A1C 5.6          Recent Labs   Lab Test 12/14/22 1950   WBC 10.7   HGB 17.3   HCT 50.0   MCV 88        Recent Labs   Lab Test 12/14/22 1950 03/24/22  0457 03/23/22  0651   HGB 17.3 14.7 14.7    No results for input(s): \"TROPONINI\" in the last 53602 hours.  No results for input(s): \"BNP\", \"NTBNPI\", \"NTBNP\" in the last 08740 hours.  Recent Labs   Lab Test 03/31/22  1142   TSH 3.25     Recent Labs   Lab Test 12/14/22 1950 03/23/22  0255   INR 1.01 1.02          This note has been dictated using voice recognition software. Any grammatical, typographical, or context distortions are unintentional and inherent to the software    Janie Murray PA-C        Thank you for allowing me to participate in the care of your patient.      Sincerely,     Janie Castañeda PA-C     [facility]  cc:   Pablito Solis, DO  [address] [place] [address]      "

## 2024-03-18 NOTE — PROGRESS NOTES
HEART CARE ENCOUNTER CONSULTATON NOTE      [facility]  [phone]      Assessment/Recommendations   Assessment:   CAD: Hx inferior STEMI of rPDA, PCI mid RCA, nonobstructive coronary atherosclerosis in left system, patent RCA stent 3/2022 angiogram- no angina on aspirin  HTN: Elevated 140/65 on hydrochlorothiazide 25 mg, lisinopril 40 mg.  More consistent elevation in 150s/upper 140s systolic, diastolic mostly controlled at home.  No evidence of renal dysfunction on CMP.  Metoprolol avoided due to interaction with rivastigmine  HLD: LDL 61 atorvastatin 80 mg  VALENTE: CPAP nightly  Alzheimer's: treated with Rivastigmine    Plan:   Start amlodipine 2.5 mg once daily.  Discussed monitoring for swelling, symptoms of hypotension  Continue to monitor blood pressure and update clinic in 1 months of control  Continue atorvastatin and aspirin daily      Follow up in 1 year or sooner as needed     History of Present Illness/Subjective    HPI: Martell Patel is a 74 year old male with PMHx of CAD, HTN, HLD, VALENTE presents for follow up.  He reports no new changes since last cardiology visit 1 year ago.  He is accompanied by his wife.  Recent diagnosis of early Alzheimer's, treated with rivastigmine.  His blood pressure has been elevated during monitoring at home for the last 2 months.  Wonders if better control is needed.  He has had a few falls caused by weakness/knee pain with known arthritis.  Denies presyncope/syncope, LOC factor and falls.  No anginal chest pain or increased shortness of breath.  He has had a more chronic cough he thinks is caused by postnasal drip.     He denies lightheadedness, orthopnea, PND, palpitations, chest pain, and lower extremity edema.        Coronary angiogram 3/2022  Inferior STEMI in the setting of RPDA occlusion. There was also a 95% lesion in the distal RCA. Previously placed stent in the mid RCA is patent with mild in-stent restenosis. The RCA has moderate diffuse  disease.  Status post successful PCI of the distal RCA lesion using a 3.0 x 16 mm Synergy drug-eluting stent.  With wiring of the lesion, the clot in the RPDA was embolized further distally. Due to the lesion in the RPDA being very distal and this being a very small vessel at this point, it could not be stented and we elected to proceed with conservative management.  Nonobstructive coronary atherosclerosis in the left coronary system.  Ost Cx to Prox Cx lesion is 20% stenosed.  1st Mrg lesion is 30% stenosed.       Physical Examination  Review of Systems   Vitals: BP (!) 140/65 (BP Location: Right arm, Patient Position: Sitting, Cuff Size: Adult Regular)   Pulse 73   Resp 16   Wt 79.4 kg (175 lb)   BMI 26.61 kg/m    BMI= Body mass index is 26.61 kg/m .  Wt Readings from Last 3 Encounters:   03/18/24 79.4 kg (175 lb)   03/30/23 88 kg (194 lb)   12/14/22 81.6 kg (180 lb)           ENT/Mouth: membranes moist, no oral lesions or bleeding gums.      EYES:  no scleral icterus, normal conjunctivae       Chest/Lungs:   lungs are clear to auscultation, no rales or wheezing, equal chest wall expansion    Cardiovascular:   Regular. Normal first and second heart sounds with no murmurs, rubs, or gallops; the radial pulses are intact, absent edema bilaterally        Extremities: no cyanosis or clubbing   Skin: no xanthelasma, warm.    Neurologic:  no tremors     Psychiatric: alert and oriented x3, calm        Please refer above for cardiac ROS details.        Medical History  Surgical History Family History Social History   Past Medical History:   Diagnosis Date    Arthritis     Ataxia     Chest pain     CIDP (chronic inflammatory demyelinating polyneuropathy) (H)     Remnant is that his sense of balance is impaired, particularly at night.    Coronary artery disease     HLD (hyperlipidemia)     HTN (hypertension)     Hyperlipidemia     Hypertension     Hypertension     Sleep apnea      Past Surgical History:   Procedure  "Laterality Date    APPENDECTOMY      APPENDECTOMY      CARDIAC CATHETERIZATION  12/06/2016    CARDIAC CATHETERIZATION N/A 12/6/2016    Procedure: Coronary Angiogram;  Surgeon: Duane Gandhi MD;  Location: [facility] Cath Lab;  Service:     CORONARY STENT PLACEMENT  12/06/2016    [place] to RCA.    CV CORONARY ANGIOGRAM N/A 3/23/2022    Procedure: Coronary Angiogram;  Surgeon: Castillo Benoit MD;  Location: [facility] CARDIAC CATH LAB    CV PCI N/A 3/23/2022    Procedure: Percutaneous Coronary Intervention;  Surgeon: Castillo Benoit MD;  Location: [facility] CARDIAC CATH LAB    ENT SURGERY      tonsilectomy    ORTHOPEDIC SURGERY      REVISION AMPUTATION OF FINGER      Top of right pointer finger - cut on     THROAT SURGERY      \"To remove some polyps in my throat.  No, they weren't cancer.\"    TONSILLECTOMY      US BIOPSY FINE NEEDLE ASPIRATION LYMPH NODE (BREAST) LEFT Left 7/6/2021     Family History   Problem Relation Age of Onset    Coronary Artery Disease Brother     Coronary Artery Disease Brother         Social History     Socioeconomic History    Marital status:      Spouse name: Not on file    Number of children: Not on file    Years of education: Not on file    Highest education level: Not on file   Occupational History    Not on file   Tobacco Use    Smoking status: Every Day     Packs/day: 1.50     Years: 50.00     Additional pack years: 0.00     Total pack years: 75.00     Types: Cigarettes    Smokeless tobacco: Never   Substance and Sexual Activity    Alcohol use: No     Comment: Alcoholic Drinks/day: No alcohol since 1978    Drug use: No    Sexual activity: Not on file   Other Topics Concern    Not on file   Social History Narrative    Not on file     Social Determinants of Health     Financial Resource Strain: Not on file   Food Insecurity: Not on file   Transportation Needs: Not on file   Physical Activity: Not on file   Stress: Not on file   Social Connections: Not on file "   Interpersonal Safety: Not on file   Housing Stability: Not on file           Medications  Allergies   Current Outpatient Medications   Medication Sig Dispense Refill    acetaminophen (TYLENOL) 500 MG tablet Take 500-1,000 mg by mouth every 6 hours as needed for mild pain      aspirin (ASA) 81 MG EC tablet Take 1 tablet (81 mg) by mouth daily Start tomorrow. 90 tablet 3    atorvastatin (LIPITOR) 80 MG tablet Take 1 tablet (80 mg) by mouth At Bedtime 90 tablet 3    fluticasone (FLONASE) 50 MCG/ACT nasal spray USE 1-2 SPRAYS INTO EACH NOSTRIL ONCE DAILY      hydrochlorothiazide (HYDRODIURIL) 25 MG tablet TAKE 1 TABLET (25 MG) BY MOUTH DAILY 90 tablet 0    lisinopril (ZESTRIL) 40 MG tablet Take 1 tablet (40 mg) by mouth daily 90 tablet 1    nitroGLYcerin (NITROSTAT) 0.4 MG sublingual tablet Place 1 tablet (0.4 mg) under the tongue every 5 minutes as needed for chest pain For chest pain place 1 tablet under the tongue every 5 minutes for 3 doses. If symptoms persist 5 minutes after 1st dose call 911. 30 tablet 1    potassium chloride ER (KLOR-CON M) 20 MEQ CR tablet Take 1 tablet (20 mEq) by mouth 2 times daily 90 tablet 3    rivastigmine (EXELON) 3 MG capsule Take 3 mg by mouth 2 times daily      rivastigmine (EXELON) 4.5 MG capsule  (Patient not taking: Reported on 3/30/2023)         Allergies   Allergen Reactions    Wellbutrin [Bupropion] Other (See Comments)     Nightmare    Varenicline      Other reaction(s): night bhakta          Lab Results    Chemistry/lipid CBC Cardiac Enzymes/BNP/TSH/INR   Recent Labs   Lab Test 01/15/24  1218   CHOL 130   HDL 54   LDL 61   TRIG 75     Recent Labs   Lab Test 01/15/24  1218 01/12/23  1433 03/23/22  0255   LDL 61 74 72     Recent Labs   Lab Test 01/15/24  1218      POTASSIUM 4.0   CHLORIDE 104   CO2 25   GLC 98   BUN 19.1   CR 1.01   GFRESTIMATED 77   TIMOTHY 9.9     Recent Labs   Lab Test 01/15/24  1218 01/12/23  1433 12/14/22  1950   CR 1.01 0.90 0.93     Recent Labs   Lab  "Test 03/23/22  0255   A1C 5.6          Recent Labs   Lab Test 12/14/22 1950   WBC 10.7   HGB 17.3   HCT 50.0   MCV 88        Recent Labs   Lab Test 12/14/22 1950 03/24/22  0457 03/23/22  0651   HGB 17.3 14.7 14.7    No results for input(s): \"TROPONINI\" in the last 74064 hours.  No results for input(s): \"BNP\", \"NTBNPI\", \"NTBNP\" in the last 31651 hours.  Recent Labs   Lab Test 03/31/22  1142   TSH 3.25     Recent Labs   Lab Test 12/14/22 1950 03/23/22  0255   INR 1.01 1.02          This note has been dictated using voice recognition software. Any grammatical, typographical, or context distortions are unintentional and inherent to the software    Janie Murray PA-C                                       "

## 2024-08-07 ENCOUNTER — TRANSCRIBE ORDERS (OUTPATIENT)
Dept: OTHER | Age: 74
End: 2024-08-07

## 2024-08-07 DIAGNOSIS — F03.90 DEMENTIA WITHOUT BEHAVIORAL DISTURBANCE (H): ICD-10-CM

## 2024-08-07 DIAGNOSIS — D11.9 WARTHIN TUMOR: ICD-10-CM

## 2024-08-07 DIAGNOSIS — G30.1 ALZHEIMER'S DISEASE WITH LATE ONSET (CODE) (H): Primary | ICD-10-CM

## 2024-11-01 NOTE — PROGRESS NOTES
In person evaluation      HPI  11/4/2024, in person consultation (transfer of care from [facility])      74-year-old being evaluated neurologically for:  Alzheimer's dementia (onset early 2018)  Strong family history Alzheimer's dementia  Positive MRI changes  Obstructive sleep apnea  Distant history alcohol use disorder        Patient with Alzheimer's disease  Onset prior to November 2018 (insidious onset apparent in early 2018)  Wife tells me there may have been signs even back in 2016 when he got lost trying to go someplace that he should know  Sometimes has some paranoia thinks son-in-law is stealing tools from his garage and perseverates on that becomes agitated.  Previous MRI scan April 8, 2022 showed severe mesial temporal lobe volume loss and moderate severe entorhinal cortex volume loss  August 2022 neuropsych's significant deficits diffuse progressed since 2018    Previously past driving eval and 2022 with restrictions, after neuropsych testing in August 2022 it was recommended that he either stop driving or be retested.  Patient in 2022 living with wife's main caregiver their plan was for 2 sons to move in to help.    Notes March 2024 patient with accusatory behaviors.  Sits on porch wringing his hands around his cane being very upset.  Agitated for the whole day  Cannot watch [employer] anymore as it triggers his time in [place].        MoCA  3/29/2022,      21 out of 30  11/4/2024,      17 out of 30    Eating is picky and likes to eat sweets not good nutrition his food  Hydration drinks too much coffee  Wandering at night a bit but does not get into mischief  Agitation more so when the son-in-law comes over  Hallucinations denies  Dressing okay  ADLs okay  Ambulation uses cane/walker depending on how fatigued he is  Caregiver support, discussed caregiver burnout, needs a lot of cueing family members to come in and help out at times    No actual heartburn  No true decrease in appetite but does not like to  eat nutritious food like sweets  Occasional diarrhea if his Exelon is too close together  No lightheadedness    Power of  has been set up  Living well has been set up  End-of-life issues discussed with patient and wife 11/4/2024    Discussed medications for agitation such as Seroquel  Talked about the black box warning including but not limited to death and heart disease  If we were to use it we would start with a very low dose  Only using half of a 50 mg of trazodone 2 times per month at this time  Would use this medicine more frequently if needed first and then if that is not helpful could consider other choices in the future              A.  Alzheimer's dementia       Previously seen by Dr. Amaya 11/1/2018 at [facility]       Insidious onset apparent by early 2018       MRI head 4/8/2022       1.  Severe mesial temporal lobe volume loss and moderate to severe entorhinal cortex volume loss, suggestive of Alzheimer dementia.        2.  Moderate presumed chronic small vessel ischemic changes, which could contribute to cognitive decline.        3.  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        4.  Moderate diffuse brain parenchymal volume loss.        5.  No acute intracranial process.          Neuropsych testing 8/18/2022       1.  Severely abnormal cognitive testing, profound amnestic memory deficit.       2.  When compared to October 2018 testing patient has widespread cognitive decline to at least moderate level          Family history:        Mother with Alzheimer's disease        Maternal grandmother Alzheimer's disease        Siblings (9 of 13 siblings) developed Alzheimer's disease.        Family history of alcoholism      Laboratory data reviewed                   3/2022  B12           386  TSH           3.25            Complicated by         Past history of alcoholism        Hearing loss        VALENTE         Previously on Aricept switched to Exelon 4.5 mg twice daily in the  past    MoCA  3/29/2022,      21 out of 30  2024,      17 out of 30    B.  Obstructive sleep apnea       Does wear CPAP mask    C.  Vascular risk factors        Hypertension/hyperlipidemia/CAD/MI/emphysema/history PE    D.  Hearing loss        Past medical history  Alzheimer's dementia  Hypercholesterolemia  Hypertension  CAD/MI   GERD  History of PE  Emphysema  Sleep apnea  Hearing loss  Alcohol use disorder  CIDP      Habits  History of smoking  Past history alcoholism dry x 40+ years  Alcohol use disorder  High school graduate/completed bachelor's degree [employer] in [place]  Worked in a [employer] ret[employer]ing  [employer] care home in   Served in the [employer] stationed in [place] x 4 years, per history exposed to agent orange.    Family history  Mother with Alzheimer's disease  Maternal grandmother Alzheimer's disease  Siblings (9 of 13 siblings) developed Alzheimer's disease.  (4 brothers  of Alzheimer's, 5 sisters  of Alzheimer's)  1 sister  of COVID  1 brother heart attack  Family history of alcoholism    Workup  MRI head 2022  1.  Severe mesial temporal lobe volume loss and moderate to severe entorhinal cortex volume loss, suggestive of Alzheimer dementia.   2.  Moderate presumed chronic small vessel ischemic changes, which could contribute to cognitive decline.   3.  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   4.  Moderate diffuse brain parenchymal volume loss.   5.  No acute intracranial process.   Neuropsych testing 2022  1.  Severely abnormal cognitive testing, profound amnestic memory deficit.  2.  When compared to 2018 testing patient has widespread cognitive decline to at least moderate level    Laboratory data reviewed                   3/2022  B12           386  TSH           3.25        Exam    Review of system  Pertinent positives and negatives  No actual heartburn  No true decrease in appetite but does not like to eat nutritious food like  sweets  Occasional diarrhea if his Exelon is too close together  No lightheadedness  Denies hallucinations  Some chronic back and leg pain joint pain from arthritis  Has more diffuse weakness and trouble walking  Can have falls due to imbalance  Once in a while has a little bit of tremor more so of his jaw    Has trouble with urinary urgency is important    Has the hearing loss and ringing in the ears  Visual acuity not so good  Memory problems with anxiety and depression and agitation  Has had heart disease  Does snore and has sleep apnea and uses CPAP machine    Otherwise review of system negative    General exam  Blood pressure 138/69, pulse 82  Alert attentive  HEENT no signs of trauma (some jaw tremor)  Lungs clear distant breath sounds  Heart rate regular  Abdomen soft  No edema in the feet    Neurologic exam  Alert attentive  Process speech normal  Simple naming normal  Repetition normal  Comprehension normal (follows simple two-part command)  No significant aphasia  No neglect    MoCA  3/29/2022,      21 out of 30  11/4/2024,      17 out of 30    Upper extremities  No drift  No tremor    Lower extremities  Distal proximal strength adequate    Gait  Took 3 tries to get up from the seated position with his arms crossed  Walks with a golf club type cane does okay here inside on the flat    Ambulates with cane does have walker also    Neuro degenerative changes  Memory loss  Behavioral difficulties  Poor insight  Some bradykinesia subtle  Jaw tremor              Assessment/plan    1.  Alzheimer's dementia      Per wife may have had symptoms as far back as 2016       Previously seen by Dr. Amaya 11/1/2018 at [facility] (insidious onset apparent by early 2018)       MRI head 4/8/2022       1.  Severe mesial temporal lobe volume loss and moderate to severe entorhinal cortex volume loss, suggestive of Alzheimer dementia.        2.  Moderate presumed chronic small vessel ischemic changes, which could contribute  to cognitive decline.        3.  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        4.  Moderate diffuse brain parenchymal volume loss.        5.  No acute intracranial process.          Neuropsych testing 8/18/2022       1.  Severely abnormal cognitive testing, profound amnestic memory deficit.       2.  When compared to October 2018 testing patient has widespread cognitive decline to at least moderate level          Family history:        Mother with Alzheimer's disease        Maternal grandmother Alzheimer's disease        Siblings (9 of 13 siblings) developed Alzheimer's disease.        Family history of alcoholism      Laboratory data reviewed                   3/2022  B12           386  TSH           3.25            Complicated by         Past history of alcoholism        Hearing loss        VALENTE         Previously on Aricept switched to Exelon 4.5 mg twice daily in the past    MoCA  3/29/2022,      21 out of 30  11/4/2024,      17 out of 30    B.  Obstructive sleep apnea       Does wear CPAP mask    C.  Vascular risk factors        Hypertension/hyperlipidemia/CAD/MI/emphysema/history PE    D.  Hearing loss    Diagnosis  Alzheimer's with behavioral difficulties  Obstructive sleep apnea  Hearing loss  Strong family history of Alzheimer's  Vascular disease      Exelon 4.5 mg p.o. twice daily  As some mild diarrhea will not increase the dose at this time  Trazodone 50 mg tablet half a tablet as needed at night to void agitation, uses about twice per month started in October 2024    Discussed black box warning of Seroquel which could be used if agitation was more severe  Hold off at this time  If we had to use it we would start with very low dose    Conference with patient and wife  Discussed end-of-life issues/power of  financial and medical and discussed living well which they have set up  Discussed progression of dementia as the symptoms will get more prominent and progress faster as he gets  further along in the disease  Probably had symptoms and signs of disease as far back as 2016 per patient's wife      Follow-up in 6 months    Total care time today 60 minutes  The longitudinal plan of care for the diagnosis(es)/condition(s) as documented were addressed during this visit. Due to the added complexity in care, I will continue to support Martell in the subsequent management and with ongoing continuity of care.      As per his visit today  Reviewed extensive EMR notes  Reviewed health partner notes 3/27/2024, 10/7/2024  Reviewed neuropsych testing  Reviewed laboratory data  Reviewed MRI scans

## 2024-11-04 ENCOUNTER — OFFICE VISIT (OUTPATIENT)
Dept: NEUROLOGY | Facility: CLINIC | Age: 74
End: 2024-11-04
Attending: FAMILY MEDICINE
Payer: COMMERCIAL

## 2024-11-04 VITALS
HEIGHT: 68 IN | HEART RATE: 82 BPM | DIASTOLIC BLOOD PRESSURE: 69 MMHG | SYSTOLIC BLOOD PRESSURE: 138 MMHG | WEIGHT: 175 LBS | BODY MASS INDEX: 26.52 KG/M2

## 2024-11-04 DIAGNOSIS — D11.9 WARTHIN TUMOR: ICD-10-CM

## 2024-11-04 DIAGNOSIS — F03.90 DEMENTIA WITHOUT BEHAVIORAL DISTURBANCE (H): ICD-10-CM

## 2024-11-04 DIAGNOSIS — G30.1 ALZHEIMER'S DISEASE WITH LATE ONSET (CODE) (H): Primary | ICD-10-CM

## 2024-11-04 PROCEDURE — 99205 OFFICE O/P NEW HI 60 MIN: CPT | Performed by: PSYCHIATRY & NEUROLOGY

## 2024-11-04 PROCEDURE — G2211 COMPLEX E/M VISIT ADD ON: HCPCS | Performed by: PSYCHIATRY & NEUROLOGY

## 2024-11-04 RX ORDER — RIVASTIGMINE TARTRATE 4.5 MG/1
4.5 CAPSULE ORAL 2 TIMES DAILY
Qty: 180 CAPSULE | Refills: 3 | Status: SHIPPED | OUTPATIENT
Start: 2024-11-04

## 2024-11-04 RX ORDER — TRAZODONE HYDROCHLORIDE 50 MG/1
25 TABLET, FILM COATED ORAL
COMMUNITY
Start: 2024-10-07 | End: 2025-10-07

## 2024-11-04 ASSESSMENT — MONTREAL COGNITIVE ASSESSMENT (MOCA)
8. SERIAL SUBTRACTION OF 7S: 1
7. [VIGILENCE] TAP WHEN HEARING DESIGNATED LETTER: 1
WHAT LEVEL OF EDUCATION WAS ATTAINED: 0
10. [FLUENCY] NAME WORDS STARTING WITH DESIGNATED LETTER: 1
13. ORIENTATION SUBSCORE: 3
6. READ LIST OF DIGITS [FORWARD/BACKWARD]: 2
VISUOSPATIAL/EXECUTIVE SUBSCORE: 4
4. NAME EACH OF THE THREE ANIMALS SHOWN: 1
12. MEMORY INDEX SCORE: 0
9. REPEAT EACH SENTENCE: 2
11. FOR EACH PAIR OF WORDS, WHAT CATEGORY DO THEY BELONG TO (OUT OF 2): 2
WHAT IS THE TOTAL SCORE (OUT OF 30): 17

## 2024-11-04 NOTE — NURSING NOTE
Chief Complaint   Patient presents with    Memory Loss     Pt was seeing a neurologist at [facility], insurance changed so they are looking to establish care with a new neurologist. Pt states memory issues have been going on for a long time.     Karissa Leary LPN on 11/4/2024 at 11:16 AM

## 2024-11-04 NOTE — NURSING NOTE
KACEY COGNITIVE ASSESSMENT (MOCA)  Version 7.1 Original Version  VISUOSPATIAL/EXECUTIVE               COPY CUBE      [  1  ]                                [  0  ] DRAW CLOCK (Ten past eleven)  (3 points)    [  1  ]                    [  1  ]               [   1 ]       Contour            Numbers     Hands POINTS                  4 / 5   NAMING    [0   ]                                                                        [   0 ]                                             [  1  ]  Limitali Rios                                Camel                    1 / 3   MEMORY Read list of words, subject must repeat them. Do 2 trials, even if 1st trial is successful. Do a recall after 5 minutes  FACE VELVET Sabianist MIRNA RED No Points    1st          2nd         ATTENTION Read list of digits (1 digit/sec) Subject has to repeat in the forward order       [  1  ]   2  1  8  5  4                                [ 1   ] 7 4 2                         2 /2   Read list of letters. The subject must tap with his hand at each letter A. No points if > 2 errors.  [  1  ] F B A C M N A A J K L B A F A K D E A A A J A M O F A A B             1 /1   Serial 7 subtraction starting at 100          [  1  ] 93         [ 0   ] 86          [   0 ] 79          [   0 ] 72         [  0  ] 65   4 or 5 correct subtractions: 3 points,  2 or 3 correct: 2 points,  1correct: 1 point,   0 correct: 0 points           1 /3   LANGUAGE Repeat: I only know that Emery is the one to help today. [   1  ]                                      The cat always hid under the couch when dogs were in the room. [ 1  ]               2/2   Fluency: Name maximum number of words in one minute that begin with the letter F                                                                                                                    [  1  ] _14__ (N > 11 words)               1/1   ABSTRACTION Similarity  between e.g. banana-orange=fruit                                                                   [  1  ] train-bicycle                      [ 1   ] watch-ruler             2 /2   DELAYED  RECALL Has to recall words  WITH NO CUE FACE  [ 0   ] VELVET  [   0 ] Tenriism  [  0  ]  MIRNA  [  0  ] RED  [  0  ] Points for UNCUED recall only            0/5           OPTIONAL Category cue           Multiple choice cue          ORIENTATION  [  0  ] Date     [   0 ] Month       [   1 ] Year      [ 0   ] Day      [ 1   ] Place        [   1 ] City         3 /6   TOTAL  Normal > 26/30 Add 1 point if < 12 years education      17 /30

## 2025-01-13 ENCOUNTER — LAB REQUISITION (OUTPATIENT)
Dept: LAB | Facility: CLINIC | Age: 75
End: 2025-01-13
Payer: COMMERCIAL

## 2025-01-13 DIAGNOSIS — Z12.5 ENCOUNTER FOR SCREENING FOR MALIGNANT NEOPLASM OF PROSTATE: ICD-10-CM

## 2025-01-13 DIAGNOSIS — I10 ESSENTIAL (PRIMARY) HYPERTENSION: ICD-10-CM

## 2025-01-13 DIAGNOSIS — E78.00 PURE HYPERCHOLESTEROLEMIA, UNSPECIFIED: ICD-10-CM

## 2025-01-13 LAB
ALBUMIN SERPL BCG-MCNC: 3.5 G/DL (ref 3.5–5.2)
ALP SERPL-CCNC: 100 U/L (ref 40–150)
ALT SERPL W P-5'-P-CCNC: 51 U/L (ref 0–70)
ANION GAP SERPL CALCULATED.3IONS-SCNC: 13 MMOL/L (ref 7–15)
AST SERPL W P-5'-P-CCNC: 36 U/L (ref 0–45)
BILIRUB SERPL-MCNC: 0.6 MG/DL
BUN SERPL-MCNC: 19.8 MG/DL (ref 8–23)
CALCIUM SERPL-MCNC: 10 MG/DL (ref 8.8–10.4)
CHLORIDE SERPL-SCNC: 100 MMOL/L (ref 98–107)
CHOLEST SERPL-MCNC: 111 MG/DL
CREAT SERPL-MCNC: 0.99 MG/DL (ref 0.67–1.17)
EGFRCR SERPLBLD CKD-EPI 2021: 78 ML/MIN/1.73M2
FASTING STATUS PATIENT QL REPORTED: NO
FASTING STATUS PATIENT QL REPORTED: NO
GLUCOSE SERPL-MCNC: 109 MG/DL (ref 70–99)
HCO3 SERPL-SCNC: 25 MMOL/L (ref 22–29)
HDLC SERPL-MCNC: 45 MG/DL
LDLC SERPL CALC-MCNC: 56 MG/DL
NONHDLC SERPL-MCNC: 66 MG/DL
POTASSIUM SERPL-SCNC: 5.2 MMOL/L (ref 3.4–5.3)
PROT SERPL-MCNC: 7.1 G/DL (ref 6.4–8.3)
PSA SERPL DL<=0.01 NG/ML-MCNC: 3.8 NG/ML (ref 0–6.5)
SODIUM SERPL-SCNC: 138 MMOL/L (ref 135–145)
TRIGL SERPL-MCNC: 50 MG/DL

## 2025-01-13 PROCEDURE — 84132 ASSAY OF SERUM POTASSIUM: CPT | Performed by: FAMILY MEDICINE

## 2025-01-13 PROCEDURE — 80061 LIPID PANEL: CPT | Performed by: FAMILY MEDICINE

## 2025-01-13 PROCEDURE — G0103 PSA SCREENING: HCPCS | Mod: ORL | Performed by: FAMILY MEDICINE

## 2025-07-08 NOTE — TELEPHONE ENCOUNTER
[facility]    Phone Message    May a detailed message be left on voicemail: yes     Reason for Call: Other: Sarah called, she has some concerns about elevated b/p . Please call Sarah to discuss.. It has not changed in a couple of months, but is running higher than normal.      Action Taken: Other: cardiology    Travel Screening: Not Applicable  Thank you!  [facility]                                                                      Order placed for thoracentesis at [place]; LVM at [facility] to schedule patient

## (undated) DEVICE — CATH GUIDING BLUE YELLOW PTFE JR4 6FRX100CM 67008200

## (undated) DEVICE — 30 ATM BASIX COMPAK INFLATION SYRINGE KIT W/ MBA HEMOSTASIS VALVE

## (undated) DEVICE — CATH BALLOON EMERGE 2.75X12MMH7493918912270

## (undated) DEVICE — INTRO SHEATH 6FRX25CM PINNACLE RSS606

## (undated) DEVICE — MANIFOLD KIT ANGIO AUTOMATED 014613

## (undated) DEVICE — CATH BALLOON EMERGE 2.0X12MM H7493918912200

## (undated) DEVICE — VALVE HEMOSTASIS .115" DUOSTAT ADAPTER 23245

## (undated) DEVICE — GUIDEWIRE ASAHI SION BLUE 190CM J TIP AHW14R104J

## (undated) DEVICE — PACK HEART LEFT CUSTOM

## (undated) DEVICE — KIT HAND CONTROL ACIST 014644 AR-P54

## (undated) DEVICE — CATH ANGIO SUPERTORQUE PLUS JL4 6FRX100CM 533620

## (undated) RX ORDER — NOREPINEPHRINE BITARTRATE 0.06 MG/ML
INJECTION, SOLUTION INTRAVENOUS
Status: DISPENSED
Start: 2022-03-23

## (undated) RX ORDER — ONDANSETRON 2 MG/ML
INJECTION INTRAMUSCULAR; INTRAVENOUS
Status: DISPENSED
Start: 2022-03-23

## (undated) RX ORDER — HEPARIN SODIUM 1000 [USP'U]/ML
INJECTION, SOLUTION INTRAVENOUS; SUBCUTANEOUS
Status: DISPENSED
Start: 2022-03-23

## (undated) RX ORDER — LIDOCAINE HYDROCHLORIDE 10 MG/ML
INJECTION, SOLUTION EPIDURAL; INFILTRATION; INTRACAUDAL; PERINEURAL
Status: DISPENSED
Start: 2022-03-23

## (undated) RX ORDER — FENTANYL CITRATE 50 UG/ML
INJECTION, SOLUTION INTRAMUSCULAR; INTRAVENOUS
Status: DISPENSED
Start: 2022-03-23